# Patient Record
Sex: FEMALE | Race: WHITE | NOT HISPANIC OR LATINO | Employment: OTHER | URBAN - METROPOLITAN AREA
[De-identification: names, ages, dates, MRNs, and addresses within clinical notes are randomized per-mention and may not be internally consistent; named-entity substitution may affect disease eponyms.]

---

## 2017-03-27 DIAGNOSIS — Z12.31 ENCOUNTER FOR SCREENING MAMMOGRAM FOR MALIGNANT NEOPLASM OF BREAST: ICD-10-CM

## 2017-04-14 ENCOUNTER — HOSPITAL ENCOUNTER (OUTPATIENT)
Dept: MAMMOGRAPHY | Facility: MEDICAL CENTER | Age: 77
Discharge: HOME | End: 2017-04-14
Payer: MEDICARE

## 2017-04-14 DIAGNOSIS — Z12.31 ENCOUNTER FOR SCREENING MAMMOGRAM FOR MALIGNANT NEOPLASM OF BREAST: ICD-10-CM

## 2017-04-14 PROCEDURE — G0202 SCR MAMMO BI INCL CAD: HCPCS

## 2017-05-01 DIAGNOSIS — M85.80 OTHER SPECIFIED DISORDERS OF BONE DENSITY AND STRUCTURE, UNSPECIFIED SITE: ICD-10-CM

## 2017-05-01 DIAGNOSIS — Z12.31 ENCOUNTER FOR SCREENING MAMMOGRAM FOR MALIGNANT NEOPLASM OF BREAST: ICD-10-CM

## 2017-05-02 ENCOUNTER — ALLSCRIPTS OFFICE VISIT (OUTPATIENT)
Dept: OTHER | Facility: OTHER | Age: 77
End: 2017-05-02

## 2017-05-24 ENCOUNTER — GENERIC CONVERSION - ENCOUNTER (OUTPATIENT)
Dept: OTHER | Facility: OTHER | Age: 77
End: 2017-05-24

## 2017-06-06 ENCOUNTER — GENERIC CONVERSION - ENCOUNTER (OUTPATIENT)
Dept: OTHER | Facility: OTHER | Age: 77
End: 2017-06-06

## 2017-06-07 ENCOUNTER — ALLSCRIPTS OFFICE VISIT (OUTPATIENT)
Dept: OTHER | Facility: OTHER | Age: 77
End: 2017-06-07

## 2017-06-19 ENCOUNTER — GENERIC CONVERSION - ENCOUNTER (OUTPATIENT)
Dept: OTHER | Facility: OTHER | Age: 77
End: 2017-06-19

## 2017-07-26 ENCOUNTER — GENERIC CONVERSION - ENCOUNTER (OUTPATIENT)
Dept: OTHER | Facility: OTHER | Age: 77
End: 2017-07-26

## 2017-08-16 ENCOUNTER — GENERIC CONVERSION - ENCOUNTER (OUTPATIENT)
Dept: OTHER | Facility: OTHER | Age: 77
End: 2017-08-16

## 2017-08-23 ENCOUNTER — GENERIC CONVERSION - ENCOUNTER (OUTPATIENT)
Dept: OTHER | Facility: OTHER | Age: 77
End: 2017-08-23

## 2017-09-20 ENCOUNTER — GENERIC CONVERSION - ENCOUNTER (OUTPATIENT)
Dept: OTHER | Facility: OTHER | Age: 77
End: 2017-09-20

## 2017-09-28 ENCOUNTER — GENERIC CONVERSION - ENCOUNTER (OUTPATIENT)
Dept: OTHER | Facility: OTHER | Age: 77
End: 2017-09-28

## 2017-10-02 ENCOUNTER — GENERIC CONVERSION - ENCOUNTER (OUTPATIENT)
Dept: OTHER | Facility: OTHER | Age: 77
End: 2017-10-02

## 2017-10-11 ENCOUNTER — GENERIC CONVERSION - ENCOUNTER (OUTPATIENT)
Dept: OTHER | Facility: OTHER | Age: 77
End: 2017-10-11

## 2017-11-21 ENCOUNTER — GENERIC CONVERSION - ENCOUNTER (OUTPATIENT)
Dept: OTHER | Facility: OTHER | Age: 77
End: 2017-11-21

## 2017-11-24 ENCOUNTER — ALLSCRIPTS OFFICE VISIT (OUTPATIENT)
Dept: OTHER | Facility: OTHER | Age: 77
End: 2017-11-24

## 2017-11-25 NOTE — PROGRESS NOTES
Assessment    1  Preop examination (V72 84) (Z01 818)  2  Chronic sinusitis, unspecified location (473 9) (J32 9)  3  Benign essential hypertension (401 1) (I10)  4  Hyperlipidemia (272 4) (E78 5)  5  Hyperthyroidism (242 90) (E05 90)    Plan  Benign essential hypertension    · Renew: Lisinopril 20 MG Oral Tablet; Take 1 tablet daily    Discussion/Summary  Surgical Clearance: She is at a LOW risk from a cardiovascular standpoint at this time without any additional cardiac testing  Reevaluation needed, if she should present with symptoms prior to surgery/procedure  Patient presented for preop clearance for sinus surgery  Her chronic medical problems are stable with the exception of her blood pressure which is elevated from its usual level  Patient relates that her home readings have been generally good but higher in doctor's offices  Will increase her lisinopril from 10-20 mg and I believe she should be fine for surgery  I reviewed her preoperative testing including blood work and EKG and all are acceptable  She is medically cleared to proceed  Would recommend blood pressure check prior to surgery  Chief Complaint  Pt presents for pre op clearance, pt will be having her sinuses cleaned out 11/30/17  History of Present Illness  Pre-Op Visit (Brief): The patient is being seen for a preoperative visit-- and-- sinus surgery  Surgical Risk Assessment:  Prior Anesthesia: She had prior anesthesia,-- a prior adverse reaction to general anesthesia-- and-- nausea  Pertinent Past Medical History: copd, lipids, HTN    HPI: Patient presents for preop clearance for sinus surgery  She has a past medical history significant for hypertension, hyperlipidemia, hyperthyroidism and remote history of ovarian cancer  She has no complaints presently though has had some elevated blood pressure readings in doctor's offices   Her home readings however have been normal  She takes Zetia and simvastatin for her lipids, lisinopril 10 mg for her blood pressure  She is also taking Celebrex or aspirin and allergy medication  Review of Systems   Constitutional: No fever, no chills, feels well, no tiredness, no recent weight gain or weight loss  Eyes: No complaints of eye pain, no red eyes, no eyesight problems, no discharge, no dry eyes, no itching of eyes  ENT: no complaints of earache, no loss of hearing, no nose bleeds, no nasal discharge, no sore throat, no hoarseness  Cardiovascular: No complaints of slow heart rate, no fast heart rate, no chest pain, no palpitations, no leg claudication, no lower extremity edema  Respiratory: No complaints of shortness of breath, no wheezing, no cough, no SOB on exertion, no orthopnea, no PND  Gastrointestinal: No complaints of abdominal pain, no constipation, no nausea or vomiting, no diarrhea, no bloody stools  Genitourinary: No complaints of dysuria, no incontinence, no pelvic pain, no dysmenorrhea, no vaginal discharge or bleeding  Musculoskeletal: No complaints of arthralgias, no myalgias, no joint swelling or stiffness, no limb pain or swelling  Integumentary: No complaints of skin rash or lesions, no itching, no skin wounds, no breast pain or lump  Neurological: No complaints of headache, no confusion, no convulsions, no numbness, no dizziness or fainting, no tingling, no limb weakness, no difficulty walking  Psychiatric: Not suicidal, no sleep disturbance, no anxiety or depression, no change in personality, no emotional problems  Endocrine: No complaints of proptosis, no hot flashes, no muscle weakness, no deepening of the voice, no feelings of weakness  Hematologic/Lymphatic: No complaints of swollen glands, no swollen glands in the neck, does not bleed easily, does not bruise easily  Active Problems  1  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)  2  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)  3  Anxiety (300 00) (F41 9)  4  Asthma (493 90) (J45 909)  5   Benign essential hypertension (401  1) (I10)  6  Dysphagia, unspecified type (787 20) (R13 10)  7  Encounter for gynecological examination without abnormal finding (V72 31) (Z01 419)  8  Flu vaccine need (V04 81) (Z23)  9  Gastroesophageal reflux disease with esophagitis (530 11) (K21 0)  10  History of allergy (V15 09) (Z88 9)  11  History of malignant neoplasm of ovary (V10 43) (Z85 43)  12  Hyperlipidemia (272 4) (E78 5)  13  Hyperthyroidism (242 90) (E05 90)  14  Lung nodule (793 11) (R91 1)  15  Osteoarthritis (715 90) (M19 90)  16  Osteopenia (733 90) (M85 80)  17  Pancreatic mass (577 9) (K86 9)  18  Simple goiter (240 0) (E04 0)    Past Medical History   · Acute upper respiratory infection (465 9) (J06 9)   · History of Benign essential hypertension (401 1) (I10)   · History of Depression screen (V79 0) (Z13 89)   · History of Flu vaccine need (V04 81) (Z23)   · History of acute sinusitis (V12 69) (Z87 09)   · History of allergic rhinitis (V12 69) (Z87 09)   · History of allergy (V15 09) (Z88 9)   · History of influenza (V12 09) (Z87 09)   · History of influenza vaccination (V49 89) (Z92 29)   · History of pneumococcal vaccination (V49 89) (Z92 29)   · History of screening mammography (V15 89) (Z92 89)   · History of screening mammography (V15 89) (Z92 89)   · History of tachycardia (V13 89) (W91 940)   · History of Influenza vaccine needed (V04 81) (Z23)   · History of Screening for depression (V79 0) (Z13 89)   · History of Screening for genitourinary condition (V81 6) (Z13 89)   · History of Screening for neurological condition (V80 09) (Z13 89)   · History of Screening for other and unspecified genitourinary condition (V81 6) (Z13 89)   · History of Shoulder Pain Elicited By Motion   · History of Special screening for other neurological conditions (V80 09) (Z13 89)    The active problems and past medical history were reviewed and updated today        Surgical History   · History of Hysterectomy    Family History  Mother    · Family history of malignant neoplasm of breast (V16 3) (Z80 3)  Father    · Family history of myocardial infarction (V17 3) (Z82 49)    Social History     · Never A Smoker    Current Meds  1  Azelastine HCl - 0 15 % Nasal Solution; Spray once in the morning and once at night; Therapy: 69CXD3786 to Recorded  2  CeleBREX 200 MG Oral Capsule; TAKE 1 CAPSULE ONCE DAILY; Therapy: 25VZH6860 to (Evaluate:70Yvp9101); Last Rx:07Jun2017 Ordered  3  Celecoxib 200 MG Oral Capsule; TAKE ONE CAPSULE BY MOUTH ONCE DAILY; Therapy: 19LOQ2414 to (Evaluate:22Apr2018)  Requested for: 02YDY5636; Last FF:99DWS3685 Ordered  4  [employer] Fish Oil CAPS; TAKE 1 CAPSULE Daily Recorded  5  [facility] Vit D 5000 High-Potency 5000 UNIT CAPS; Therapy: (Recorded:44Dfb0101) to Recorded  6  Dulera 100-5 MCG/ACT Inhalation Aerosol; INHALE 2 PUFFS,  BY MOUTH, TWICE DAILY; Therapy: (Lux Hicks) to Recorded  7  Ezetimibe 10 MG Oral Tablet; take one tablet by mouth daily; Therapy: 56VNV0572 to (Evaluate:01Drr0975)  Requested for: 72FBV1033; Last Rx:01Upi8435 Ordered  8  Glucosamine Chondr Complex 500-400 MG Oral Capsule; Therapy: (Recorded:04Jun2014) to Recorded  9  Ipratropium Bromide 0 02 % Inhalation Solution; USE 1 UNIT DOSE VIA NEBULIZER  4 TIMES A DAY AS NEEDED; Therapy: 94DVT7273 to Recorded  10  Lisinopril 10 MG Oral Tablet; take 1 tablet by mouth daily; Therapy: 58PTW6072 to (Evaluate:63Sfa4946)  Requested for: 34CKX0260; Last  Rx:07Jun2017 Ordered  11  Nasacort AQ 55 MCG/ACT AERO; bid; Therapy: 59XHN7358 to Recorded  12  Pantoprazole Sodium 40 MG Oral Tablet Delayed Release; TAKE 1 TABLET DAILY   Requested for: 34BDO0884; Last Rx:28Nix8425 Ordered  13  Simvastatin 20 MG Oral Tablet; Take 1 tablet daily; Therapy: 14LVK3435 to (Reta Maldonado)  Requested for: 77Hhi7624; Last  Rx:17Rym6975 Ordered    The medication list was reviewed and updated today  Allergies  1   No Known Drug Allergies    Vitals   Recorded: 23JFL4837 11:19AM Recorded: 40ZKV1596 10: 42AM   Temperature  98 F, Tympanic   Heart Rate  92   Respiration Quality  Normal   Respiration  17   Systolic 055 682, LUE, Sitting   Diastolic 92 001, LUE, Sitting   Weight  163 lb 4 oz   BMI Calculated  26 35   BSA Calculated  1 83   O2 Saturation  97, RA   Pain Scale  0       Physical Exam   Constitutional  General appearance: No acute distress, well appearing and well nourished  Head and Face  Head and face: Normal    Eyes  Conjunctiva and lids: No swelling, erythema or discharge  Pupils and irises: Equal, round, reactive to light  Ears, Nose, Mouth, and Throat  Hearing: Normal    Oropharynx: Normal with no erythema, edema, exudate or lesions  Neck  Neck: Supple, symmetric, trachea midline, no masses  Thyroid: Normal, no thyromegaly  Pulmonary  Respiratory effort: No increased work of breathing or signs of respiratory distress  Auscultation of lungs: Clear to auscultation  Cardiovascular  Auscultation of heart: Normal rate and rhythm, normal S1 and S2, no murmurs  Examination of extremities for edema and/or varicosities: Normal    Lymphatic  Palpation of lymph nodes in neck: No lymphadenopathy  Musculoskeletal  Gait and station: Normal    Neurologic  Cranial nerves: Cranial nerves II-XII intact  Psychiatric  Judgment and insight: Normal    Orientation to person, place, and time: Normal    Recent and remote memory: Intact  Mood and affect: Normal        Results/Data  No acute ischemia1   Rhythm and rate:1  normal sinus rhythm1   P-waves:1  the P wave is normal1   QRS:1  the QRS is normal1   ST segment:1  the ST segments are normal1   Comparison to prior ECGs:1   No prior ECGs were available for comparison1         1 Amended By: Chava Baker; Nov 24 2017 12:32 PM EST    End of Encounter Meds    1  Lisinopril 20 MG Oral Tablet; Take 1 tablet daily; Therapy: 24Apr2013 to (Last Rx:24Nov2017)  Requested for: 79NRM2316 Ordered    2   Pantoprazole Sodium 40 MG Oral Tablet Delayed Release; TAKE 1 TABLET DAILY  Requested for: 25HEZ8728; Last Rx:11Oct2017 Ordered    3  Ezetimibe 10 MG Oral Tablet (Zetia); take one tablet by mouth daily; Therapy: 80WNA4052 to (Evaluate:52Ums0482)  Requested for: 48DTN6554; Last Rx:05Nov2017 Ordered  4  Simvastatin 20 MG Oral Tablet; Take 1 tablet daily; Therapy: 09HCI1444 to (Para Allis)  Requested for: 27Nam0043; Last Rx:28Hvl8807 Ordered    5  CeleBREX 200 MG Oral Capsule (Celecoxib); TAKE 1 CAPSULE ONCE DAILY; Therapy: 03ZWM4860 to (Evaluate:78Szn5896); Last Rx:07Jun2017 Ordered  6  Celecoxib 200 MG Oral Capsule (CeleBREX); TAKE ONE CAPSULE BY MOUTH ONCE DAILY; Therapy: 38TEK6118 to (Evaluate:22Apr2018)  Requested for: 27DHG5451; Last AL:30GRJ7287 Ordered    7  Nasacort AQ 55 MCG/ACT AERO; bid; Therapy: 11LIT1860 to Recorded    8  Dulera 100-5 MCG/ACT Inhalation Aerosol; INHALE 2 PUFFS,  BY MOUTH, TWICE DAILY; Therapy: (Loki William) to Recorded    9  Azelastine HCl - 0 15 % Nasal Solution; Spray once in the morning and once at night; Therapy: 54JKY0026 to Recorded  10  [employer] Fish Oil CAPS; TAKE 1 CAPSULE Daily Recorded  11  [employer]S Vit D 5000 High-Potency 5000 UNIT CAPS; Therapy: (Recorded:00Wms9812) to Recorded  12  Glucosamine Chondr Complex 500-400 MG Oral Capsule; Therapy: (Recorded:04Jun2014) to Recorded  13  Ipratropium Bromide 0 02 % Inhalation Solution; USE 1 UNIT DOSE VIA NEBULIZER  4  TIMES A DAY AS NEEDED; Therapy: 72VXF7802 to Recorded    Future Appointments    Date/Time Provider Specialty Site   05/03/2018 01:15 PM MARIA LUISA Rowe   Obstetrics/Gynecology OB GYN C[facility]   12/13/2017 01:00 PM Timothy Spears DO Family Medicine [address]   Electronically signed by : Bebe Teixeira DO; Nov 24 2017 12:34PM EST                       (Author)

## 2017-12-12 ENCOUNTER — GENERIC CONVERSION - ENCOUNTER (OUTPATIENT)
Dept: FAMILY MEDICINE CLINIC | Facility: CLINIC | Age: 77
End: 2017-12-12

## 2017-12-13 ENCOUNTER — ALLSCRIPTS OFFICE VISIT (OUTPATIENT)
Dept: OTHER | Facility: OTHER | Age: 77
End: 2017-12-13

## 2017-12-20 ENCOUNTER — GENERIC CONVERSION - ENCOUNTER (OUTPATIENT)
Dept: FAMILY MEDICINE CLINIC | Facility: CLINIC | Age: 77
End: 2017-12-20

## 2017-12-22 ENCOUNTER — GENERIC CONVERSION - ENCOUNTER (OUTPATIENT)
Dept: FAMILY MEDICINE CLINIC | Facility: CLINIC | Age: 77
End: 2017-12-22

## 2018-01-10 NOTE — MISCELLANEOUS
Message  Spoke to Dr Eliza Pérez, allergist  He called regarding patient's emergency room visit and CAT scan from last month  CAT scan revealed enlarged thyroid as well as thickening of distal esophagus and questionable pancreatic mass  Thyroid issue was addressed by surgical oncology  Dr Eliza Pérez is referring the patient to GI for EGD to evaluate the distal esophagus and I will order an MRI of the abdomen to evaluate the pancreatic mass  Plan  Pancreatic mass    · * MRI ABDOMEN W WO CONTRAST; Status:Need Information - Financial Authorization;   Requested for:94Pcb4480;     Signatures   Electronically signed by : Maninder Vences DO; Jul 1 2016 11:19AM EST                       (Author)

## 2018-01-10 NOTE — CONSULTS
Chief Complaint  Pt presents for pre op clearance, pt will be having her sinuses cleaned out 11/30/17  History of Present Illness  Pre-Op Visit (Brief): The patient is being seen for a preoperative visit and sinus surgery  Surgical Risk Assessment:   Prior Anesthesia: She had prior anesthesia, a prior adverse reaction to general anesthesia and nausea  Pertinent Past Medical History: copd, lipids, HTN    HPI: Patient presents for preop clearance for sinus surgery  She has a past medical history significant for hypertension, hyperlipidemia, hyperthyroidism and remote history of ovarian cancer  She has no complaints presently though has had some elevated blood pressure readings in doctor's offices  Her home readings however have been normal  She takes Zetia and simvastatin for her lipids, lisinopril 10 mg for her blood pressure  She is also taking Celebrex or aspirin and allergy medication  Review of Systems    Constitutional: No fever, no chills, feels well, no tiredness, no recent weight gain or weight loss  Eyes: No complaints of eye pain, no red eyes, no eyesight problems, no discharge, no dry eyes, no itching of eyes  ENT: no complaints of earache, no loss of hearing, no nose bleeds, no nasal discharge, no sore throat, no hoarseness  Cardiovascular: No complaints of slow heart rate, no fast heart rate, no chest pain, no palpitations, no leg claudication, no lower extremity edema  Respiratory: No complaints of shortness of breath, no wheezing, no cough, no SOB on exertion, no orthopnea, no PND  Gastrointestinal: No complaints of abdominal pain, no constipation, no nausea or vomiting, no diarrhea, no bloody stools  Genitourinary: No complaints of dysuria, no incontinence, no pelvic pain, no dysmenorrhea, no vaginal discharge or bleeding  Musculoskeletal: No complaints of arthralgias, no myalgias, no joint swelling or stiffness, no limb pain or swelling     Integumentary: No complaints of skin rash or lesions, no itching, no skin wounds, no breast pain or lump  Neurological: No complaints of headache, no confusion, no convulsions, no numbness, no dizziness or fainting, no tingling, no limb weakness, no difficulty walking  Psychiatric: Not suicidal, no sleep disturbance, no anxiety or depression, no change in personality, no emotional problems  Endocrine: No complaints of proptosis, no hot flashes, no muscle weakness, no deepening of the voice, no feelings of weakness  Hematologic/Lymphatic: No complaints of swollen glands, no swollen glands in the neck, does not bleed easily, does not bruise easily  Active Problems    1  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)   2  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)   3  Anxiety (300 00) (F41 9)   4  Asthma (493 90) (J45 909)   5  Benign essential hypertension (401 1) (I10)   6  Dysphagia, unspecified type (787 20) (R13 10)   7  Encounter for gynecological examination without abnormal finding (V72 31) (Z01 419)   8  Flu vaccine need (V04 81) (Z23)   9  Gastroesophageal reflux disease with esophagitis (530 11) (K21 0)   10  History of allergy (V15 09) (Z88 9)   11  History of malignant neoplasm of ovary (V10 43) (Z85 43)   12  Hyperlipidemia (272 4) (E78 5)   13  Hyperthyroidism (242 90) (E05 90)   14  Lung nodule (793 11) (R91 1)   15  Osteoarthritis (715 90) (M19 90)   16  Osteopenia (733 90) (M85 80)   17  Pancreatic mass (577 9) (K86 9)   18   Simple goiter (240 0) (E04 0)    Past Medical History    · Acute upper respiratory infection (465 9) (J06 9)   · History of Benign essential hypertension (401 1) (I10)   · History of Depression screen (V79 0) (Z13 89)   · History of Flu vaccine need (V04 81) (Z23)   · History of acute sinusitis (V12 69) (Z87 09)   · History of allergic rhinitis (V12 69) (Z87 09)   · History of allergy (V15 09) (Z88 9)   · History of influenza (V12 09) (Z87 09)   · History of influenza vaccination (V49 89) (Z92 29)   · History of pneumococcal vaccination (V49 89) (Z92 29)   · History of screening mammography (V15 89) (Z92 89)   · History of screening mammography (V15 89) (Z92 89)   · History of tachycardia (V13 89) (G44 942)   · History of Influenza vaccine needed (V04 81) (Z23)   · History of Screening for depression (V79 0) (Z13 89)   · History of Screening for genitourinary condition (V81 6) (Z13 89)   · History of Screening for neurological condition (V80 09) (Z13 89)   · History of Screening for other and unspecified genitourinary condition (V81 6) (Z13 89)   · History of Shoulder Pain Elicited By Motion   · History of Special screening for other neurological conditions (V80 09) (Z13 89)    The active problems and past medical history were reviewed and updated today  Surgical History    · History of Hysterectomy    Family History    · Family history of malignant neoplasm of breast (V16 3) (Z80 3)    · Family history of myocardial infarction (V17 3) (Z82 49)    Social History    · Never A Smoker    Current Meds   1  Azelastine HCl - 0 15 % Nasal Solution; Spray once in the morning and once at night; Therapy: 76CNI4034 to Recorded   2  CeleBREX 200 MG Oral Capsule; TAKE 1 CAPSULE ONCE DAILY; Therapy: 03DBI3492 to (Evaluate:25Ybd5019); Last Rx:07Jun2017 Ordered   3  Celecoxib 200 MG Oral Capsule; TAKE ONE CAPSULE BY MOUTH ONCE DAILY; Therapy: 15GCL2874 to (Evaluate:22Apr2018)  Requested for: 07HNT8788; Last   YX:58XSN0766 Ordered   4  [employer]S Fish Oil CAPS; TAKE 1 CAPSULE Daily Recorded   5  [employer]S Vit D 5000 High-Potency 5000 UNIT CAPS; Therapy: (Recorded:67Rwn9162) to Recorded   6  Dulera 100-5 MCG/ACT Inhalation Aerosol; INHALE 2 PUFFS,  BY MOUTH, TWICE DAILY; Therapy: (Beverlie Olszewski) to Recorded   7  Ezetimibe 10 MG Oral Tablet; take one tablet by mouth daily; Therapy: 02UJS8891 to (Evaluate:76Hby2324)  Requested for: 79NYP4958; Last   Rx:05Nov2017 Ordered   8   Glucosamine Chondr Complex 500-400 MG Oral Capsule; Therapy: (Recorded:04Jun2014) to Recorded   9  Ipratropium Bromide 0 02 % Inhalation Solution; USE 1 UNIT DOSE VIA NEBULIZER  4   TIMES A DAY AS NEEDED; Therapy: 09WFM1233 to Recorded   10  Lisinopril 10 MG Oral Tablet; take 1 tablet by mouth daily; Therapy: 73GGV6956 to (Evaluate:36Ngv6672)  Requested for: 63TWE1553; Last    Rx:53Nla3362 Ordered   11  Nasacort AQ 55 MCG/ACT AERO; bid; Therapy: 80LYP0028 to Recorded   12  Pantoprazole Sodium 40 MG Oral Tablet Delayed Release; TAKE 1 TABLET DAILY     Requested for: 60JFX7023; Last Rx:73Kax6670 Ordered   13  Simvastatin 20 MG Oral Tablet; Take 1 tablet daily; Therapy: 72CHP8635 to (Valerie Yeh)  Requested for: 50Mak4938; Last    Rx:69Znl1638 Ordered    The medication list was reviewed and updated today  Allergies    1  No Known Drug Allergies    Vitals  Signs    Systolic: 362  Diastolic: 92   Temperature: 98 F, Tympanic  Heart Rate: 92  Respiration Quality: Normal  Respiration: 17  Systolic: 309, LUE, Sitting  Diastolic: 672, LUE, Sitting  Weight: 163 lb 4 oz  BMI Calculated: 26 35  BSA Calculated: 1 83  O2 Saturation: 97, RA  Pain Scale: 0    Physical Exam    Constitutional   General appearance: No acute distress, well appearing and well nourished  Head and Face   Head and face: Normal     Eyes   Conjunctiva and lids: No swelling, erythema or discharge  Pupils and irises: Equal, round, reactive to light  Ears, Nose, Mouth, and Throat   Hearing: Normal     Oropharynx: Normal with no erythema, edema, exudate or lesions  Neck   Neck: Supple, symmetric, trachea midline, no masses  Thyroid: Normal, no thyromegaly  Pulmonary   Respiratory effort: No increased work of breathing or signs of respiratory distress  Auscultation of lungs: Clear to auscultation  Cardiovascular   Auscultation of heart: Normal rate and rhythm, normal S1 and S2, no murmurs      Examination of extremities for edema and/or varicosities: Normal  Lymphatic   Palpation of lymph nodes in neck: No lymphadenopathy  Musculoskeletal   Gait and station: Normal     Neurologic   Cranial nerves: Cranial nerves II-XII intact  Psychiatric   Judgment and insight: Normal     Orientation to person, place, and time: Normal     Recent and remote memory: Intact  Mood and affect: Normal        Results/Data  No acute ischemia  Rhythm and rate: normal sinus rhythm  P-waves: the P wave is normal    QRS: the QRS is normal    ST segment: the ST segments are normal    Comparison to prior ECGs:  no prior ECGs were available for comparison  Assessment    1  Preop examination (V72 84) (Z01 818)   2  Chronic sinusitis, unspecified location (473 9) (J32 9)   3  Benign essential hypertension (401 1) (I10)   4  Hyperlipidemia (272 4) (E78 5)   5  Hyperthyroidism (242 90) (E05 90)    Plan  Benign essential hypertension    · Lisinopril 20 MG Oral Tablet; Take 1 tablet daily    Discussion/Summary  Surgical Clearance: She is at a LOW risk from a cardiovascular standpoint at this time without any additional cardiac testing  Reevaluation needed, if she should present with symptoms prior to surgery/procedure  Patient presented for preop clearance for sinus surgery  Her chronic medical problems are stable with the exception of her blood pressure which is elevated from its usual level  Patient relates that her home readings have been generally good but higher in doctor's offices  Will increase her lisinopril from 10-20 mg and I believe she should be fine for surgery  I reviewed her preoperative testing including blood work and EKG and all are acceptable  She is medically cleared to proceed  Would recommend blood pressure check prior to surgery  End of Encounter Meds    1  Lisinopril 20 MG Oral Tablet; Take 1 tablet daily; Therapy: 24Apr2013 to (Last Rx:24Nov2017)  Requested for: 35RXE5638 Ordered    2   Pantoprazole Sodium 40 MG Oral Tablet Delayed Release; TAKE 1 TABLET DAILY    Requested for: 36KOG1284; Last Rx:11Oct2017 Ordered    3  Ezetimibe 10 MG Oral Tablet (Zetia); take one tablet by mouth daily; Therapy: 98ERK6566 to (Evaluate:47Oqu4667)  Requested for: 18FQX7371; Last   Rx:05Nov2017 Ordered   4  Simvastatin 20 MG Oral Tablet; Take 1 tablet daily; Therapy: 03CWX5125 to ([facility])  Requested for: 59Xdr3125; Last   Rx:58Lpd1380 Ordered    5  CeleBREX 200 MG Oral Capsule (Celecoxib); TAKE 1 CAPSULE ONCE DAILY; Therapy: 39OOE9214 to (Evaluate:18Hqb4565); Last Rx:07Jun2017 Ordered   6  Celecoxib 200 MG Oral Capsule (CeleBREX); TAKE ONE CAPSULE BY MOUTH ONCE   DAILY; Therapy: 45LXN7275 to (Evaluate:22Apr2018)  Requested for: 77FNU6874; Last   NR:78GFK8364 Ordered    7  Nasacort AQ 55 MCG/ACT AERO; bid; Therapy: 63KXS4694 to Recorded    8  Dulera 100-5 MCG/ACT Inhalation Aerosol; INHALE 2 PUFFS,  BY MOUTH, TWICE DAILY; Therapy: (Juan J Salazar) to Recorded    9  Azelastine HCl - 0 15 % Nasal Solution; Spray once in the morning and once at night; Therapy: 10QWC8169 to Recorded   10  [employer] Fish Oil CAPS; TAKE 1 CAPSULE Daily Recorded   11  [employer]S Vit D 5000 High-Potency 5000 UNIT CAPS; Therapy: (Recorded:56Cxg2522) to Recorded   12  Glucosamine Chondr Complex 500-400 MG Oral Capsule; Therapy: (Recorded:04Jun2014) to Recorded   13  Ipratropium Bromide 0 02 % Inhalation Solution; USE 1 UNIT DOSE VIA NEBULIZER  4    TIMES A DAY AS NEEDED;     Therapy: 15FSA5925 to Recorded    Signatures   Electronically signed by : Leonardo Cook DO; Nov 24 2017 12:34PM EST                       (Author)

## 2018-01-13 VITALS
BODY MASS INDEX: 26.35 KG/M2 | HEART RATE: 92 BPM | WEIGHT: 163.25 LBS | SYSTOLIC BLOOD PRESSURE: 168 MMHG | OXYGEN SATURATION: 97 % | RESPIRATION RATE: 17 BRPM | DIASTOLIC BLOOD PRESSURE: 92 MMHG | TEMPERATURE: 98 F

## 2018-01-14 VITALS
BODY MASS INDEX: 26.52 KG/M2 | SYSTOLIC BLOOD PRESSURE: 122 MMHG | WEIGHT: 165 LBS | DIASTOLIC BLOOD PRESSURE: 62 MMHG | HEIGHT: 66 IN

## 2018-01-14 VITALS
DIASTOLIC BLOOD PRESSURE: 80 MMHG | WEIGHT: 166.31 LBS | TEMPERATURE: 97.6 F | SYSTOLIC BLOOD PRESSURE: 110 MMHG | HEART RATE: 84 BPM | BODY MASS INDEX: 26.73 KG/M2 | HEIGHT: 66 IN | OXYGEN SATURATION: 99 % | RESPIRATION RATE: 16 BRPM

## 2018-01-18 NOTE — MISCELLANEOUS
Message  Spoke to the patient on Sunday afternoon  Patient was seen in the emergency room over the weekend  Was seen primarily for an increase in shortness of breath and dysphagia  Records are not available to me at this time but as per the patient workup in the emergency room included CAT scan of the chest neck and head  Study revealed small pulmonary nodule and a large thyroid nodule  The thyroid nodule is a known entity and in fact had been biopsied 6 months ago and found to be negative  It is unclear whether this nodule is now larger than under previous studies  Patient was discharged with instructions to follow-up with endocrinology to arrange for follow-up on the nodule these of the biopsy  It is believed that this may be possible source of her symptoms  Additionally the patient had already seen gastroenterology last week for the complaint of dysphagia and is scheduled tentatively for the end of this week in 4 days for an EGD  I explained to the patient that based on her account of her ER visit, it is likely that the reason for her dysphasia and neck fullness is from an enlarging thyroid nodule and a primary concern at this time is for her to follow-up with endocrinology to arrange more definitive treatment which may in fact include surgery  The EGD may need to be postponed and if it turns out as I believe that the thyroid nodule is responsible for her symptoms it is more than likely that addressing that concern we will likely eliminate the need for the GI workup  Also the patient was given pantoprazole in the emergency room and has found in the last 24 hours that is significantly reduced the discomfort she had in her throat and chest  Will remain in close contact with the patient regarding the endocrinology evaluation  She has an established endocrinologist and will be calling their office first thing tomorrow morning to arrange follow-up        Signatures   Electronically signed by : Pooja Blake DO; May  8 2016 12:35PM EST                       (Author)

## 2018-01-22 VITALS — TEMPERATURE: 95.7 F

## 2018-01-23 VITALS
TEMPERATURE: 97.8 F | HEIGHT: 65 IN | BODY MASS INDEX: 26.35 KG/M2 | DIASTOLIC BLOOD PRESSURE: 74 MMHG | RESPIRATION RATE: 16 BRPM | SYSTOLIC BLOOD PRESSURE: 110 MMHG | HEART RATE: 67 BPM | WEIGHT: 158.19 LBS | OXYGEN SATURATION: 99 %

## 2018-01-23 NOTE — PROGRESS NOTES
Assessment    1  Encounter for preventive health examination (V70 0) (Z00 00)    Plan  Benign essential hypertension    · Lisinopril 10 MG Oral Tablet; Take 1 tablet daily  Benign essential hypertension, History of malignant neoplasm of ovary, Hyperlipidemia,  Hyperthyroidism    · (Q) CBC (INCLUDES DIFF/PLT) (REFL); Status:Active; Requested VHB:79PCC7140;    · (Q) COMPREHENSIVE METABOLIC PANEL W/O ALT; Status:Active; Requested  YBU:75AHK0475;    · (Q) LIPID PANEL WITH DIRECT LDL; Status:Active; Requested VND:84VZP0147;    · (Q) TSH, 3RD GENERATION; Status:Active; Requested PTI:72GXZ9552;   Hyperlipidemia    · Ezetimibe 10 MG Oral Tablet (Zetia); take one tablet by mouth daily  Screening for depression    · *VB-Depression Screening; Status:Complete - Retrospective By Protocol Authorization;    Done: 02ANQ9944 01:16PM  Screening for other and unspecified genitourinary condition    · *VB - Urinary Incontinence Screen (Dx Z13 89 Screen for UI); Status:Complete -  Retrospective By Protocol Authorization;   Done: 48USJ2161 01:16PM  Special screening for other neurological conditions    · *VB - Fall Risk Assessment  (Dx Z13 89 Screen for Neurologic Disorder);  Status:Complete - Retrospective By Protocol Authorization;   Done: 03YYY7685 01:15PM    Discussion/Summary    Patient was seen for annual wellness visit for Medicare  I reviewed her questionnaire with her and her no outstanding issues or new concerns from last years form  She is up-to-date with mammogram    She's up-to-date with immunizations but due for DEXA scan  Impression: Subsequent Annual Wellness Visit  Cardiovascular screening and counseling: screening is current  Diabetes screening and counseling: screening is current  Colorectal cancer screening and counseling: screening is current  Breast cancer screening and counseling: screening is current  Cervical cancer screening and counseling: screening not indicated     Osteoporosis screening and counseling: due for DXA axial skeleton  Abdominal aortic aneurysm screening and counseling: screening not indicated  Glaucoma screening and counseling: screening is current  HIV screening and counseling: screening not indicated  Immunizations: influenza vaccine is up to date this year, the lifetime pneumococcal vaccine has been completed, hepatitis B vaccination series is not indicated at this time due to the patient's low risk of tita the disease, Zostavax vaccination up to date, Td vaccination status is unknown and Tdap vaccination status is unknown  Advance Directive Planning: complete and up to date, she was encouraged to follow-up with me to discuss her questions and/or decisions  Patient Discussion: plan discussed with the patient, follow-up visit needed in 6 months  Chief Complaint  Pt presents for AWV  History of Present Illness  HPI: Patient presents for annual wellness visit for Medicare  She completed her questionnaire  Overall she is doing well though she has had an eventful several weeks recently  She had sinus surgery which was fairly uneventful but shortly after that had an acute episode of shortness of breath which turned out to be a non ST elevation MI  She underwent cardiac stenting as result of that  At this point she is doing well and has no new complaints  Medication was adjusted  She answered the right rest of her questions on the annual wellness visit and there are no significant changes as from last year  Welcome to [employer] and Wellness Visits: The patient is being seen for the subsequent annual wellness visit  Medicare Screening and Risk Factors   Hospitalizations: no previous hospitalizations, she has been hospitalized 1 times and SINUS AND STENT  Once per lifetime medicare screening tests: ECG has not been done and AAA screening US has not yet been done  Medicare Screening Tests Risk Questions   Abdominal aortic aneurysm risk assessment: none indicated  Osteoporosis risk assessment: , female gender and over 48years of age, but none indicated  HIV risk assessment: none indicated  Drug and Alcohol Use: The patient has never smoked cigarettes  She has declined tobacco cessation intervention  The patient reports occasional alcohol use  Alcohol concern:   The patient has no concerns about alcohol abuse  She has declined alcohol treatment  She has never used illicit drugs  She has declined drug treatment  Diet and Physical Activity: Current diet includes well balanced meals, low fat food choices, 1 servings of fruit per day, 1 servings of vegetables per day, 1 servings of meat per day, 1 servings of whole grains per day, 2 servings of dairy products per day, 2 cups of tea per day, 0 cans of regular soda per day, 0 cans of diet soda per day and 3-WATER  She exercises infrequently and SUMMER TIME  The patient does not exercise  Exercise: walking, golf  Mood Disorder and Cognitive Impairment Screening: PHQ-9 Depression Scale   Over the past 2 weeks, how often have you been bothered by the following problems? 1 ) Little interest or pleasure in doing things? Not at all    2 ) Feeling down, depressed or hopeless? Not at all    3 ) Trouble falling asleep or sleeping too much? Not at all    4 ) Feeling tired or having little energy? Not at all    5 ) Poor appetite or overeating? Not at all    6 ) Feeling bad about yourself, or that you are a failure, or have let yourself or your family down? Not at all    7 ) Trouble concentrating on things, such as reading a newspaper or watching television? Not at all    8 ) Moving or speaking so slowly that other people could have noticed, or the opposite, moving or speaking faster than usual? Not at all    9 ) Thoughts that you would be off dead or of hurting yourself in some way? Not at all  TOTAL SCORE: 0    Depression screening  negative for symptoms   She denies feeling down, depressed, or hopeless over the past two weeks  She denies feeling little interest or pleasure in doing things over the past two weeks  Cognitive impairment screening: denies difficulty learning/retaining new information, denies difficulty handling complex tasks, denies difficulty with reasoning, denies difficulty with spatial ability and orientation, denies difficulty with language and denies difficulty with behavior  Functional Ability/Level of Safety: Hearing is normal bilaterally, normal in the right ear and slightly decreased in the left ear  She denies hearing difficulties  She does not use a hearing aid  The patient is currently able to do activities of daily living without limitations, able to do instrumental activities of daily living without limitations, able to participate in social activities without limitations and able to drive without limitations  Activities of daily living details: does not need help using the phone, no transportation help needed, does not need help shopping, no meal preparation help needed, does not need help doing housework, does not need help doing laundry, does not need help managing medications and does not need help managing money  Fall risk factors: The patient fell 0 times in the past 12 months  Injury History: no polypharmacy, no alcohol use, no mobility impairment, no antidepressant use, no deconditioning, no postural hypotension, no sedative use, no visual impairment, no antihypertensive use, no cognitive impairment, up and go test was normal and no previous fall  Home safety risk factors:  no grab bars in the bathroom and no handrails on the stairs, but no loose rugs, no poor household lighting and no uneven floors  Advance Directives: Advance directives: living will and durable power of  for health care directives     Co-Managers and Medical Equipment/Suppliers: See Patient Care Team   Reviewed Updated Jay Denise:   Last Medicare Wellness Visit Information was reviewed, patient interviewed, no change since last AWV  Preventive Quality Program 65 and Older: Falls Risk: The patient fell 0 times in the past 12 months  The patient is currently asymptomatic Symptoms Include: The patient currently has no urinary incontinence symptoms  Patient Care Team    Care Team Member Role Specialty Office Number   Cliff Samano DO  Allergy/Immunology [phone]   Analy Hernandez MD  Obstetrics/Gynecology [phone]     Review of Systems    Constitutional: negative  Eyes: negative  ENT: negative  Cardiovascular: negative  Respiratory: shortness of breath, but negative  Gastrointestinal: Dysphasia, but negative  Genitourinary: negative  Musculoskeletal: negative  Integumentary and Breasts: negative  Neurological: negative  Psychiatric: negative  Endocrine: negative  Hematologic and Lymphatic: negative  Over the past 2 weeks, how often have you been bothered by the following problems? 1 ) Little interest or pleasure in doing things? Not at all    2 ) Feeling down, depressed or hopeless? Not at all    3 ) Trouble falling asleep or sleeping too much? Not at all    4 ) Feeling tired or having little energy? Not at all    5 ) Poor appetite or overeating? Not at all    6 ) Feeling bad about yourself, or that you are a failure, or have let yourself or your family down? Not at all    7 ) Trouble concentrating on things, such as reading a newspaper or watching television? Not at all    8 ) Moving or speaking so slowly that other people could have noticed, or the opposite, moving or speaking faster than usual? Not at all  How difficult have these problems made it for you to do your work, take care of things at home, or get along with people? 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)   3  Anxiety (300 00) (F41 9)   4  Asthma (493 90) (J45 909)   5  Benign essential hypertension (401 1) (I10)   6  Chronic sinusitis, unspecified location (473 9) (J32 9)   7  Dysphagia, unspecified type (787 20) (R13 10)   8  Encounter for gynecological examination without abnormal finding (V72 31) (Z01 419)   9  Gastroesophageal reflux disease with esophagitis (530 11) (K21 0)   10  History of allergy (V15 09) (Z88 9)   11  History of malignant neoplasm of ovary (V10 43) (Z85 43)   12  Hyperlipidemia (272 4) (E78 5)   13  Hyperthyroidism (242 90) (E05 90)   14  Lung nodule (793 11) (R91 1)   15  Osteoarthritis (715 90) (M19 90)   16  Osteopenia (733 90) (M85 80)   17  Pancreatic mass (577 9) (K86 9)   18  Preop examination (V72 84) (Z01 818)   19  Screening for depression (V79 0) (Z13 89)   20  Screening for other and unspecified genitourinary condition (V81 6) (Z13 89)   21  Simple goiter (240 0) (E04 0)   22   Special screening for other neurological conditions (V80 09) (Z13 89)    Past Medical History    · Acute upper respiratory infection (465 9) (J06 9)   · History of Benign essential hypertension (401 1) (I10)   · History of Depression screen (V79 0) (Z13 89)   · History of Flu vaccine need (V04 81) (Z23)   · History of acute sinusitis (V12 69) (Z87 09)   · History of allergic rhinitis (V12 69) (Z87 09)   · History of allergy (V15 09) (Z88 9)   · History of influenza (V12 09) (Z87 09)   · History of influenza vaccination (V49 89) (Z92 29)   · History of pneumococcal vaccination (V49 89) (Z92 29)   · History of screening mammography (V15 89) (Z92 89)   · History of screening mammography (V15 89) (Z92 89)   · History of tachycardia (V13 89) (S63 129)   · History of Influenza vaccine needed (V04 81) (Z23)   · History of Screening for depression (V79 0) (Z13 89)   · History of Screening for genitourinary condition (V81 6) (Z13 89)   · History of Screening for neurological condition (V80 09) (Z13 89)   · History of Screening for other and unspecified genitourinary condition (V81 6) (Z13 89)   · History of Shoulder Pain Elicited By Motion   · History of Special screening for other neurological conditions (V80 09) (Z13 89)    The active problems and past medical history were reviewed and updated today  Surgical History    · History of Hysterectomy    Family History  Mother    · Family history of malignant neoplasm of breast (V16 3) (Z80 3)  Father    · Family history of myocardial infarction (V17 3) (Z82 49)    Social History    · Never A Smoker  The social history was reviewed and updated today  Current Meds   1  Adult Aspirin EC Low Strength 81 MG Oral Tablet Delayed Release; TAKE 1 TABLET   DAILY AS DIRECTED; Therapy: 07LEQ9147 to Recorded   2  ALPRAZolam 0 25 MG Oral Tablet; TAKE 1 TABLET Twice daily PRN; Therapy: 54MQW9416 to (Evaluate:23Yzh2745) Recorded   3  Atorvastatin Calcium 40 MG Oral Tablet; TAKE 1 TABLET BY MOUTH EVERY DAY; Therapy: 42KLW9575 to Recorded   4  Clopidogrel Bisulfate 75 MG Oral Tablet; TAKE ONE TABLET BY MOUTH ONCE DAILY; Therapy: 80MEF3427 to (Evaluate:13Mar2018) Recorded   5  [facility] Vit D 5000 High-Potency 5000 UNIT CAPS; Therapy: (Recorded:84Kav0137) to Recorded   6  Dulera 100-5 MCG/ACT Inhalation Aerosol; INHALE 2 PUFFS,  BY MOUTH, TWICE   DAILY; Therapy: (Bhavin Gomez) to Recorded   7  Ezetimibe 10 MG Oral Tablet; take one tablet by mouth daily; Therapy: 06VDA8709 to (Evaluate:16Shf5659)  Requested for: 53HXJ6707; Last   Rx:05Nov2017 Ordered   8  Glucosamine Chondr Complex 500-400 MG Oral Capsule; Therapy: (Recorded:04Jun2014) to Recorded   9  Ipratropium Bromide 0 02 % Inhalation Solution; USE 1 UNIT DOSE VIA NEBULIZER  4   TIMES A DAY AS NEEDED; Therapy: 65THS6759 to Recorded   10  Lisinopril 10 MG Oral Tablet; Take 1 tablet daily; Therapy: 17EGS1102 to  Requested for: 70REM9525 Recorded   11  Metoprolol Tartrate 25 MG Oral Tablet; TAKE 1 TABLET DAILY; Therapy: 01DEX7653 to Recorded   12  Nasacort AQ 55 MCG/ACT AERO; bid; Therapy: 79SNY0064 to Recorded   13  Pantoprazole Sodium 40 MG Oral Tablet Delayed Release; TAKE 1 TABLET DAILY     Requested for: 69JGI8974; Last Rx:11Oct2017 Ordered   14  ProAir  (90 Base) MCG/ACT Inhalation Aerosol Solution; Therapy: 42FAW6092 to Recorded    The medication list was reviewed and updated today  Allergies    1  No Known Drug Allergies    Immunizations   ** Printed in Appendix #1 below  Vitals  Signs    Temperature: 97 8 F, Tympanic  Heart Rate: 67  Pulse Quality: Normal  Respiration Quality: Normal  Respiration: 16  Systolic: 416, LUE, Sitting  Diastolic: 74, LUE, Sitting  BP Cuff Size: Large  Height: 5 ft 4 57 in  Weight: 158 lb 3 oz  BMI Calculated: 26 68  BSA Calculated: 1 78  O2 Saturation: 99, RA  LMP: premenopause  Pain Scale: 0    Physical Exam    Constitutional   General appearance: No acute distress, well appearing and well nourished  Eyes   Conjunctiva and lids: No swelling, erythema or discharge  Pupils and irises: Equal, round, reactive to light  Pulmonary   Respiratory effort: No increased work of breathing or signs of respiratory distress  Auscultation of lungs: Clear to auscultation  Cardiovascular   Auscultation of heart: Normal rate and rhythm, normal S1 and S2, no murmurs  Examination of extremities for edema and/or varicosities: Normal     Abdomen   Abdomen: Non-tender, no masses  Lymphatic   Palpation of lymph nodes in neck: No lymphadenopathy  Musculoskeletal   Gait and station: Normal     Range of motion: Normal     Muscle strength/tone: Normal     Neurologic   Cranial nerves: Cranial nerves II-XII intact  Psychiatric   Judgment and insight: Normal     Orientation to person, place, and time: Normal     Recent and remote memory: Intact      Mood and affect: Normal        Results/Data  *VB - Urinary Incontinence Screen (Dx Z13 89 Screen for UI) 21ZDK6003 01:16PM Johnathan Warner     Test Name Result Flag Reference   Urinary Incontinence Assessment 55VWN5382 *VB-Depression Screening 32GCN2167 01:16PM Blandford Felix     Test Name Result Flag Reference   Depression Scale Result      Depression Screen - Negative For Symptoms     *VB - Fall Risk Assessment  (Dx Z13 89 Screen for Neurologic Disorder) 31SWO0540 01:15PM Blandford Felix     Test Name Result Flag Reference   Falls Risk      No falls in the past year       Future Appointments    Date/Time Provider Specialty Site   2018 01:15 PM MARIA LUISA James   Obstetrics/Gynecology [facility]     Signatures   Electronically signed by : Leslye Davalos DO; Dec 13 2017  1:40PM EST                       (Author)    Appendix #1     Patient: Rylan Lynne ; : 1940; MRN: [id number]      1 2 3 4 5 6    Influenza  26-Oct-2012  (72y) 16-Oct-2013  (73y) 19-Oct-2014  (74y) 28-Oct-2015  (75y) 31-Oct-2016  (76y) 11-Oct-2017  (77y)    PCV  21-May-2015  (75y)         [facility]  [phone]  (65y)         [place]

## 2018-01-31 ENCOUNTER — TELEPHONE (OUTPATIENT)
Dept: FAMILY MEDICINE CLINIC | Facility: CLINIC | Age: 78
End: 2018-01-31

## 2018-01-31 NOTE — TELEPHONE ENCOUNTER
Pt called into the office and stated that she needs a refill on her Pantoprazole 40 mg  [facility] she [facility]       [phone]

## 2018-02-02 DIAGNOSIS — K21.9 GASTROESOPHAGEAL REFLUX DISEASE WITHOUT ESOPHAGITIS: Primary | ICD-10-CM

## 2018-02-02 RX ORDER — PANTOPRAZOLE SODIUM 40 MG/1
40 TABLET, DELAYED RELEASE ORAL DAILY
Qty: 30 TABLET | Refills: 0 | OUTPATIENT
Start: 2018-02-02 | End: 2018-02-03 | Stop reason: SDUPTHER

## 2018-02-02 RX ORDER — PANTOPRAZOLE SODIUM 40 MG/1
1 TABLET, DELAYED RELEASE ORAL DAILY
COMMUNITY
End: 2018-02-02 | Stop reason: SDUPTHER

## 2018-02-03 DIAGNOSIS — K21.9 GASTROESOPHAGEAL REFLUX DISEASE WITHOUT ESOPHAGITIS: ICD-10-CM

## 2018-02-03 RX ORDER — PANTOPRAZOLE SODIUM 40 MG/1
TABLET, DELAYED RELEASE ORAL
Qty: 90 TABLET | Refills: 0 | Status: SHIPPED | OUTPATIENT
Start: 2018-02-03 | End: 2018-02-06 | Stop reason: SDUPTHER

## 2018-02-04 DIAGNOSIS — K21.9 GASTROESOPHAGEAL REFLUX DISEASE WITHOUT ESOPHAGITIS: ICD-10-CM

## 2018-02-04 RX ORDER — PANTOPRAZOLE SODIUM 40 MG/1
40 TABLET, DELAYED RELEASE ORAL DAILY
Qty: 90 TABLET | Refills: 0 | Status: CANCELLED | OUTPATIENT
Start: 2018-02-04

## 2018-02-06 DIAGNOSIS — K21.9 GASTROESOPHAGEAL REFLUX DISEASE WITHOUT ESOPHAGITIS: ICD-10-CM

## 2018-02-06 RX ORDER — PANTOPRAZOLE SODIUM 40 MG/1
40 TABLET, DELAYED RELEASE ORAL DAILY
Qty: 90 TABLET | Refills: 0 | Status: SHIPPED | OUTPATIENT
Start: 2018-02-06 | End: 2018-02-06 | Stop reason: SDUPTHER

## 2018-02-19 DIAGNOSIS — E78.49 OTHER HYPERLIPIDEMIA: Primary | ICD-10-CM

## 2018-02-19 RX ORDER — CETIRIZINE HYDROCHLORIDE 10 MG/1
TABLET ORAL
Refills: 3 | COMMUNITY
Start: 2017-12-31

## 2018-02-19 RX ORDER — EZETIMIBE 10 MG/1
10 TABLET ORAL DAILY
Qty: 90 TABLET | Refills: 1 | Status: SHIPPED | OUTPATIENT
Start: 2018-02-19 | End: 2018-06-13 | Stop reason: ALTCHOICE

## 2018-02-19 RX ORDER — EZETIMIBE 10 MG/1
1 TABLET ORAL DAILY
COMMUNITY
Start: 2017-02-04 | End: 2018-02-19 | Stop reason: SDUPTHER

## 2018-02-21 RX ORDER — PANTOPRAZOLE SODIUM 40 MG/1
40 TABLET, DELAYED RELEASE ORAL DAILY
Qty: 90 TABLET | Refills: 0 | Status: SHIPPED | OUTPATIENT
Start: 2018-02-21 | End: 2018-12-12 | Stop reason: SDUPTHER

## 2018-04-19 ENCOUNTER — TRANSCRIBE ORDERS (OUTPATIENT)
Dept: ADMINISTRATIVE | Facility: HOSPITAL | Age: 78
End: 2018-04-19

## 2018-04-19 DIAGNOSIS — Z12.39 SCREENING BREAST EXAMINATION: Primary | ICD-10-CM

## 2018-05-03 ENCOUNTER — ANNUAL EXAM (OUTPATIENT)
Dept: OBGYN CLINIC | Facility: MEDICAL CENTER | Age: 78
End: 2018-05-03
Payer: MEDICARE

## 2018-05-03 VITALS — SYSTOLIC BLOOD PRESSURE: 124 MMHG | WEIGHT: 160 LBS | DIASTOLIC BLOOD PRESSURE: 72 MMHG | BODY MASS INDEX: 26.98 KG/M2

## 2018-05-03 DIAGNOSIS — C56.9 CARCINOMA OF OVARY, UNSPECIFIED LATERALITY (HCC): Primary | ICD-10-CM

## 2018-05-03 DIAGNOSIS — Z78.0 POSTMENOPAUSAL: ICD-10-CM

## 2018-05-03 DIAGNOSIS — Z12.39 SCREENING FOR MALIGNANT NEOPLASM OF BREAST: ICD-10-CM

## 2018-05-03 DIAGNOSIS — Z12.72 SCREENING FOR VAGINAL CANCER: ICD-10-CM

## 2018-05-03 PROCEDURE — G0101 CA SCREEN;PELVIC/BREAST EXAM: HCPCS | Performed by: OBSTETRICS & GYNECOLOGY

## 2018-05-03 PROCEDURE — G0145 SCR C/V CYTO,THINLAYER,RESCR: HCPCS | Performed by: OBSTETRICS & GYNECOLOGY

## 2018-05-03 NOTE — PROGRESS NOTES
ASSESSMENT & PLAN: Cali Negron is a 77 y o  No obstetric history on file  with normal gynecologic exam     1   Routine well woman exam done today  2  Pap and HPV:  The patient's last pap was 2014 - requested today although had prior RUBI/BSO  Pap and cotesting was done today  Current ASCCP Guidelines reviewed  3   Mammogram ordered  4  Colonoscopy - up to date  11  DEXA   6  The following were reviewed in today's visit: breast self exam, mammography screening ordered and DEXA ordered  CC:  Annual Gynecologic Examination    HPI: Cali Negron is a 77 y o  No obstetric history on file  who presents for annual gynecologic examination  She has the following concerns:  No GYN complaints    Health Maintenance:    She wears her seatbelt routinely  She does perform regular monthly self breast exams  She feels safe at home  Last PAP & HPV: 2014  Last mammogram: 2017   Last colonoscopy: up to date    Past Medical History:   Diagnosis Date    Allergic rhinitis     Last Assessed:5/21/2015    Benign essential hypertension     Last Assessed:4/24/2014    Tachycardia     Last Assessed:12/22/2012       Past Surgical History:   Procedure Laterality Date    HYSTERECTOMY         Past OB/Gyn History:  OB History     No data available        History of abnormal pap smears: No        Family History   Problem Relation Age of Onset    Breast cancer Mother     Heart attack Father        Social History:  Social History     Social History    Marital status: /Civil Union     Spouse name: N/A    Number of children: N/A    Years of education: N/A     Occupational History    Not on file       Social History Main Topics    Smoking status: Never Smoker    Smokeless tobacco: Never Used    Alcohol use No    Drug use: No    Sexual activity: No     Other Topics Concern    Not on file     Social History Narrative    No narrative on file       No Known Allergies      Current Outpatient Prescriptions:    cetirizine (ZyrTEC) 10 mg tablet, TK 1 T PO NIGHTLY, Disp: , Rfl: 3    ezetimibe (ZETIA) 10 mg tablet, Take 1 tablet (10 mg total) by mouth daily, Disp: 90 tablet, Rfl: 1    pantoprazole (PROTONIX) 40 mg tablet, Take 1 tablet (40 mg total) by mouth daily, Disp: 90 tablet, Rfl: 0    Review of Systems  Constitutional :no fever, feels well, no tiredness, no recent weight gain or loss  ENT: no ear ache, no loss of hearing, no nosebleeds or nasal discharge, no sore throat or hoarseness  Cardiovascular: no complaints of slow or fast heart beat, no chest pain, no palpitations, no leg claudication or lower extremity edema  Respiratory: no complaints of shortness of shortness of breath, no WARNER  Breasts:no complaints of breast pain, breast lump, or nipple discharge  Gastrointestinal: no complaints of abdominal pain, constipation, nausea, vomiting, or diarrhea or bloody stools  Genitourinary : no complaints of dysuria, incontinence, pelvic pain, no dysmenorrhea, vaginal discharge or abnormal vaginal bleeding and as noted in HPI  Musculoskeletal: no complaints of arthralgia, no myalgia, no joint swelling or stiffness, no limb pain or swelling    Integumentary: no complaints of skin rash or lesion, itching or dry skin  Neurological: no complaints of headache, no confusion, no numbness or tingling, no dizziness or fainting    Objective      /72   Wt 72 6 kg (160 lb)   BMI 26 98 kg/m²   General:   appears stated age, cooperative, alert normal mood and affect   Neck: normal, supple,trachea midline, no masses   Heart: regular rate and rhythm, S1, S2 normal, no murmur, click, rub or gallop   Lungs: clear to auscultation bilaterally   Breasts: normal appearance, no masses or tenderness, Inspection negative, No nipple retraction or dimpling, No nipple discharge or bleeding, No axillary or supraclavicular adenopathy, Normal to palpation without dominant masses   Abdomen: soft, non-tender, without masses or organomegaly Vulva: normal, normal female genitalia, Bartholin's, Urethra, [place] normal, no lesions   Vagina: normal vagina, no discharge, exudate, lesion, or erythema   Urethra: normal   Cervix: Absent  Uterus: uterus absent   Adnexa: surgically absent   Lymphatic palpation of lymph nodes in neck, axilla, groin and/or other locations: no lymphadenopathy or masses noted   Skin normal skin turgor and no rashes     Psychiatric orientation to person, place, and time: normal  mood and affect: normal

## 2018-05-08 LAB
LAB AP GYN PRIMARY INTERPRETATION: NORMAL
Lab: NORMAL

## 2018-05-16 ENCOUNTER — HOSPITAL ENCOUNTER (OUTPATIENT)
Dept: BONE DENSITY | Facility: MEDICAL CENTER | Age: 78
Discharge: HOME | End: 2018-05-16
Payer: MEDICARE

## 2018-05-16 ENCOUNTER — HOSPITAL ENCOUNTER (OUTPATIENT)
Dept: MAMMOGRAPHY | Facility: MEDICAL CENTER | Age: 78
Discharge: HOME | End: 2018-05-16
Payer: MEDICARE

## 2018-05-16 DIAGNOSIS — Z12.39 SCREENING FOR MALIGNANT NEOPLASM OF BREAST: ICD-10-CM

## 2018-05-16 DIAGNOSIS — Z78.0 POSTMENOPAUSAL: ICD-10-CM

## 2018-05-16 PROCEDURE — 77067 SCR MAMMO BI INCL CAD: CPT

## 2018-05-16 PROCEDURE — 77063 BREAST TOMOSYNTHESIS BI: CPT

## 2018-05-16 PROCEDURE — 77080 DXA BONE DENSITY AXIAL: CPT

## 2018-06-05 LAB
ALBUMIN SERPL-MCNC: 4.2 G/DL (ref 3.6–5.1)
ALBUMIN/GLOB SERPL: 1.7 (CALC) (ref 1–2.5)
ALP SERPL-CCNC: 69 U/L (ref 33–130)
AST SERPL-CCNC: 20 U/L (ref 10–35)
BASOPHILS # BLD AUTO: 62 CELLS/UL (ref 0–200)
BASOPHILS NFR BLD AUTO: 1.2 %
BILIRUB SERPL-MCNC: 0.5 MG/DL (ref 0.2–1.2)
BUN SERPL-MCNC: 17 MG/DL (ref 7–25)
BUN/CREAT SERPL: NORMAL (CALC) (ref 6–22)
CALCIUM SERPL-MCNC: 9.6 MG/DL (ref 8.6–10.4)
CHLORIDE SERPL-SCNC: 106 MMOL/L (ref 98–110)
CHOLEST SERPL-MCNC: 181 MG/DL
CHOLEST/HDLC SERPL: 2.2 (CALC)
CO2 SERPL-SCNC: 29 MMOL/L (ref 20–31)
CREAT SERPL-MCNC: 0.8 MG/DL (ref 0.6–0.93)
EOSINOPHIL # BLD AUTO: 1024 CELLS/UL (ref 15–500)
EOSINOPHIL NFR BLD AUTO: 19.7 %
ERYTHROCYTE [DISTWIDTH] IN BLOOD BY AUTOMATED COUNT: 13.1 % (ref 11–15)
GLOBULIN SER CALC-MCNC: 2.5 G/DL (CALC) (ref 1.9–3.7)
GLUCOSE SERPL-MCNC: 99 MG/DL (ref 65–99)
HCT VFR BLD AUTO: 35.6 % (ref 35–45)
HDLC SERPL-MCNC: 83 MG/DL
HGB BLD-MCNC: 11.5 G/DL (ref 11.7–15.5)
LDLC SERPL CALC-MCNC: 82 MG/DL (CALC)
LYMPHOCYTES # BLD AUTO: 1300 CELLS/UL (ref 850–3900)
LYMPHOCYTES NFR BLD AUTO: 25 %
MCH RBC QN AUTO: 28 PG (ref 27–33)
MCHC RBC AUTO-ENTMCNC: 32.3 G/DL (ref 32–36)
MCV RBC AUTO: 86.6 FL (ref 80–100)
MONOCYTES # BLD AUTO: 421 CELLS/UL (ref 200–950)
MONOCYTES NFR BLD AUTO: 8.1 %
NEUTROPHILS # BLD AUTO: 2392 CELLS/UL (ref 1500–7800)
NEUTROPHILS NFR BLD AUTO: 46 %
NONHDLC SERPL-MCNC: 98 MG/DL (CALC)
PLATELET # BLD AUTO: 187 THOUSAND/UL (ref 140–400)
PMV BLD REES-ECKER: 10.5 FL (ref 7.5–12.5)
POTASSIUM SERPL-SCNC: 4.7 MMOL/L (ref 3.5–5.3)
PROT SERPL-MCNC: 6.7 G/DL (ref 6.1–8.1)
RBC # BLD AUTO: 4.11 MILLION/UL (ref 3.8–5.1)
SL AMB EGFR AFRICAN AMERICAN: 81 ML/MIN/1.73M2
SL AMB EGFR NON AFRICAN AMERICAN: 70 ML/MIN/1.73M2
SODIUM SERPL-SCNC: 141 MMOL/L (ref 135–146)
TRIGL SERPL-MCNC: 79 MG/DL
TSH SERPL-ACNC: 1.51 MIU/L (ref 0.4–4.5)
WBC # BLD AUTO: 5.2 THOUSAND/UL (ref 3.8–10.8)

## 2018-06-11 RX ORDER — ALPRAZOLAM 0.25 MG/1
1 TABLET ORAL 2 TIMES DAILY PRN
COMMUNITY
Start: 2017-12-13

## 2018-06-11 RX ORDER — CLOPIDOGREL BISULFATE 75 MG/1
1 TABLET ORAL DAILY
COMMUNITY
Start: 2017-12-13 | End: 2019-06-17 | Stop reason: SDUPTHER

## 2018-06-11 RX ORDER — ASPIRIN 81 MG/1
1 TABLET ORAL DAILY
COMMUNITY
Start: 2017-12-13

## 2018-06-11 RX ORDER — AZELASTINE HCL 205.5 UG/1
SPRAY NASAL 2 TIMES DAILY
COMMUNITY
Start: 2017-11-24 | End: 2018-06-13

## 2018-06-11 RX ORDER — SODIUM PHOSPHATE,MONO-DIBASIC 19G-7G/118
1 ENEMA (ML) RECTAL DAILY
COMMUNITY

## 2018-06-11 RX ORDER — ATORVASTATIN CALCIUM 40 MG/1
1 TABLET, FILM COATED ORAL DAILY
COMMUNITY
Start: 2017-12-13 | End: 2019-12-17 | Stop reason: SDUPTHER

## 2018-06-11 RX ORDER — ALBUTEROL SULFATE 90 UG/1
AEROSOL, METERED RESPIRATORY (INHALATION) AS NEEDED
COMMUNITY
Start: 2017-12-13 | End: 2021-12-27 | Stop reason: SDUPTHER

## 2018-06-11 RX ORDER — LISINOPRIL 10 MG/1
1 TABLET ORAL DAILY
COMMUNITY
Start: 2013-04-24 | End: 2018-06-13 | Stop reason: SDUPTHER

## 2018-06-11 RX ORDER — CHLORHEXIDINE/GLYCERIN/HE-CELL
1 JELLY (GRAM) TOPICAL DAILY
COMMUNITY
End: 2018-06-13

## 2018-06-11 RX ORDER — CELECOXIB 200 MG/1
1 CAPSULE ORAL DAILY
COMMUNITY
Start: 2015-12-02 | End: 2018-12-21 | Stop reason: SDUPTHER

## 2018-06-11 RX ORDER — SIMVASTATIN 20 MG
1 TABLET ORAL DAILY
COMMUNITY
Start: 2017-02-04 | End: 2018-06-13 | Stop reason: CLARIF

## 2018-06-11 RX ORDER — TRIAMCINOLONE ACETONIDE 55 UG/1
SPRAY, METERED NASAL 2 TIMES DAILY
COMMUNITY
Start: 2015-05-21 | End: 2018-06-13

## 2018-06-12 PROBLEM — M85.80 OSTEOPENIA: Status: ACTIVE | Noted: 2017-05-02

## 2018-06-12 PROBLEM — J32.9 CHRONIC SINUSITIS: Status: ACTIVE | Noted: 2017-11-24

## 2018-06-12 PROBLEM — I25.10 CORONARY ARTERY DISEASE INVOLVING NATIVE CORONARY ARTERY OF NATIVE HEART WITHOUT ANGINA PECTORIS: Status: ACTIVE | Noted: 2017-12-20

## 2018-06-12 PROBLEM — I21.4 NSTEMI (NON-ST ELEVATED MYOCARDIAL INFARCTION) (HCC): Status: ACTIVE | Noted: 2017-12-05

## 2018-06-12 PROBLEM — J45.30 MILD PERSISTENT ASTHMA WITHOUT COMPLICATION: Status: ACTIVE | Noted: 2017-12-06

## 2018-06-12 PROBLEM — I50.21 ACUTE SYSTOLIC CHF (CONGESTIVE HEART FAILURE) (HCC): Status: ACTIVE | Noted: 2017-12-20

## 2018-06-12 PROBLEM — R91.8 GROUND GLASS OPACITY PRESENT ON IMAGING OF LUNG: Status: ACTIVE | Noted: 2017-12-22

## 2018-06-13 ENCOUNTER — OFFICE VISIT (OUTPATIENT)
Dept: FAMILY MEDICINE CLINIC | Facility: CLINIC | Age: 78
End: 2018-06-13
Payer: MEDICARE

## 2018-06-13 VITALS
SYSTOLIC BLOOD PRESSURE: 140 MMHG | TEMPERATURE: 97.7 F | BODY MASS INDEX: 26.59 KG/M2 | HEIGHT: 65 IN | HEART RATE: 84 BPM | WEIGHT: 159.6 LBS | RESPIRATION RATE: 16 BRPM | DIASTOLIC BLOOD PRESSURE: 90 MMHG | OXYGEN SATURATION: 95 %

## 2018-06-13 DIAGNOSIS — K21.00 GASTROESOPHAGEAL REFLUX DISEASE WITH ESOPHAGITIS: ICD-10-CM

## 2018-06-13 DIAGNOSIS — J32.9 CHRONIC SINUSITIS, UNSPECIFIED LOCATION: ICD-10-CM

## 2018-06-13 DIAGNOSIS — I10 BENIGN ESSENTIAL HYPERTENSION: Primary | ICD-10-CM

## 2018-06-13 DIAGNOSIS — E78.2 MIXED HYPERLIPIDEMIA: ICD-10-CM

## 2018-06-13 DIAGNOSIS — I25.10 CORONARY ARTERY DISEASE INVOLVING NATIVE CORONARY ARTERY OF NATIVE HEART WITHOUT ANGINA PECTORIS: ICD-10-CM

## 2018-06-13 DIAGNOSIS — E05.90 HYPERTHYROIDISM: ICD-10-CM

## 2018-06-13 DIAGNOSIS — K86.2 PANCREATIC CYST: ICD-10-CM

## 2018-06-13 PROCEDURE — 99214 OFFICE O/P EST MOD 30 MIN: CPT | Performed by: FAMILY MEDICINE

## 2018-06-13 RX ORDER — FLUTICASONE PROPIONATE 50 MCG
2 SPRAY, SUSPENSION (ML) NASAL DAILY
Qty: 3 BOTTLE | Refills: 3 | Status: SHIPPED | OUTPATIENT
Start: 2018-06-13 | End: 2019-06-17 | Stop reason: SDUPTHER

## 2018-06-13 RX ORDER — LISINOPRIL 10 MG/1
10 TABLET ORAL DAILY
Qty: 90 TABLET | Refills: 1 | Status: SHIPPED | OUTPATIENT
Start: 2018-06-13 | End: 2018-12-12 | Stop reason: SDUPTHER

## 2018-06-13 NOTE — ASSESSMENT & PLAN NOTE
Stable on current Dulera inhaler which she takes at a low dose of 1 puff twice daily    Continue follow-up with Pulmonary as needed

## 2018-07-02 RX ORDER — METOPROLOL SUCCINATE 50 MG/1
TABLET, EXTENDED RELEASE ORAL
Refills: 3 | COMMUNITY
Start: 2018-06-19 | End: 2019-06-17 | Stop reason: SDUPTHER

## 2018-07-03 ENCOUNTER — OFFICE VISIT (OUTPATIENT)
Dept: FAMILY MEDICINE CLINIC | Facility: CLINIC | Age: 78
End: 2018-07-03
Payer: MEDICARE

## 2018-07-03 VITALS
HEART RATE: 93 BPM | OXYGEN SATURATION: 95 % | DIASTOLIC BLOOD PRESSURE: 86 MMHG | WEIGHT: 159.1 LBS | SYSTOLIC BLOOD PRESSURE: 144 MMHG | BODY MASS INDEX: 26.19 KG/M2 | TEMPERATURE: 98.5 F

## 2018-07-03 DIAGNOSIS — K59.00 CONSTIPATION, UNSPECIFIED CONSTIPATION TYPE: Primary | ICD-10-CM

## 2018-07-03 PROCEDURE — 99213 OFFICE O/P EST LOW 20 MIN: CPT | Performed by: FAMILY MEDICINE

## 2018-07-03 RX ORDER — BUDESONIDE 0.5 MG/2ML
INHALANT ORAL
Refills: 3 | COMMUNITY
Start: 2018-06-27

## 2018-07-03 NOTE — PROGRESS NOTES
Assessment/Plan:     Diagnoses and all orders for this visit:    Constipation, unspecified constipation type        Patient can continue to take fiber supplements and stool softeners as needed  Also recommended MiraLax powder if other measures are unsuccessful  Encouraged increased water intake  Call if symptoms persist       Subjective:      Patient ID: Kulwant Verduzco is a 77 y o  female  Patient presents to discuss problems with constipation  She has had issues on and off with this but more recently it was severe within the last week  She began taking fiber supplements as well as stool softeners and was able to successfully move her bowels 2 days ago and she feels much better today  She is concerned because she is going away on vacation to [place] soon and would like to discuss a regimen that she can follow  The following portions of the patient's history were reviewed and updated as appropriate: allergies, current medications, past family history, past medical history, past social history, past surgical history and problem list     Review of Systems   Constitutional: Negative  Respiratory: Negative  Cardiovascular: Negative  Gastrointestinal: Positive for constipation  Genitourinary: Negative  Musculoskeletal: Negative  Psychiatric/Behavioral: Negative  Objective:      /86 (BP Location: Left arm, Patient Position: Sitting)   Pulse 93   Temp 98 5 °F (36 9 °C) (Tympanic)   Wt 72 2 kg (159 lb 1 6 oz)   SpO2 95%   BMI 26 19 kg/m²          Physical Exam   Constitutional: She is oriented to person, place, and time  She appears well-developed and well-nourished  No distress  Neurological: She is alert and oriented to person, place, and time  Psychiatric: She has a normal mood and affect   Her behavior is normal  Judgment and thought content normal

## 2018-07-19 ENCOUNTER — OFFICE VISIT (OUTPATIENT)
Dept: FAMILY MEDICINE CLINIC | Facility: CLINIC | Age: 78
End: 2018-07-19
Payer: MEDICARE

## 2018-07-19 VITALS
WEIGHT: 161.2 LBS | TEMPERATURE: 99.2 F | SYSTOLIC BLOOD PRESSURE: 130 MMHG | BODY MASS INDEX: 26.86 KG/M2 | OXYGEN SATURATION: 99 % | DIASTOLIC BLOOD PRESSURE: 72 MMHG | HEIGHT: 65 IN | RESPIRATION RATE: 18 BRPM | HEART RATE: 80 BPM

## 2018-07-19 DIAGNOSIS — R50.9 FEVER, UNSPECIFIED FEVER CAUSE: ICD-10-CM

## 2018-07-19 DIAGNOSIS — J01.90 ACUTE SINUSITIS, RECURRENCE NOT SPECIFIED, UNSPECIFIED LOCATION: Primary | ICD-10-CM

## 2018-07-19 LAB
BASOPHILS # BLD AUTO: 0.05 THOUSANDS/ΜL (ref 0–0.1)
BASOPHILS NFR BLD AUTO: 1 % (ref 0–1)
EOSINOPHIL # BLD AUTO: 0.32 THOUSAND/ΜL (ref 0–0.61)
EOSINOPHIL NFR BLD AUTO: 4 % (ref 0–6)
ERYTHROCYTE [DISTWIDTH] IN BLOOD BY AUTOMATED COUNT: 14.6 % (ref 11.6–15.1)
HCT VFR BLD AUTO: 34.8 % (ref 34.8–46.1)
HGB BLD-MCNC: 10.9 G/DL (ref 11.5–15.4)
IMM GRANULOCYTES # BLD AUTO: 0.03 THOUSAND/UL (ref 0–0.2)
IMM GRANULOCYTES NFR BLD AUTO: 0 % (ref 0–2)
LYMPHOCYTES # BLD AUTO: 1.64 THOUSANDS/ΜL (ref 0.6–4.47)
LYMPHOCYTES NFR BLD AUTO: 20 % (ref 14–44)
MCH RBC QN AUTO: 27.5 PG (ref 26.8–34.3)
MCHC RBC AUTO-ENTMCNC: 31.3 G/DL (ref 31.4–37.4)
MCV RBC AUTO: 88 FL (ref 82–98)
MONOCYTES # BLD AUTO: 1.1 THOUSAND/ΜL (ref 0.17–1.22)
MONOCYTES NFR BLD AUTO: 14 % (ref 4–12)
NEUTROPHILS # BLD AUTO: 5 THOUSANDS/ΜL (ref 1.85–7.62)
NEUTS SEG NFR BLD AUTO: 61 % (ref 43–75)
NRBC BLD AUTO-RTO: 0 /100 WBCS
PLATELET # BLD AUTO: 190 THOUSANDS/UL (ref 149–390)
PMV BLD AUTO: 10.7 FL (ref 8.9–12.7)
RBC # BLD AUTO: 3.96 MILLION/UL (ref 3.81–5.12)
SL AMB  POCT GLUCOSE, UA: NEGATIVE
SL AMB LEUKOCYTE ESTERASE,UA: NEGATIVE
SL AMB POCT BILIRUBIN,UA: NEGATIVE
SL AMB POCT BLOOD,UA: ABNORMAL
SL AMB POCT CLARITY,UA: CLEAR
SL AMB POCT COLOR,UA: YELLOW
SL AMB POCT KETONES,UA: NEGATIVE
SL AMB POCT NITRITE,UA: NEGATIVE
SL AMB POCT PH,UA: 5.5
SL AMB POCT SPECIFIC GRAVITY,UA: 1.01
SL AMB POCT URINE PROTEIN: NEGATIVE
SL AMB POCT UROBILINOGEN: 0.2
WBC # BLD AUTO: 8.14 THOUSAND/UL (ref 4.31–10.16)

## 2018-07-19 PROCEDURE — 81003 URINALYSIS AUTO W/O SCOPE: CPT | Performed by: FAMILY MEDICINE

## 2018-07-19 PROCEDURE — 87086 URINE CULTURE/COLONY COUNT: CPT | Performed by: FAMILY MEDICINE

## 2018-07-19 PROCEDURE — 99213 OFFICE O/P EST LOW 20 MIN: CPT | Performed by: FAMILY MEDICINE

## 2018-07-19 PROCEDURE — 85025 COMPLETE CBC W/AUTO DIFF WBC: CPT | Performed by: FAMILY MEDICINE

## 2018-07-19 PROCEDURE — 86618 LYME DISEASE ANTIBODY: CPT | Performed by: FAMILY MEDICINE

## 2018-07-19 PROCEDURE — 36415 COLL VENOUS BLD VENIPUNCTURE: CPT | Performed by: FAMILY MEDICINE

## 2018-07-19 RX ORDER — CEFDINIR 250 MG/5ML
POWDER, FOR SUSPENSION ORAL
Qty: 120 ML | Refills: 0 | Status: SHIPPED | OUTPATIENT
Start: 2018-07-19 | End: 2018-07-28

## 2018-07-19 NOTE — PROGRESS NOTES
Assessment/Plan:  Patient is a 77 female fever body aches  She does have some signs and symptoms a sinusitis and so I am prescribing an antibiotic  But I did do a blood count and Lyme titer has her symptoms seem to come quickly  I also checked a urine sample as sometimes people may not have symptoms of a UTI and could become septic easily  No problem-specific Assessment & Plan notes found for this encounter  Diagnoses and all orders for this visit:    Acute sinusitis, recurrence not specified, unspecified location  -     cefdinir (OMNICEF) 250 mg/5 mL suspension; 6 ml twice a day    Fever, unspecified fever cause  -     CBC and differential  -     Lyme Antibody Profile with reflex to WB  -     POCT urine dip auto non-scope  -     Urine culture          Subjective:   Chief Complaint   Patient presents with    Fever     body aches        Patient ID: Pamela Major is a 77 y o  female  Went golfing on Tuesday, got caught in rain - felt achey, head hurt  Fever is not going down  Denies rash  The following portions of the patient's history were reviewed and updated as appropriate: allergies, current medications, past family history, past medical history, past social history, past surgical history and problem list     Review of Systems   Constitutional: Positive for chills and fever  HENT: Positive for congestion and postnasal drip  Respiratory: Negative  Cardiovascular: Negative  Musculoskeletal: Positive for arthralgias  Objective:      /72 (BP Location: Left arm, Patient Position: Sitting, Cuff Size: Large)   Pulse 80   Temp 99 2 °F (37 3 °C) (Tympanic)   Resp 18   Ht 5' 5 32" (1 659 m)   Wt 73 1 kg (161 lb 3 2 oz)   SpO2 99%   Breastfeeding? No   BMI 26 56 kg/m²          Physical Exam   Constitutional: She is oriented to person, place, and time  She appears well-developed  No distress  HENT:   TM's dull  Thick post nasal drainage  Tenderness over sinuses     Neck: Carotid bruit is not present  No thyromegaly present  Cardiovascular: Normal rate, regular rhythm and normal heart sounds  Pulmonary/Chest: Effort normal and breath sounds normal    Musculoskeletal: She exhibits no edema  Lymphadenopathy:     She has no cervical adenopathy  Neurological: She is alert and oriented to person, place, and time  Skin: Skin is warm and dry  Psychiatric: She has a normal mood and affect  Nursing note and vitals reviewed

## 2018-07-20 LAB
B BURGDOR IGG SER IA-ACNC: 0.09
B BURGDOR IGM SER IA-ACNC: 0.11
BACTERIA UR CULT: NORMAL

## 2018-08-16 NOTE — PROGRESS NOTES
Assessment/Plan:    Gastroesophageal reflux disease with esophagitis  Stable well controlled with minimal symptoms on pantoprazole    Pancreatic cyst  Stable  Last MRI showed only cyst which has not changed from previous studies  Hyperthyroidism  Stable with normal TSH    Mild persistent asthma without complication  Stable on current Dulera inhaler which she takes at a low dose of 1 puff twice daily  Continue follow-up with Pulmonary as needed    Benign essential hypertension  Well controlled on lisinopril and metoprolol  Coronary artery disease involving native coronary artery of native heart without angina pectoris  Stable  Regular follow-up with Cardiology  Continue Plavix atorvastatin and risk modification       Diagnoses and all orders for this visit:    Benign essential hypertension  -     lisinopril (ZESTRIL) 10 mg tablet; Take 1 tablet (10 mg total) by mouth daily  -     Comprehensive metabolic panel; Future  -     Comprehensive metabolic panel    Chronic sinusitis, unspecified location  -     fluticasone (FLONASE) 50 mcg/act nasal spray; 2 sprays into each nostril daily    Hyperthyroidism    Pancreatic cyst    Gastroesophageal reflux disease with esophagitis    Mixed hyperlipidemia  -     Comprehensive metabolic panel; Future  -     Lipid Panel with Direct LDL reflex; Future  -     TSH, 3rd generation; Future  -     Comprehensive metabolic panel  -     Lipid Panel with Direct LDL reflex  -     TSH, 3rd generation    Coronary artery disease involving native coronary artery of native heart without angina pectoris          Subjective:      Patient ID: Rachel Mueller is a 77 y o  female  Routine follow-up of chronic medical problems including hypertension, hyperlipidemia, asthma, coronary artery disease and hyperthyroidism  She takes atorvastatin for her lipids    She was previously also taking Zetia but while in [place] this past winter her cholesterol levels were so low that her Zetia was discontinued  She takes lisinopril and metoprolol for her blood pressure  She takes pantoprazole for her reflux  She has a Dulera inhaler for her asthma though it has been better over the last 6-12 months  She sees her pulmonary doctor on a regular basis  She also sees Cardiology  She is taking Plavix for her coronary artery disease  She has no new concerns or complaints at this time  The following portions of the patient's history were reviewed and updated as appropriate: allergies, current medications, past family history, past medical history, past social history, past surgical history and problem list     Review of Systems   Constitutional: Negative  Respiratory: Negative  Cardiovascular: Negative  Gastrointestinal: Negative  Genitourinary: Negative  Musculoskeletal: Negative  Psychiatric/Behavioral: Negative  Objective:      /90 (BP Location: Left arm, Patient Position: Sitting, Cuff Size: Standard)   Pulse 84   Temp 97 7 °F (36 5 °C) (Tympanic)   Resp 16   Ht 5' 5 35" (1 66 m)   Wt 72 4 kg (159 lb 9 6 oz)   SpO2 95%   BMI 26 27 kg/m²          Physical Exam   Constitutional: She is oriented to person, place, and time  She appears well-developed and well-nourished  No distress  HENT:   Head: Normocephalic and atraumatic  Eyes: Conjunctivae are normal  Pupils are equal, round, and reactive to light  Right eye exhibits no discharge  Neck: Normal range of motion  No thyromegaly present  Cardiovascular: Normal rate and regular rhythm  Pulmonary/Chest: Effort normal and breath sounds normal  No respiratory distress  Lymphadenopathy:     She has no cervical adenopathy  Neurological: She is alert and oriented to person, place, and time  Skin: Skin is warm and dry  She is not diaphoretic  Psychiatric: She has a normal mood and affect  Her behavior is normal  Judgment and thought content normal    Nursing note and vitals reviewed  Never smoker

## 2018-10-17 ENCOUNTER — IMMUNIZATION (OUTPATIENT)
Dept: FAMILY MEDICINE CLINIC | Facility: CLINIC | Age: 78
End: 2018-10-17
Payer: MEDICARE

## 2018-10-17 DIAGNOSIS — Z23 NEED FOR INFLUENZA VACCINATION: Primary | ICD-10-CM

## 2018-10-17 PROCEDURE — 90662 IIV NO PRSV INCREASED AG IM: CPT | Performed by: FAMILY MEDICINE

## 2018-10-17 PROCEDURE — G0008 ADMIN INFLUENZA VIRUS VAC: HCPCS | Performed by: FAMILY MEDICINE

## 2018-12-07 LAB
ALBUMIN SERPL-MCNC: 3.8 G/DL (ref 3.6–5.1)
ALBUMIN/GLOB SERPL: 1.5 (CALC) (ref 1–2.5)
ALP SERPL-CCNC: 93 U/L (ref 33–130)
ALT SERPL-CCNC: 21 U/L (ref 6–29)
AST SERPL-CCNC: 25 U/L (ref 10–35)
BILIRUB SERPL-MCNC: 0.5 MG/DL (ref 0.2–1.2)
BUN SERPL-MCNC: 19 MG/DL (ref 7–25)
BUN/CREAT SERPL: NORMAL (CALC) (ref 6–22)
CALCIUM SERPL-MCNC: 9 MG/DL (ref 8.6–10.4)
CHLORIDE SERPL-SCNC: 105 MMOL/L (ref 98–110)
CHOLEST SERPL-MCNC: 153 MG/DL
CHOLEST/HDLC SERPL: 2.1 (CALC)
CO2 SERPL-SCNC: 29 MMOL/L (ref 20–32)
CREAT SERPL-MCNC: 0.68 MG/DL (ref 0.6–0.93)
GLOBULIN SER CALC-MCNC: 2.5 G/DL (CALC) (ref 1.9–3.7)
GLUCOSE SERPL-MCNC: 92 MG/DL (ref 65–99)
HDLC SERPL-MCNC: 74 MG/DL
LDLC SERPL CALC-MCNC: 64 MG/DL (CALC)
NONHDLC SERPL-MCNC: 79 MG/DL (CALC)
POTASSIUM SERPL-SCNC: 4.3 MMOL/L (ref 3.5–5.3)
PROT SERPL-MCNC: 6.3 G/DL (ref 6.1–8.1)
SL AMB EGFR AFRICAN AMERICAN: 96 ML/MIN/1.73M2
SL AMB EGFR NON AFRICAN AMERICAN: 83 ML/MIN/1.73M2
SODIUM SERPL-SCNC: 140 MMOL/L (ref 135–146)
TRIGL SERPL-MCNC: 70 MG/DL
TSH SERPL-ACNC: 1.24 MIU/L (ref 0.4–4.5)

## 2018-12-12 ENCOUNTER — OFFICE VISIT (OUTPATIENT)
Dept: FAMILY MEDICINE CLINIC | Facility: CLINIC | Age: 78
End: 2018-12-12
Payer: MEDICARE

## 2018-12-12 VITALS
DIASTOLIC BLOOD PRESSURE: 88 MMHG | HEART RATE: 86 BPM | BODY MASS INDEX: 25.94 KG/M2 | SYSTOLIC BLOOD PRESSURE: 120 MMHG | RESPIRATION RATE: 16 BRPM | TEMPERATURE: 98.7 F | HEIGHT: 66 IN | OXYGEN SATURATION: 96 % | WEIGHT: 161.4 LBS

## 2018-12-12 DIAGNOSIS — K21.9 GASTROESOPHAGEAL REFLUX DISEASE WITHOUT ESOPHAGITIS: ICD-10-CM

## 2018-12-12 DIAGNOSIS — Z23 NEED FOR VACCINATION: ICD-10-CM

## 2018-12-12 DIAGNOSIS — Z00.00 MEDICARE ANNUAL WELLNESS VISIT, SUBSEQUENT: Primary | ICD-10-CM

## 2018-12-12 DIAGNOSIS — E78.2 MIXED HYPERLIPIDEMIA: ICD-10-CM

## 2018-12-12 DIAGNOSIS — I10 BENIGN ESSENTIAL HYPERTENSION: ICD-10-CM

## 2018-12-12 PROCEDURE — 99214 OFFICE O/P EST MOD 30 MIN: CPT | Performed by: FAMILY MEDICINE

## 2018-12-12 PROCEDURE — G0439 PPPS, SUBSEQ VISIT: HCPCS | Performed by: FAMILY MEDICINE

## 2018-12-12 RX ORDER — LISINOPRIL 10 MG/1
10 TABLET ORAL DAILY
Qty: 90 TABLET | Refills: 1 | Status: SHIPPED | OUTPATIENT
Start: 2018-12-12 | End: 2019-03-09 | Stop reason: SDUPTHER

## 2018-12-12 RX ORDER — PANTOPRAZOLE SODIUM 40 MG/1
40 TABLET, DELAYED RELEASE ORAL DAILY
Qty: 90 TABLET | Refills: 0 | Status: SHIPPED | OUTPATIENT
Start: 2018-12-12 | End: 2019-03-27 | Stop reason: SDUPTHER

## 2018-12-12 NOTE — PROGRESS NOTES
Assessment and Plan:  Problem List Items Addressed This Visit        Cardiovascular and Mediastinum    Benign essential hypertension    Relevant Medications    lisinopril (ZESTRIL) 10 mg tablet       Other    Hyperlipidemia    Relevant Orders    Comprehensive metabolic panel    Lipid Panel with Direct LDL reflex    TSH, 3rd generation      Other Visit Diagnoses     Medicare annual wellness visit, subsequent    -  Primary    Questionnaire reviewed  No new concerns  Immunizations and screenings up-to-date  Advance directive in place      Gastroesophageal reflux disease without esophagitis        Relevant Medications    pantoprazole (PROTONIX) 40 mg tablet    Need for vaccination            Health Maintenance Due   Topic Date Due    Medicare Annual Wellness Visit (AWV)  1940    DTaP,Tdap,and Td Vaccines (1 - Tdap) 01/15/1960         HPI:  Patient Active Problem List   Diagnosis    Abnormality of esophagus    Acute systolic CHF (congestive heart failure) (HCC)    Malignant neoplasm of ovary (HCC)    Anxiety    Asthma    Benign essential hypertension    Chronic sinusitis    Coronary artery disease involving native coronary artery of native heart without angina pectoris    Environmental and seasonal allergies    Gastroesophageal reflux disease with esophagitis    Ground glass opacity present on imaging of lung    Hyperlipidemia    Hyperthyroidism    Lung nodule    Mild persistent asthma without complication    Multinodular non-toxic goiter    NSTEMI (non-ST elevated myocardial infarction) ([facility] [address] [facility]    Osteoarthritis    Osteopenia    Pancreatic cyst    Pulmonary nodule, right    Vitamin D deficiency     Past Medical History:   Diagnosis Date    Allergic rhinitis     Last Assessed:5/21/2015    Benign essential hypertension     Last Assessed:4/24/2014    Tachycardia     Last Assessed:12/22/2012     Past Surgical History:   Procedure Laterality Date    HYSTERECTOMY       Family History Problem Relation Age of Onset    Breast cancer Mother     Heart attack Father      History   Smoking Status    Never Smoker   Smokeless Tobacco    Never Used     History   Alcohol Use No      History   Drug Use No         Current Outpatient Prescriptions   Medication Sig Dispense Refill    albuterol (PROAIR HFA) 90 mcg/act inhaler Inhale      aspirin (ADULT ASPIRIN EC LOW STRENGTH) 81 mg EC tablet Take 1 tablet by mouth daily      atorvastatin (LIPITOR) 40 mg tablet Take 1 tablet by mouth daily      cetirizine (ZyrTEC) 10 mg tablet TK 1 T PO NIGHTLY  3    Cholecalciferol ([employer] VIT D 5000 HIGH-POTENCY) 5000 units capsule Take 2,000 Units by mouth daily        clopidogrel (PLAVIX) 75 mg tablet Take 1 tablet by mouth daily      fluticasone (FLONASE) 50 mcg/act nasal spray 2 sprays into each nostril daily 3 Bottle 3    Glucosamine-Chondroitin 500-400 MG CAPS Take 1 tablet by mouth daily      lisinopril (ZESTRIL) 10 mg tablet Take 1 tablet (10 mg total) by mouth daily 90 tablet 1    metoprolol succinate (TOPROL-XL) 50 mg 24 hr tablet TK 1 T PO D  3    mometasone-formoterol (DULERA) 100-5 MCG/ACT inhaler Inhale 2 puffs 2 (two) times a day      pantoprazole (PROTONIX) 40 mg tablet Take 1 tablet (40 mg total) by mouth daily 90 tablet 0    ALPRAZolam (XANAX) 0 25 mg tablet Take 1 tablet by mouth 2 (two) times a day as needed      budesonide (PULMICORT) 0 5 mg/2 mL nebulizer solution EMPTY ONE RESPULE INTO YOUR SINUS RINSE BOTTLE AND LAVAGE NOSTRILS BID  3    celecoxib (CELEBREX) 200 mg capsule Take 1 capsule by mouth daily      ipratropium (ATROVENT) 0 02 % nebulizer solution Inhale 1 each 4 (four) times a day as needed       No current facility-administered medications for this visit        Allergies   Allergen Reactions    Cat Hair Extract     Molds & Smuts     Other     Pollen Extract      Immunization History   Administered Date(s) Administered    H1N1, All Formulations 01/11/2010    Influenza 10/02/2013, 10/24/2014, 03/23/2015, 10/19/2015, 10/31/2016, 11/14/2016, 10/11/2017    Influenza Split High Dose Preservative Free IM 10/19/2014, 10/28/2015, 10/31/2016, 10/11/2017    Influenza TIV (IM) 10/26/2012, 10/16/2013    Influenza, high dose seasonal 0 5 mL 10/17/2018    Pneumococcal Conjugate 13-Valent 05/21/2015    Pneumococcal Polysaccharide PPV23 11/09/2005, 03/16/2015    Zoster 01/01/2012       Patient Care Team:  Ashley Graham DO as PCP - General  Sophie Crespo MD as PCP - OBGYN (Obstetrics and Gynecology)  Sophie Crespo MD    Medicare Screening Tests and Risk Assessments:  Ernestina Dumont is here for her Subsequent Wellness visit  Last Medicare Wellness visit information reviewed, patient interviewed, no change since last AWV  Health Risk Assessment:  Patient rates overall health as excellent  Patient feels that their physical health rating is Much better  Eyesight was rated as Same  Hearing was rated as Same  Patient feels that their emotional and mental health rating is Much better  Pain experienced by patient in the last 7 days has been None  Emotional/Mental Health:  Patient has been feeling nervous/anxious  PHQ-9 Depression Screening:    Frequency of the following problems over the past two weeks:      1  Little interest or pleasure in doing things: 0 - not at all      2  Feeling down, depressed, or hopeless: 0 - not at all  PHQ-2 Score: 0          Broken Bones/Falls: Fall Risk Assessment:    In the past year, patient has experienced: No history of falling in past year          Bladder/Bowel:  Patient has not leaked urine accidently in the last six months  Patient reports no loss of bowel control  Immunizations:  Patient has had a flu vaccination within the last year  Patient has received a pneumonia shot  Patient has received a shingles shot  Home Safety:  Patient does not have trouble with stairs inside or outside of their home     Patient currently reports that there are no safety hazards present in home, working smoke alarms, working carbon monoxide detectors  Preventative Screenings:   Breast cancer screening performed, colon cancer screen completed, cholesterol screen completed, glaucoma eye exam completed,     Nutrition:  Current diet: Low Cholesterol, Low Saturated Fat and Limited junk food with servings of the following:    Medications:  Patient is currently taking over-the-counter supplements  Patient is able to manage medications  Lifestyle Choices:  Patient reports no tobacco use  Patient has not smoked or used tobacco in the past   Patient reports no alcohol use  Patient drives a vehicle  Patient wears seat belt  Activities of Daily Living:  Can get out of bed by his or her self, able to dress self, able to make own meals, able to do own shopping, able to bathe self, can do own laundry/housekeeping, can manage own money, pay bills and track expenses    Previous Hospitalizations:  Hospitalization or ED visit in past 12 months        Advanced Directives:  Patient has decided on a power of   Patient has spoken to designated power of   Patient has completed advanced directive  Preventative Screening/Counseling:      Cardiovascular:      General: Screening Current          Diabetes:      General: Screening Current          Colorectal Cancer:      General: Screening Current          Breast Cancer:      General: Screening Current          Cervical Cancer:      General: Screening Current          Osteoporosis:      General: Screening Current          AAA:      General: Screening Not Indicated          Glaucoma:      General: Risks and Benefits Discussed          HIV:      General: Screening Not Indicated          Hepatitis C:      General: Screening Not Indicated        Advanced Directives:   has durable POA for healthcare, patient has an advanced directive  Information on ACP and/or AD not provided       Immunizations:      Influenza: Influenza UTD This Year and Influenza Recommended Annually      Pneumococcal: Lifetime Vaccine Completed      Shingrix: Risks & Benefits Discussed      Hepatitis B (Low risk patients): Series Not Indicated      Zostavax: Zostavax Vaccine UTD      TD: Vaccine Status Unknown      TDAP: Vaccine Status Unknown            No exam data present    Physical Exam:  Review of Systems   Gastrointestinal: Negative for bowel incontinence  Psychiatric/Behavioral: The patient is not nervous/anxious  Vitals:    12/12/18 1307   BP: 120/88   BP Location: Right arm   Patient Position: Sitting   Cuff Size: Standard   Pulse: 86   Resp: 16   Temp: 98 7 °F (37 1 °C)   TempSrc: Tympanic   SpO2: 96%   Weight: 73 2 kg (161 lb 6 4 oz)   Height: 5' 5 95" (1 675 m)   Body mass index is 26 09 kg/m²      Physical Exam

## 2018-12-16 NOTE — PROGRESS NOTES
Assessment/Plan:    Hyperlipidemia  Total cholesterol 153 with an LDL 64  Continue with atorvastatin  Hypertension  Stable on lisinopril and metoprolol  Continue with current dosage  Reflux stable well controlled on Protonix  Thyroid disorder  Stable  On no medication  TSH normal       Remainder of blood work including CMP within normal limits  Recheck in 6 months with labs prior to next visit  Diagnoses and all orders for this visit:    Medicare annual wellness visit, subsequent  Comments:  Questionnaire reviewed  No new concerns  Immunizations and screenings up-to-date  Advance directive in place  Benign essential hypertension  -     lisinopril (ZESTRIL) 10 mg tablet; Take 1 tablet (10 mg total) by mouth daily    Gastroesophageal reflux disease without esophagitis  -     pantoprazole (PROTONIX) 40 mg tablet; Take 1 tablet (40 mg total) by mouth daily    Mixed hyperlipidemia  -     Comprehensive metabolic panel; Future  -     Lipid Panel with Direct LDL reflex; Future  -     TSH, 3rd generation; Future  -     Comprehensive metabolic panel  -     Lipid Panel with Direct LDL reflex  -     TSH, 3rd generation    Need for vaccination  -     Zoster Vac Recomb Adjuvanted (SHINGRIX) 50 MCG/0 5ML SUSR; Inject 50 mcg into a muscle once for 1 dose          Subjective:      Patient ID: Kelly Pinedo is a 77 y o  female  Patient presented for routine follow-up chronic medical problems and to review recent blood work  She has no new concerns at this time feels well  Chronic problems include reflux, hypothyroidism, mild asthma, hypertension, coronary artery disease  Medications include atorvastatin for lipids, Plavix for coronary artery disease, lisinopril and metoprolol for high blood pressure  She takes pantoprazole for her reflux in uses Dulera and Pulmicort for her asthma  She also does use Atrovent nebulizer solution as needed          The following portions of the patient's history were reviewed and updated as appropriate: allergies, current medications, past family history, past medical history, past social history, past surgical history and problem list     Review of Systems   Constitutional: Negative  Respiratory: Negative  Cardiovascular: Negative  Gastrointestinal: Negative  Genitourinary: Negative  Musculoskeletal: Negative  Psychiatric/Behavioral: Negative  Objective:      /88 (BP Location: Right arm, Patient Position: Sitting, Cuff Size: Standard)   Pulse 86   Temp 98 7 °F (37 1 °C) (Tympanic)   Resp 16   Ht 5' 5 95" (1 675 m)   Wt 73 2 kg (161 lb 6 4 oz)   SpO2 96%   BMI 26 09 kg/m²          Physical Exam   Constitutional: She is oriented to person, place, and time  She appears well-developed and well-nourished  No distress  HENT:   Head: Normocephalic and atraumatic  Eyes: Pupils are equal, round, and reactive to light  Conjunctivae are normal  Right eye exhibits no discharge  Neck: Normal range of motion  No thyromegaly present  Cardiovascular: Normal rate and regular rhythm  Pulmonary/Chest: Effort normal and breath sounds normal  No respiratory distress  Lymphadenopathy:     She has no cervical adenopathy  Neurological: She is alert and oriented to person, place, and time  Skin: Skin is warm and dry  She is not diaphoretic  Psychiatric: She has a normal mood and affect  Her behavior is normal  Judgment and thought content normal    Nursing note and vitals reviewed

## 2018-12-21 ENCOUNTER — OFFICE VISIT (OUTPATIENT)
Dept: FAMILY MEDICINE CLINIC | Facility: CLINIC | Age: 78
End: 2018-12-21
Payer: MEDICARE

## 2018-12-21 ENCOUNTER — TELEPHONE (OUTPATIENT)
Dept: FAMILY MEDICINE CLINIC | Facility: CLINIC | Age: 78
End: 2018-12-21

## 2018-12-21 VITALS
OXYGEN SATURATION: 98 % | HEART RATE: 97 BPM | HEIGHT: 65 IN | RESPIRATION RATE: 17 BRPM | BODY MASS INDEX: 26.51 KG/M2 | DIASTOLIC BLOOD PRESSURE: 80 MMHG | TEMPERATURE: 98.3 F | SYSTOLIC BLOOD PRESSURE: 122 MMHG | WEIGHT: 159.1 LBS

## 2018-12-21 DIAGNOSIS — K86.2 PANCREATIC CYST: ICD-10-CM

## 2018-12-21 DIAGNOSIS — K86.2 PANCREATIC CYST: Primary | ICD-10-CM

## 2018-12-21 DIAGNOSIS — M25.552 PAIN OF LEFT HIP JOINT: ICD-10-CM

## 2018-12-21 DIAGNOSIS — R11.0 NAUSEA: ICD-10-CM

## 2018-12-21 DIAGNOSIS — M19.90 ARTHRITIS: Primary | ICD-10-CM

## 2018-12-21 PROCEDURE — 99213 OFFICE O/P EST LOW 20 MIN: CPT | Performed by: FAMILY MEDICINE

## 2018-12-21 RX ORDER — ONDANSETRON 4 MG/1
4 TABLET, FILM COATED ORAL EVERY 8 HOURS PRN
Qty: 20 TABLET | Refills: 0 | Status: SHIPPED | OUTPATIENT
Start: 2018-12-21

## 2018-12-21 RX ORDER — CELECOXIB 200 MG/1
200 CAPSULE ORAL DAILY
Qty: 90 CAPSULE | Refills: 2 | Status: SHIPPED | OUTPATIENT
Start: 2018-12-21 | End: 2019-06-17 | Stop reason: SDUPTHER

## 2018-12-21 NOTE — PROGRESS NOTES
Assessment/Plan:      Recommended patient restart Celebrex at 200 mg daily  Check x-ray of left hip  Recheck MRI of abdomen to follow-up on pancreatic cyst      Diagnoses and all orders for this visit:    Arthritis  -     celecoxib (CELEBREX) 200 mg capsule; Take 1 capsule (200 mg total) by mouth daily    Pain of left hip joint  -     XR hip/pelv 2-3 vws left if performed; Future    Pancreatic cyst  -     MRI abdomen wo contrast; Future    Nausea  -     ondansetron (ZOFRAN) 4 mg tablet; Take 1 tablet (4 mg total) by mouth every 8 (eight) hours as needed for nausea or vomiting          Subjective:      Patient ID: Breanna Foster is a 77 y o  female  Patient presents with a chief complaint left hip pain  This is chronic but somewhat worse recently  It is on the outer aspect of her left hip  It is worse with weight-bearing  She does have mildly decreased range of motion to her hip  No history of trauma  She previously had taken Celebrex what but has been off of that for the last year  She felt that her musculoskeletal symptoms were well controlled on that medication  She also would like to have a recheck on her pancreatic cyst   Last MRI of the abdomen was approximately 15 months ago which had showed no progression the lesion  She has no symptoms related to that at this time  The following portions of the patient's history were reviewed and updated as appropriate: allergies, current medications, past family history, past medical history, past social history, past surgical history and problem list     Review of Systems   Constitutional: Negative  Respiratory: Negative  Cardiovascular: Negative  Gastrointestinal: Negative  Genitourinary: Negative  Musculoskeletal: Positive for arthralgias  Psychiatric/Behavioral: Negative            Objective:      /80 (BP Location: Left arm, Patient Position: Sitting, Cuff Size: Adult)   Pulse 97   Temp 98 3 °F (36 8 °C) (Tympanic) Resp 17   Ht 5' 5" (1 651 m)   Wt 72 2 kg (159 lb 1 6 oz)   SpO2 98%   BMI 26 48 kg/m²          Physical Exam   Musculoskeletal:     Decreased range of motion left hip with tenderness over greater trochanter

## 2018-12-21 NOTE — TELEPHONE ENCOUNTER
Malena Harper called form [facility] to schedule an MRI   Dr Damian you ordered pt an MRI of her abdomen wo contrast they are  asking why? They usually do their MRI"s with and with out can you please re write pt;s script?  Please fax to [phone]

## 2019-03-09 DIAGNOSIS — I10 BENIGN ESSENTIAL HYPERTENSION: ICD-10-CM

## 2019-03-11 RX ORDER — LISINOPRIL 10 MG/1
TABLET ORAL
Qty: 90 TABLET | Refills: 0 | Status: SHIPPED | OUTPATIENT
Start: 2019-03-11 | End: 2019-06-17 | Stop reason: SDUPTHER

## 2019-03-27 DIAGNOSIS — K21.9 GASTROESOPHAGEAL REFLUX DISEASE WITHOUT ESOPHAGITIS: ICD-10-CM

## 2019-03-27 RX ORDER — PANTOPRAZOLE SODIUM 40 MG/1
40 TABLET, DELAYED RELEASE ORAL DAILY
Qty: 90 TABLET | Refills: 1 | Status: SHIPPED | OUTPATIENT
Start: 2019-03-27 | End: 2019-06-17 | Stop reason: SDUPTHER

## 2019-05-15 ENCOUNTER — ANNUAL EXAM (OUTPATIENT)
Dept: OBGYN CLINIC | Facility: MEDICAL CENTER | Age: 79
End: 2019-05-15
Payer: MEDICARE

## 2019-05-15 VITALS — SYSTOLIC BLOOD PRESSURE: 130 MMHG | WEIGHT: 164 LBS | DIASTOLIC BLOOD PRESSURE: 70 MMHG | BODY MASS INDEX: 27.29 KG/M2

## 2019-05-15 DIAGNOSIS — Z01.419 ENCOUNTER FOR WELL WOMAN EXAM WITH ROUTINE GYNECOLOGICAL EXAM: ICD-10-CM

## 2019-05-15 DIAGNOSIS — Z01.419 ENCOUNTER FOR GYNECOLOGICAL EXAMINATION WITH PAPANICOLAOU SMEAR OF CERVIX: Primary | ICD-10-CM

## 2019-05-15 DIAGNOSIS — Z12.31 ENCOUNTER FOR SCREENING MAMMOGRAM FOR MALIGNANT NEOPLASM OF BREAST: ICD-10-CM

## 2019-05-15 PROCEDURE — G0101 CA SCREEN;PELVIC/BREAST EXAM: HCPCS | Performed by: OBSTETRICS & GYNECOLOGY

## 2019-05-15 PROCEDURE — 87624 HPV HI-RISK TYP POOLED RSLT: CPT | Performed by: OBSTETRICS & GYNECOLOGY

## 2019-05-15 PROCEDURE — 1160F RVW MEDS BY RX/DR IN RCRD: CPT | Performed by: OBSTETRICS & GYNECOLOGY

## 2019-05-15 PROCEDURE — G0145 SCR C/V CYTO,THINLAYER,RESCR: HCPCS | Performed by: OBSTETRICS & GYNECOLOGY

## 2019-05-15 RX ORDER — DIPHENOXYLATE HYDROCHLORIDE AND ATROPINE SULFATE 2.5; .025 MG/1; MG/1
1 TABLET ORAL DAILY
COMMUNITY

## 2019-05-17 LAB
HPV HR 12 DNA CVX QL NAA+PROBE: NEGATIVE
HPV16 DNA CVX QL NAA+PROBE: NEGATIVE
HPV18 DNA CVX QL NAA+PROBE: NEGATIVE

## 2019-05-20 LAB
LAB AP GYN PRIMARY INTERPRETATION: NORMAL
Lab: NORMAL

## 2019-06-07 LAB
ALBUMIN SERPL-MCNC: 3.9 G/DL (ref 3.6–5.1)
ALBUMIN/GLOB SERPL: 1.8 (CALC) (ref 1–2.5)
ALP SERPL-CCNC: 74 U/L (ref 33–130)
ALT SERPL-CCNC: 17 U/L (ref 6–29)
AST SERPL-CCNC: 21 U/L (ref 10–35)
BILIRUB SERPL-MCNC: 0.5 MG/DL (ref 0.2–1.2)
BUN SERPL-MCNC: 17 MG/DL (ref 7–25)
BUN/CREAT SERPL: NORMAL (CALC) (ref 6–22)
CALCIUM SERPL-MCNC: 9.2 MG/DL (ref 8.6–10.4)
CHLORIDE SERPL-SCNC: 105 MMOL/L (ref 98–110)
CHOLEST SERPL-MCNC: 158 MG/DL
CHOLEST/HDLC SERPL: 2.2 (CALC)
CO2 SERPL-SCNC: 29 MMOL/L (ref 20–32)
CREAT SERPL-MCNC: 0.79 MG/DL (ref 0.6–0.88)
GLOBULIN SER CALC-MCNC: 2.2 G/DL (CALC) (ref 1.9–3.7)
GLUCOSE SERPL-MCNC: 88 MG/DL (ref 65–99)
HDLC SERPL-MCNC: 72 MG/DL
LDLC SERPL CALC-MCNC: 72 MG/DL (CALC)
NONHDLC SERPL-MCNC: 86 MG/DL (CALC)
POTASSIUM SERPL-SCNC: 4.4 MMOL/L (ref 3.5–5.3)
PROT SERPL-MCNC: 6.1 G/DL (ref 6.1–8.1)
SL AMB EGFR AFRICAN AMERICAN: 82 ML/MIN/1.73M2
SL AMB EGFR NON AFRICAN AMERICAN: 71 ML/MIN/1.73M2
SODIUM SERPL-SCNC: 140 MMOL/L (ref 135–146)
TRIGL SERPL-MCNC: 59 MG/DL
TSH SERPL-ACNC: 1.69 MIU/L (ref 0.4–4.5)

## 2019-06-12 ENCOUNTER — HOSPITAL ENCOUNTER (OUTPATIENT)
Dept: MAMMOGRAPHY | Facility: MEDICAL CENTER | Age: 79
Discharge: HOME | End: 2019-06-12
Payer: MEDICARE

## 2019-06-12 VITALS — BODY MASS INDEX: 27.32 KG/M2 | WEIGHT: 164 LBS | HEIGHT: 65 IN

## 2019-06-12 DIAGNOSIS — Z12.31 ENCOUNTER FOR SCREENING MAMMOGRAM FOR MALIGNANT NEOPLASM OF BREAST: ICD-10-CM

## 2019-06-12 PROCEDURE — 77067 SCR MAMMO BI INCL CAD: CPT

## 2019-06-12 PROCEDURE — 77063 BREAST TOMOSYNTHESIS BI: CPT

## 2019-06-17 ENCOUNTER — OFFICE VISIT (OUTPATIENT)
Dept: FAMILY MEDICINE CLINIC | Facility: CLINIC | Age: 79
End: 2019-06-17
Payer: MEDICARE

## 2019-06-17 VITALS
HEART RATE: 79 BPM | HEIGHT: 65 IN | WEIGHT: 166 LBS | SYSTOLIC BLOOD PRESSURE: 120 MMHG | DIASTOLIC BLOOD PRESSURE: 82 MMHG | OXYGEN SATURATION: 99 % | BODY MASS INDEX: 27.66 KG/M2 | RESPIRATION RATE: 16 BRPM | TEMPERATURE: 98 F

## 2019-06-17 DIAGNOSIS — J96.02 ACUTE RESPIRATORY FAILURE WITH HYPOXIA AND HYPERCARBIA (HCC): ICD-10-CM

## 2019-06-17 DIAGNOSIS — E78.2 MIXED HYPERLIPIDEMIA: ICD-10-CM

## 2019-06-17 DIAGNOSIS — C56.9 CARCINOMA OF OVARY, UNSPECIFIED LATERALITY (HCC): ICD-10-CM

## 2019-06-17 DIAGNOSIS — I25.10 CORONARY ARTERY DISEASE INVOLVING NATIVE CORONARY ARTERY OF NATIVE HEART WITHOUT ANGINA PECTORIS: Primary | ICD-10-CM

## 2019-06-17 DIAGNOSIS — K21.9 GASTROESOPHAGEAL REFLUX DISEASE WITHOUT ESOPHAGITIS: ICD-10-CM

## 2019-06-17 DIAGNOSIS — I10 BENIGN ESSENTIAL HYPERTENSION: ICD-10-CM

## 2019-06-17 DIAGNOSIS — J96.01 ACUTE RESPIRATORY FAILURE WITH HYPOXIA AND HYPERCARBIA (HCC): ICD-10-CM

## 2019-06-17 DIAGNOSIS — I10 BENIGN ESSENTIAL HYPERTENSION: Primary | ICD-10-CM

## 2019-06-17 DIAGNOSIS — I25.10 CORONARY ARTERY DISEASE INVOLVING NATIVE CORONARY ARTERY OF NATIVE HEART WITHOUT ANGINA PECTORIS: ICD-10-CM

## 2019-06-17 DIAGNOSIS — J32.9 CHRONIC SINUSITIS, UNSPECIFIED LOCATION: ICD-10-CM

## 2019-06-17 DIAGNOSIS — I50.21 ACUTE SYSTOLIC CHF (CONGESTIVE HEART FAILURE) (HCC): ICD-10-CM

## 2019-06-17 DIAGNOSIS — M19.90 ARTHRITIS: ICD-10-CM

## 2019-06-17 DIAGNOSIS — Z23 NEED FOR VACCINATION: ICD-10-CM

## 2019-06-17 DIAGNOSIS — J44.1 COPD WITH ACUTE EXACERBATION (HCC): ICD-10-CM

## 2019-06-17 PROCEDURE — 99214 OFFICE O/P EST MOD 30 MIN: CPT | Performed by: FAMILY MEDICINE

## 2019-06-17 RX ORDER — LISINOPRIL 10 MG/1
10 TABLET ORAL DAILY
Qty: 90 TABLET | Refills: 1 | Status: SHIPPED | OUTPATIENT
Start: 2019-06-17 | End: 2019-12-17 | Stop reason: SDUPTHER

## 2019-06-17 RX ORDER — PANTOPRAZOLE SODIUM 40 MG/1
40 TABLET, DELAYED RELEASE ORAL DAILY
Qty: 90 TABLET | Refills: 1 | Status: SHIPPED | OUTPATIENT
Start: 2019-06-17 | End: 2019-12-17 | Stop reason: SDUPTHER

## 2019-06-17 RX ORDER — METOPROLOL SUCCINATE 50 MG/1
50 TABLET, EXTENDED RELEASE ORAL DAILY
Qty: 90 TABLET | Refills: 1 | Status: SHIPPED | OUTPATIENT
Start: 2019-06-17 | End: 2019-06-17

## 2019-06-17 RX ORDER — CELECOXIB 200 MG/1
200 CAPSULE ORAL DAILY
Qty: 90 CAPSULE | Refills: 2 | Status: SHIPPED | OUTPATIENT
Start: 2019-06-17 | End: 2019-12-17 | Stop reason: SDUPTHER

## 2019-06-17 RX ORDER — METOPROLOL SUCCINATE 50 MG/1
50 TABLET, EXTENDED RELEASE ORAL DAILY
Qty: 90 TABLET | Refills: 1 | Status: SHIPPED | OUTPATIENT
Start: 2019-06-17 | End: 2019-12-17 | Stop reason: SDUPTHER

## 2019-06-17 RX ORDER — CLOPIDOGREL BISULFATE 75 MG/1
75 TABLET ORAL DAILY
Qty: 90 TABLET | Refills: 1 | Status: SHIPPED | OUTPATIENT
Start: 2019-06-17 | End: 2020-06-17

## 2019-06-17 RX ORDER — FLUTICASONE PROPIONATE 50 MCG
2 SPRAY, SUSPENSION (ML) NASAL DAILY
Qty: 3 BOTTLE | Refills: 3 | Status: SHIPPED | OUTPATIENT
Start: 2019-06-17 | End: 2020-02-11

## 2019-08-08 DIAGNOSIS — J32.9 CHRONIC SINUSITIS, UNSPECIFIED LOCATION: ICD-10-CM

## 2019-08-08 RX ORDER — FLUTICASONE PROPIONATE 50 MCG
SPRAY, SUSPENSION (ML) NASAL
Qty: 48 ML | Refills: 0 | Status: SHIPPED | OUTPATIENT
Start: 2019-08-08 | End: 2019-11-13 | Stop reason: SDUPTHER

## 2019-10-30 ENCOUNTER — IMMUNIZATIONS (OUTPATIENT)
Dept: FAMILY MEDICINE CLINIC | Facility: CLINIC | Age: 79
End: 2019-10-30
Payer: MEDICARE

## 2019-10-30 DIAGNOSIS — Z23 ENCOUNTER FOR IMMUNIZATION: ICD-10-CM

## 2019-10-30 PROCEDURE — G0008 ADMIN INFLUENZA VIRUS VAC: HCPCS | Performed by: NURSE PRACTITIONER

## 2019-10-30 PROCEDURE — 90686 IIV4 VACC NO PRSV 0.5 ML IM: CPT | Performed by: NURSE PRACTITIONER

## 2019-11-13 DIAGNOSIS — J32.9 CHRONIC SINUSITIS, UNSPECIFIED LOCATION: ICD-10-CM

## 2019-11-13 RX ORDER — FLUTICASONE PROPIONATE 50 MCG
SPRAY, SUSPENSION (ML) NASAL
Qty: 48 ML | Refills: 0 | Status: SHIPPED | OUTPATIENT
Start: 2019-11-13 | End: 2019-12-17

## 2019-12-14 LAB
ALBUMIN SERPL-MCNC: 4 G/DL (ref 3.6–5.1)
ALBUMIN/GLOB SERPL: 1.5 (CALC) (ref 1–2.5)
ALP SERPL-CCNC: 69 U/L (ref 33–130)
ALT SERPL-CCNC: 17 U/L (ref 6–29)
AST SERPL-CCNC: 25 U/L (ref 10–35)
BILIRUB SERPL-MCNC: 0.5 MG/DL (ref 0.2–1.2)
BUN SERPL-MCNC: 16 MG/DL (ref 7–25)
BUN/CREAT SERPL: NORMAL (CALC) (ref 6–22)
CALCIUM SERPL-MCNC: 9.2 MG/DL (ref 8.6–10.4)
CHLORIDE SERPL-SCNC: 104 MMOL/L (ref 98–110)
CHOLEST SERPL-MCNC: 148 MG/DL
CHOLEST/HDLC SERPL: 2.1 (CALC)
CO2 SERPL-SCNC: 30 MMOL/L (ref 20–32)
CREAT SERPL-MCNC: 0.68 MG/DL (ref 0.6–0.88)
GLOBULIN SER CALC-MCNC: 2.7 G/DL (CALC) (ref 1.9–3.7)
GLUCOSE SERPL-MCNC: 95 MG/DL (ref 65–99)
HDLC SERPL-MCNC: 70 MG/DL
LDLC SERPL CALC-MCNC: 64 MG/DL (CALC)
NONHDLC SERPL-MCNC: 78 MG/DL (CALC)
POTASSIUM SERPL-SCNC: 4.4 MMOL/L (ref 3.5–5.3)
PROT SERPL-MCNC: 6.7 G/DL (ref 6.1–8.1)
SL AMB EGFR AFRICAN AMERICAN: 96 ML/MIN/1.73M2
SL AMB EGFR NON AFRICAN AMERICAN: 83 ML/MIN/1.73M2
SODIUM SERPL-SCNC: 141 MMOL/L (ref 135–146)
TRIGL SERPL-MCNC: 56 MG/DL
TSH SERPL-ACNC: 1.32 MIU/L (ref 0.4–4.5)

## 2019-12-17 ENCOUNTER — OFFICE VISIT (OUTPATIENT)
Dept: FAMILY MEDICINE CLINIC | Facility: CLINIC | Age: 79
End: 2019-12-17
Payer: MEDICARE

## 2019-12-17 VITALS
HEART RATE: 91 BPM | TEMPERATURE: 98 F | OXYGEN SATURATION: 99 % | DIASTOLIC BLOOD PRESSURE: 90 MMHG | HEIGHT: 65 IN | SYSTOLIC BLOOD PRESSURE: 140 MMHG | BODY MASS INDEX: 26.99 KG/M2 | WEIGHT: 162 LBS | RESPIRATION RATE: 16 BRPM

## 2019-12-17 DIAGNOSIS — I10 BENIGN ESSENTIAL HYPERTENSION: ICD-10-CM

## 2019-12-17 DIAGNOSIS — I25.10 CORONARY ARTERY DISEASE INVOLVING NATIVE CORONARY ARTERY OF NATIVE HEART WITHOUT ANGINA PECTORIS: ICD-10-CM

## 2019-12-17 DIAGNOSIS — J45.20 INTERMITTENT ASTHMA WITHOUT COMPLICATION, UNSPECIFIED ASTHMA SEVERITY: ICD-10-CM

## 2019-12-17 DIAGNOSIS — E78.2 MIXED HYPERLIPIDEMIA: ICD-10-CM

## 2019-12-17 DIAGNOSIS — K21.9 GASTROESOPHAGEAL REFLUX DISEASE WITHOUT ESOPHAGITIS: ICD-10-CM

## 2019-12-17 DIAGNOSIS — Z00.00 MEDICARE ANNUAL WELLNESS VISIT, SUBSEQUENT: Primary | ICD-10-CM

## 2019-12-17 DIAGNOSIS — M19.90 ARTHRITIS: ICD-10-CM

## 2019-12-17 PROCEDURE — G0439 PPPS, SUBSEQ VISIT: HCPCS | Performed by: FAMILY MEDICINE

## 2019-12-17 PROCEDURE — 99214 OFFICE O/P EST MOD 30 MIN: CPT | Performed by: FAMILY MEDICINE

## 2019-12-17 RX ORDER — METOPROLOL SUCCINATE 50 MG/1
50 TABLET, EXTENDED RELEASE ORAL DAILY
Qty: 90 TABLET | Refills: 1 | Status: SHIPPED | OUTPATIENT
Start: 2019-12-17 | End: 2020-06-16 | Stop reason: SDUPTHER

## 2019-12-17 RX ORDER — LISINOPRIL 10 MG/1
10 TABLET ORAL DAILY
Qty: 90 TABLET | Refills: 1 | Status: SHIPPED | OUTPATIENT
Start: 2019-12-17 | End: 2020-06-16 | Stop reason: SDUPTHER

## 2019-12-17 RX ORDER — CELECOXIB 200 MG/1
200 CAPSULE ORAL DAILY
Qty: 90 CAPSULE | Refills: 2 | Status: SHIPPED | OUTPATIENT
Start: 2019-12-17 | End: 2020-06-17 | Stop reason: SDUPTHER

## 2019-12-17 RX ORDER — PANTOPRAZOLE SODIUM 40 MG/1
40 TABLET, DELAYED RELEASE ORAL DAILY
Qty: 90 TABLET | Refills: 1 | Status: SHIPPED | OUTPATIENT
Start: 2019-12-17 | End: 2020-06-16 | Stop reason: SDUPTHER

## 2019-12-17 RX ORDER — ATORVASTATIN CALCIUM 20 MG/1
40 TABLET, FILM COATED ORAL DAILY
Qty: 90 TABLET | Refills: 1
Start: 2019-12-17 | End: 2020-06-16 | Stop reason: SDUPTHER

## 2019-12-17 NOTE — ASSESSMENT & PLAN NOTE
Continue current medications for asthma through inhalers or nebulizer    Keep follow-up with pulmonary

## 2019-12-17 NOTE — PATIENT INSTRUCTIONS
Medicare Preventive Visit Patient Instructions  Thank you for completing your Welcome to Medicare Visit or Medicare Annual Wellness Visit today  Your next wellness visit will be due in one year (12/17/2020)  The screening/preventive services that you may require over the next 5-10 years are detailed below  Some tests may not apply to you based off risk factors and/or age  Screening tests ordered at today's visit but not completed yet may show as past due  Also, please note that scanned in results may not display below  Preventive Screenings:  Service Recommendations Previous Testing/Comments   Colorectal Cancer Screening  * Colonoscopy    * Fecal Occult Blood Test (FOBT)/Fecal Immunochemical Test (FIT)  * Fecal DNA/Cologuard Test  * Flexible Sigmoidoscopy Age: 54-65 years old   Colonoscopy: every 10 years (may be performed more frequently if at higher risk)  OR  FOBT/FIT: every 1 year  OR  Cologuard: every 3 years  OR  Sigmoidoscopy: every 5 years  Screening may be recommended earlier than age 48 if at higher risk for colorectal cancer  Also, an individualized decision between you and your healthcare provider will decide whether screening between the ages of 74-80 would be appropriate  Colonoscopy: Not on file  FOBT/FIT: Not on file  Cologuard: Not on file  Sigmoidoscopy: Not on file         Breast Cancer Screening Age: 36 years old  Frequency: every 1-2 years  Not required if history of left and right mastectomy Mammogram: 06/12/2019    Screening Current   Cervical Cancer Screening Between the ages of 21-29, pap smear recommended once every 3 years  Between the ages of 33-67, can perform pap smear with HPV co-testing every 5 years     Recommendations may differ for women with a history of total hysterectomy, cervical cancer, or abnormal pap smears in past  Pap Smear: 05/15/2019    Screening Not Indicated   Hepatitis C Screening Once for adults born between 1945 and 1965  More frequently in patients at high risk for Hepatitis C Hep C Antibody: Not on file       Diabetes Screening 1-2 times per year if you're at risk for diabetes or have pre-diabetes Fasting glucose: No results in last 5 years   A1C: No results in last 5 years    Screening Current   Cholesterol Screening Once every 5 years if you don't have a lipid disorder  May order more often based on risk factors  Lipid panel: 12/13/2019    Screening Not Indicated  History Lipid Disorder     Other Preventive Screenings Covered by Medicare:  1  Abdominal Aortic Aneurysm (AAA) Screening: covered once if your at risk  You're considered to be at risk if you have a family history of AAA  2  Lung Cancer Screening: covers low dose CT scan once per year if you meet all of the following conditions: (1) Age 50-69; (2) No signs or symptoms of lung cancer; (3) Current smoker or have quit smoking within the last 15 years; (4) You have a tobacco smoking history of at least 30 pack years (packs per day multiplied by number of years you smoked); (5) You get a written order from a healthcare provider  3  Glaucoma Screening: covered annually if you're considered high risk: (1) You have diabetes OR (2) Family history of glaucoma OR (3)  aged 48 and older OR (3)  American aged 72 and older  3  Osteoporosis Screening: covered every 2 years if you meet one of the following conditions: (1) You're estrogen deficient and at risk for osteoporosis based off medical history and other findings; (2) Have a vertebral abnormality; (3) On glucocorticoid therapy for more than 3 months; (4) Have primary hyperparathyroidism; (5) On osteoporosis medications and need to assess response to drug therapy  · Last bone density test (DXA Scan): 05/16/2018  5  HIV Screening: covered annually if you're between the age of 12-76  Also covered annually if you are younger than 13 and older than 72 with risk factors for HIV infection   For pregnant patients, it is covered up to 3 times per pregnancy  Immunizations:  Immunization Recommendations   Influenza Vaccine Annual influenza vaccination during flu season is recommended for all persons aged >= 6 months who do not have contraindications   Pneumococcal Vaccine (Prevnar and Pneumovax)  * Prevnar = PCV13  * Pneumovax = PPSV23   Adults 25-60 years old: 1-3 doses may be recommended based on certain risk factors  Adults 72 years old: Prevnar (PCV13) vaccine recommended followed by Pneumovax (PPSV23) vaccine  If already received PPSV23 since turning 65, then PCV13 recommended at least one year after PPSV23 dose  Hepatitis B Vaccine 3 dose series if at intermediate or high risk (ex: diabetes, end stage renal disease, liver disease)   Tetanus (Td) Vaccine - COST NOT COVERED BY MEDICARE PART B Following completion of primary series, a booster dose should be given every 10 years to maintain immunity against tetanus  Td may also be given as tetanus wound prophylaxis  Tdap Vaccine - COST NOT COVERED BY MEDICARE PART B Recommended at least once for all adults  For pregnant patients, recommended with each pregnancy  Shingles Vaccine (Shingrix) - COST NOT COVERED BY MEDICARE PART B  2 shot series recommended in those aged 48 and above     Health Maintenance Due:  There are no preventive care reminders to display for this patient  Immunizations Due:  There are no preventive care reminders to display for this patient  Advance Directives   What are advance directives? Advance directives are legal documents that state your wishes and plans for medical care  These plans are made ahead of time in case you lose your ability to make decisions for yourself  Advance directives can apply to any medical decision, such as the treatments you want, and if you want to donate organs  What are the types of advance directives? There are many types of advance directives, and each state has rules about how to use them   You may choose a combination of any of the following:  · Living will: This is a written record of the treatment you want  You can also choose which treatments you do not want, which to limit, and which to stop at a certain time  This includes surgery, medicine, IV fluid, and tube feedings  · Durable power of  for healthcare [facility]): This is a written record that states who you want to make healthcare choices for you when you are unable to make them for yourself  This person, called a proxy, is usually a family member or a friend  You may choose more than 1 proxy  · Do not resuscitate (DNR) order:  A DNR order is used in case your heart stops beating or you stop breathing  It is a request not to have certain forms of treatment, such as CPR  A DNR order may be included in other types of advance directives  · Medical directive: This covers the care that you want if you are in a coma, near death, or unable to make decisions for yourself  You can list the treatments you want for each condition  Treatment may include pain medicine, surgery, blood transfusions, dialysis, IV or tube feedings, and a ventilator (breathing machine)  · Values history: This document has questions about your views, beliefs, and how you feel and think about life  This information can help others choose the care that you would choose  Why are advance directives important? An advance directive helps you control your care  Although spoken wishes may be used, it is better to have your wishes written down  Spoken wishes can be misunderstood, or not followed  Treatments may be given even if you do not want them  An advance directive may make it easier for your family to make difficult choices about your care  Weight Management   Why it is important to manage your weight:  Being overweight increases your risk of health conditions such as heart disease, high blood pressure, type 2 diabetes, and certain types of cancer   It can also increase your risk for osteoarthritis, sleep apnea, and other respiratory problems  Aim for a slow, steady weight loss  Even a small amount of weight loss can lower your risk of health problems  How to lose weight safely:  A safe and healthy way to lose weight is to eat fewer calories and get regular exercise  You can lose up about 1 pound a week by decreasing the number of calories you eat by 500 calories each day  Healthy meal plan for weight management:  A healthy meal plan includes a variety of foods, contains fewer calories, and helps you stay healthy  A healthy meal plan includes the following:  · Eat whole-grain foods more often  A healthy meal plan should contain fiber  Fiber is the part of grains, fruits, and vegetables that is not broken down by your body  Whole-grain foods are healthy and provide extra fiber in your diet  Some examples of whole-grain foods are whole-wheat breads and pastas, oatmeal, brown rice, and bulgur  · Eat a variety of vegetables every day  Include dark, leafy greens such as spinach, kale, adia greens, and mustard greens  Eat yellow and orange vegetables such as carrots, sweet potatoes, and winter squash  · Eat a variety of fruits every day  Choose fresh or canned fruit (canned in its own juice or light syrup) instead of juice  Fruit juice has very little or no fiber  · Eat low-fat dairy foods  Drink fat-free (skim) milk or 1% milk  Eat fat-free yogurt and low-fat cottage cheese  Try low-fat cheeses such as mozzarella and other reduced-fat cheeses  · Choose meat and other protein foods that are low in fat  Choose beans or other legumes such as split peas or lentils  Choose fish, skinless poultry (chicken or turkey), or lean cuts of red meat (beef or pork)  Before you cook meat or poultry, cut off any visible fat  · Use less fat and oil  Try baking foods instead of frying them  Add less fat, such as margarine, sour cream, regular salad dressing and mayonnaise to foods  Eat fewer high-fat foods   Some examples of high-fat foods include french fries, doughnuts, ice cream, and cakes  · Eat fewer sweets  Limit foods and drinks that are high in sugar  This includes candy, cookies, regular soda, and sweetened drinks  Exercise:  Exercise at least 30 minutes per day on most days of the week  Some examples of exercise include walking, biking, dancing, and swimming  You can also fit in more physical activity by taking the stairs instead of the elevator or parking farther away from stores  Ask your healthcare provider about the best exercise plan for you  © Copyright [employer] 2018 Information is for End User's use only and may not be sold, redistributed or otherwise used for commercial purposes   All illustrations and images included in CareNotes® are the copyrighted property of A D A M , Inc  or [address] [place], [place] [address]tia mary

## 2019-12-17 NOTE — PROGRESS NOTES
Assessment and Plan:     Problem List Items Addressed This Visit     Hyperlipidemia    Relevant Medications    atorvastatin (LIPITOR) 20 mg tablet    Benign essential hypertension    Relevant Medications    lisinopril (ZESTRIL) 10 mg tablet    metoprolol succinate (TOPROL-XL) 50 mg 24 hr tablet    Other Relevant Orders    Comprehensive metabolic panel    TSH, 3rd generation with Free T4 reflex      Other Visit Diagnoses     Medicare annual wellness visit, subsequent    -  Primary    Questionnaire reviewed  Immunization and screening recommendations discussed  Advance directive in place  Gastroesophageal reflux disease without esophagitis        Relevant Medications    pantoprazole (PROTONIX) 40 mg tablet    Arthritis        Relevant Medications    celecoxib (CELEBREX) 200 mg capsule           Preventive health issues were discussed with patient, and age appropriate screening tests were ordered as noted in patient's After Visit Summary  Personalized health advice and appropriate referrals for health education or preventive services given if needed, as noted in patient's After Visit Summary  History of Present Illness:     Patient presents for Welcome to Medicare visit       Patient Care Team:  Cathryn Brannon DO as PCP - Richard Ortiz MD as PCP - OBGYN (Obstetrics and Gynecology)  Sarah Singh MD     Review of Systems:     Review of Systems   Problem List:     Patient Active Problem List   Diagnosis    Abnormality of esophagus    Acute systolic [facility]F (congestive heart failure) [facility] [address] [facility]    Malignant neoplasm of ovary ([facility] [facility] )    Anxiety    Asthma    Benign essential hypertension    Chronic sinusitis    Coronary artery disease involving native coronary artery of native heart without angina pectoris    Environmental and seasonal allergies    Gastroesophageal reflux disease with esophagitis    Ground glass opacity present on imaging of lung    Hyperlipidemia    Hyperthyroidism    Lung nodule  Mild persistent asthma without complication    Multinodular non-toxic goiter    NSTEMI (non-ST elevated myocardial infarction) (HCC)    Osteoarthritis    Osteopenia    Pancreatic cyst    Pulmonary nodule, right    Vitamin D deficiency    COPD with acute exacerbation (HCC)    Acute respiratory failure with hypoxia and hypercarbia (HCC)      Past Medical and Surgical History:     Past Medical History:   Diagnosis Date    Allergic rhinitis     Last Assessed:5/21/2015    Benign essential hypertension     Last Assessed:4/24/2014    Tachycardia     Last Assessed:12/22/2012     Past Surgical History:   Procedure Laterality Date    CARDIAC SURGERY  2017    HYSTERECTOMY      OOPHORECTOMY Bilateral     age 47    THYROIDECTOMY, PARTIAL  2016      Family History:     Family History   Problem Relation Age of Onset    Breast cancer Mother 46    Heart attack Father     Melanoma Daughter 25    No Known Problems Sister     No Known Problems Sister     No Known Problems Maternal Aunt     No Known Problems Maternal Grandmother     No Known Problems Maternal Grandfather     No Known Problems Paternal Grandmother     No Known Problems Paternal Grandfather     BRCA1 Negative Sister     No Known Problems Maternal Aunt     No Known Problems Paternal Aunt       Social History:     Social History     Socioeconomic History    Marital status: /Civil Union     Spouse name: None    Number of children: None    Years of education: None    Highest education level: None   Occupational History    None   Social Needs    Financial resource strain: None    Food insecurity:     Worry: None     Inability: None    Transportation needs:     Medical: None     Non-medical: None   Tobacco Use    Smoking status: Never Smoker    Smokeless tobacco: Never Used   Substance and Sexual Activity    Alcohol use: Yes     Comment: occasional-rare    Drug use: No    Sexual activity: Not Currently     Partners: Male Lifestyle    Physical activity:     Days per week: None     Minutes per session: None    Stress: None   Relationships    Social connections:     Talks on phone: None     Gets together: None     Attends Hinduism service: None     Active member of club or organization: None     Attends meetings of clubs or organizations: None     Relationship status: None    Intimate partner violence:     Fear of current or ex partner: None     Emotionally abused: None     Physically abused: None     Forced sexual activity: None   Other Topics Concern    None   Social History Narrative    None      Medications and Allergies:     Current Outpatient Medications   Medication Sig Dispense Refill    albuterol (PROAIR HFA) 90 mcg/act inhaler Inhale as needed       aspirin (ADULT ASPIRIN EC LOW STRENGTH) 81 mg EC tablet Take 1 tablet by mouth daily      atorvastatin (LIPITOR) 20 mg tablet Take 2 tablets (40 mg total) by mouth daily 90 tablet 1    budesonide (PULMICORT) 0 5 mg/2 mL nebulizer solution EMPTY ONE RESPULE INTO YOUR SINUS RINSE BOTTLE AND LAVAGE NOSTRILS BID  3    celecoxib (CELEBREX) 200 mg capsule Take 1 capsule (200 mg total) by mouth daily 90 capsule 2    cetirizine (ZyrTEC) 10 mg tablet TK 1 T PO NIGHTLY  3    Cholecalciferol ([employer] VIT D 5000 HIGH-POTENCY) 5000 units capsule Take 2,000 Units by mouth daily        fluticasone (FLONASE) 50 mcg/act nasal spray 2 sprays into each nostril daily 3 Bottle 3    Glucosamine-Chondroitin 500-400 MG CAPS Take 1 tablet by mouth daily      ipratropium (ATROVENT) 0 02 % nebulizer solution Inhale 1 each 4 (four) times a day as needed      lisinopril (ZESTRIL) 10 mg tablet Take 1 tablet (10 mg total) by mouth daily 90 tablet 1    metoprolol succinate (TOPROL-XL) 50 mg 24 hr tablet Take 1 tablet (50 mg total) by mouth daily 90 tablet 1    multivitamin (THERAGRAN) TABS Take 1 tablet by mouth daily      pantoprazole (PROTONIX) 40 mg tablet Take 1 tablet (40 mg total) by mouth daily 90 tablet 1    ALPRAZolam (XANAX) 0 25 mg tablet Take 1 tablet by mouth 2 (two) times a day as needed      clopidogrel (PLAVIX) 75 mg tablet Take 1 tablet (75 mg total) by mouth daily (Patient not taking: Reported on 12/17/2019) 90 tablet 1    mometasone-formoterol (DULERA) 100-5 MCG/ACT inhaler Inhale 2 puffs as needed       ondansetron (ZOFRAN) 4 mg tablet Take 1 tablet (4 mg total) by mouth every 8 (eight) hours as needed for nausea or vomiting (Patient not taking: Reported on 5/15/2019) 20 tablet 0     No current facility-administered medications for this visit  Allergies   Allergen Reactions    Cat Hair Extract     Molds & Smuts     Other     Pollen Extract       Immunizations:     Immunization History   Administered Date(s) Administered    H1N1, All Formulations 01/11/2010    INFLUENZA 10/02/2013, 10/24/2014, 03/23/2015, 10/19/2015, 10/31/2016, 11/14/2016, 10/11/2017    Influenza Split High Dose Preservative Free IM 10/19/2014, 10/28/2015, 10/31/2016, 10/11/2017    Influenza TIV (IM) 10/26/2012, 10/16/2013    Influenza, high dose seasonal 0 5 mL 10/17/2018    Influenza, injectable, quadrivalent, preservative free 0 5 mL 10/30/2019    Pneumococcal Conjugate 13-Valent 05/21/2015    Pneumococcal Polysaccharide PPV23 11/09/2005, 03/16/2015    Zoster 01/01/2012    Zoster Vaccine Recombinant 02/06/2019, 06/26/2019      Health Maintenance: There are no preventive care reminders to display for this patient  There are no preventive care reminders to display for this patient  Medicare Screening Tests and Risk Assessments:     Shilpi Alvarez is here for her Subsequent Wellness visit  Last Medicare Wellness visit information reviewed, patient interviewed and updates made to the record today  Health Risk Assessment:   Patient rates overall health as good  Patient feels that their physical health rating is same  Eyesight was rated as same  Hearing was rated as same   Patient feels that their emotional and mental health rating is same  Pain experienced in the last 7 days has been none  Patient states that she has experienced no weight loss or gain in last 6 months  Depression Screening:   PHQ-2 Score: 0      Fall Risk Screening: In the past year, patient has experienced: no history of falling in past year      Urinary Incontinence Screening:   Patient has not leaked urine accidently in the last six months  Home Safety:  Patient does not have trouble with stairs inside or outside of their home  Patient has working smoke alarms and has working carbon monoxide detector  Home safety hazards include: none  Nutrition:   Current diet is Regular  Medications:   Patient is currently taking over-the-counter supplements  OTC medications include: see medication list  Patient is able to manage medications  Activities of Daily Living (ADLs)/Instrumental Activities of Daily Living (IADLs):   Walk and transfer into and out of bed and chair?: Yes  Dress and groom yourself?: Yes    Bathe or shower yourself?: Yes    Feed yourself? Yes  Do your laundry/housekeeping?: Yes  Manage your money, pay your bills and track your expenses?: Yes  Make your own meals?: Yes    Do your own shopping?: Yes    Previous Hospitalizations:   Any hospitalizations or ED visits within the last 12 months?: No      Advance Care Planning:   Living will: Yes    Durable POA for healthcare:  Yes    Advanced directive: Yes    Advanced directive counseling given: No    Five wishes given: Yes    End of Life Decisions reviewed with patient: No    Provider agrees with end of life decisions: Yes      Cognitive Screening:   Provider or family/friend/caregiver concerned regarding cognition?: No    PREVENTIVE SCREENINGS      Cardiovascular Screening:    General: Screening Not Indicated and History Lipid Disorder      Diabetes Screening:     General: Screening Current      Breast Cancer Screening:     General: Screening Current Cervical Cancer Screening:    General: Screening Not Indicated      Abdominal Aortic Aneurysm (AAA) Screening:        General: Screening Not Indicated      Lung Cancer Screening:     General: Screening Not Indicated      Hepatitis C Screening:    General: Screening Not Indicated    No exam data present     Physical Exam:     /90 (BP Location: Left arm, Patient Position: Sitting, Cuff Size: Adult)   Pulse 91   Temp 98 °F (36 7 °C) (Tympanic)   Resp 16   Ht 5' 4 96" (1 65 m)   Wt 73 5 kg (162 lb)   SpO2 99%   BMI 26 99 kg/m²     Physical Exam    Tai Reveal, DO

## 2019-12-17 NOTE — PROGRESS NOTES
[facility]      NAME: Warren Farrell  AGE: [de-identified] y o  SEX: female  : 1940   MRN: [id number]    DATE: 2019  TIME: 1:38 PM    Assessment and Plan     Problem List Items Addressed This Visit     Hyperlipidemia     Lipids stable  Continue atorvastatin  Recheck in   Relevant Medications    atorvastatin (LIPITOR) 20 mg tablet    Coronary artery disease involving native coronary artery of native heart without angina pectoris     Stable on current regimen  Keep follow-up with Cardiology  Relevant Medications    metoprolol succinate (TOPROL-XL) 50 mg 24 hr tablet    Benign essential hypertension     Well controlled on lisinopril and metoprolol  Relevant Medications    lisinopril (ZESTRIL) 10 mg tablet    metoprolol succinate (TOPROL-XL) 50 mg 24 hr tablet    Other Relevant Orders    Comprehensive metabolic panel    TSH, 3rd generation with Free T4 reflex    Asthma     Continue current medications for asthma through inhalers or nebulizer  Keep follow-up with pulmonary           Other Visit Diagnoses     Medicare annual wellness visit, subsequent    -  Primary    Questionnaire reviewed  Immunization and screening recommendations discussed  Advance directive in place  Gastroesophageal reflux disease without esophagitis        Relevant Medications    pantoprazole (PROTONIX) 40 mg tablet    Arthritis        Relevant Medications    celecoxib (CELEBREX) 200 mg capsule              Return to office in:  6 months    Chief Complaint     Chief Complaint   Patient presents with    Medicare Wellness Visit    Follow-up     6 months check up       History of Present Illness     Patient was seen for routine follow-up of chronic medical problems  She is being followed for hyperlipidemia with atorvastatin  She sees Pulmonary Medicine for her COPD/asthma  She uses DuoNeb inhaler and Pulmicort     She takes lisinopril and metoprolol for her blood pressure    She sees Cardiology as well for coronary artery disease  She remains on Plavix  The following portions of the patient's history were reviewed and updated as appropriate: allergies, current medications, past family history, past medical history, past social history, past surgical history and problem list     Review of Systems   Review of Systems   Constitutional: Negative  Respiratory: Negative  Cardiovascular: Negative  Gastrointestinal: Negative  Genitourinary: Negative  Musculoskeletal: Negative  Psychiatric/Behavioral: Negative  Active Problem List     Patient Active Problem List   Diagnosis    Abnormality of esophagus    Acute systolic [facility]congestive [facility] failure) [facility]    Malignant neoplasm of ovary (HCC)    Anxiety    Asthma    Benign essential hypertension    Chronic sinusitis    Coronary artery disease involving native coronary artery of native heart without angina pectoris    Environmental and seasonal allergies    Gastroesophageal reflux disease with esophagitis    Ground glass opacity present on imaging of lung    Hyperlipidemia    Hyperthyroidism    Lung nodule    Mild persistent asthma without complication    Multinodular non-toxic goiter    NSTEMI (non-ST elevated myocardial infarction) ([facility] [address] [facility]    Osteoarthritis    Osteopenia    Pancreatic cyst    Pulmonary nodule, right    Vitamin D deficiency    COPD with acute exacerbation ([facility] [address] )    Acute respiratory failure with hypoxia and hypercarbia ([facility])       Objective   /90 (BP Location: Left arm, Patient Position: Sitting, Cuff Size: Adult)   Pulse 91   Temp 98 °F (36 7 °C) (Tympanic)   Resp 16   Ht 5' 4 96" (1 65 m)   Wt 73 5 kg (162 lb)   SpO2 99%   BMI 26 99 kg/m²     Physical Exam   Constitutional: She is oriented to person, place, and time  She appears well-developed and well-nourished  No distress  HENT:   Head: Normocephalic and atraumatic     Eyes: Pupils are equal, round, and reactive to light  Conjunctivae are normal  Right eye exhibits no discharge  Neck: Normal range of motion  No thyromegaly present  Cardiovascular: Normal rate and regular rhythm  Pulmonary/Chest: Effort normal and breath sounds normal  No respiratory distress  Lymphadenopathy:     She has no cervical adenopathy  Neurological: She is alert and oriented to person, place, and time  Skin: Skin is warm and dry  She is not diaphoretic  Psychiatric: She has a normal mood and affect  Her behavior is normal  Judgment and thought content normal    Nursing note and vitals reviewed          Current Medications     Current Outpatient Medications:     albuterol (PROAIR HFA) 90 mcg/act inhaler, Inhale as needed , Disp: , Rfl:     aspirin (ADULT ASPIRIN EC LOW STRENGTH) 81 mg EC tablet, Take 1 tablet by mouth daily, Disp: , Rfl:     atorvastatin (LIPITOR) 20 mg tablet, Take 2 tablets (40 mg total) by mouth daily, Disp: 90 tablet, Rfl: 1    budesonide (PULMICORT) 0 5 mg/2 mL nebulizer solution, EMPTY ONE RESPULE INTO YOUR SINUS RINSE BOTTLE AND LAVAGE NOSTRILS BID, Disp: , Rfl: 3    celecoxib (CELEBREX) 200 mg capsule, Take 1 capsule (200 mg total) by mouth daily, Disp: 90 capsule, Rfl: 2    cetirizine (ZyrTEC) 10 mg tablet, TK 1 T PO NIGHTLY, Disp: , Rfl: 3    Cholecalciferol (CVS VIT D 5000 HIGH-POTENCY) 5000 units capsule, Take 2,000 Units by mouth daily  , Disp: , Rfl:     fluticasone (FLONASE) 50 mcg/act nasal spray, 2 sprays into each nostril daily, Disp: 3 Bottle, Rfl: 3    Glucosamine-Chondroitin 500-400 MG CAPS, Take 1 tablet by mouth daily, Disp: , Rfl:     ipratropium (ATROVENT) 0 02 % nebulizer solution, Inhale 1 each 4 (four) times a day as needed, Disp: , Rfl:     lisinopril (ZESTRIL) 10 mg tablet, Take 1 tablet (10 mg total) by mouth daily, Disp: 90 tablet, Rfl: 1    metoprolol succinate (TOPROL-XL) 50 mg 24 hr tablet, Take 1 tablet (50 mg total) by mouth daily, Disp: 90 tablet, Rfl: 1    multivitamin (THERAGRAN) TABS, Take 1 tablet by mouth daily, Disp: , Rfl:     pantoprazole (PROTONIX) 40 mg tablet, Take 1 tablet (40 mg total) by mouth daily, Disp: 90 tablet, Rfl: 1    ALPRAZolam (XANAX) 0 25 mg tablet, Take 1 tablet by mouth 2 (two) times a day as needed, Disp: , Rfl:     clopidogrel (PLAVIX) 75 mg tablet, Take 1 tablet (75 mg total) by mouth daily (Patient not taking: Reported on 12/17/2019), Disp: 90 tablet, Rfl: 1    mometasone-formoterol (DULERA) 100-5 MCG/ACT inhaler, Inhale 2 puffs as needed , Disp: , Rfl:     ondansetron (ZOFRAN) 4 mg tablet, Take 1 tablet (4 mg total) by mouth every 8 (eight) hours as needed for nausea or vomiting (Patient not taking: Reported on 5/15/2019), Disp: 20 tablet, Rfl: 0    Health Maintenance     Health Maintenance   Topic Date Due    Medicare Annual Wellness Visit (AWV)  12/12/2019    DTaP,Tdap,and Td Vaccines (1 - Tdap) 12/17/2020 (Originally 1/15/1951)    BMI: Followup Plan  06/17/2020    Fall Risk  12/17/2020    Depression Screening PHQ  12/17/2020    Urinary Incontinence Screening  12/17/2020    BMI: Adult  12/17/2020    Influenza Vaccine  Completed    Pneumococcal Vaccine: 65+ Years  Completed    Pneumococcal Vaccine: Pediatrics (0 to 5 Years) and At-Risk Patients (6 to 59 Years)  Aged Out    HIB Vaccine  Aged Out    Hepatitis B Vaccine  Aged Out    IPV Vaccine  Aged Out    Hepatitis A Vaccine  Aged Out    Meningococcal ACWY Vaccine  Aged Out    HPV Vaccine  Aged [employer] History   Administered Date(s) Administered    H1N1, All Formulations 01/11/2010    INFLUENZA 10/02/2013, 10/24/2014, 03/23/2015, 10/19/2015, 10/31/2016, 11/14/2016, 10/11/2017    Influenza Split High Dose Preservative Free IM 10/19/2014, 10/28/2015, 10/31/2016, 10/11/2017    Influenza TIV (IM) 10/26/2012, 10/16/2013    Influenza, high dose seasonal 0 5 mL 10/17/2018    Influenza, injectable, quadrivalent, preservative free 0 5 mL 10/30/2019    Pneumococcal Conjugate 13-Valent 05/21/2015    Pneumococcal Polysaccharide PPV23 11/09/2005, 03/16/2015    Zoster 01/01/2012    Zoster Vaccine Recombinant 02/06/2019, 06/26/2019       Emory Oliver DO  [facility]

## 2020-02-11 DIAGNOSIS — J32.9 CHRONIC SINUSITIS, UNSPECIFIED LOCATION: ICD-10-CM

## 2020-02-11 RX ORDER — FLUTICASONE PROPIONATE 50 MCG
SPRAY, SUSPENSION (ML) NASAL
Qty: 48 G | Refills: 3 | Status: SHIPPED | OUTPATIENT
Start: 2020-02-11 | End: 2020-12-21 | Stop reason: SDUPTHER

## 2020-06-16 DIAGNOSIS — I10 BENIGN ESSENTIAL HYPERTENSION: ICD-10-CM

## 2020-06-16 DIAGNOSIS — K21.9 GASTROESOPHAGEAL REFLUX DISEASE WITHOUT ESOPHAGITIS: ICD-10-CM

## 2020-06-16 DIAGNOSIS — E78.2 MIXED HYPERLIPIDEMIA: ICD-10-CM

## 2020-06-16 RX ORDER — METOPROLOL SUCCINATE 50 MG/1
50 TABLET, EXTENDED RELEASE ORAL DAILY
Qty: 90 TABLET | Refills: 1 | Status: SHIPPED | OUTPATIENT
Start: 2020-06-16 | End: 2020-12-14

## 2020-06-16 RX ORDER — PANTOPRAZOLE SODIUM 40 MG/1
40 TABLET, DELAYED RELEASE ORAL DAILY
Qty: 90 TABLET | Refills: 1 | Status: SHIPPED | OUTPATIENT
Start: 2020-06-16 | End: 2020-12-14

## 2020-06-16 RX ORDER — LISINOPRIL 10 MG/1
10 TABLET ORAL DAILY
Qty: 90 TABLET | Refills: 1 | Status: SHIPPED | OUTPATIENT
Start: 2020-06-16 | End: 2020-12-14

## 2020-06-16 RX ORDER — ATORVASTATIN CALCIUM 20 MG/1
40 TABLET, FILM COATED ORAL DAILY
Qty: 90 TABLET | Refills: 1 | Status: SHIPPED | OUTPATIENT
Start: 2020-06-16 | End: 2020-06-19 | Stop reason: SDUPTHER

## 2020-06-17 ENCOUNTER — OFFICE VISIT (OUTPATIENT)
Dept: FAMILY MEDICINE CLINIC | Facility: CLINIC | Age: 80
End: 2020-06-17
Payer: MEDICARE

## 2020-06-17 VITALS
TEMPERATURE: 98 F | HEART RATE: 73 BPM | DIASTOLIC BLOOD PRESSURE: 72 MMHG | SYSTOLIC BLOOD PRESSURE: 130 MMHG | WEIGHT: 155.8 LBS | HEIGHT: 65 IN | OXYGEN SATURATION: 98 % | BODY MASS INDEX: 25.96 KG/M2

## 2020-06-17 DIAGNOSIS — I10 BENIGN ESSENTIAL HYPERTENSION: ICD-10-CM

## 2020-06-17 DIAGNOSIS — M19.90 ARTHRITIS: ICD-10-CM

## 2020-06-17 DIAGNOSIS — J45.30 MILD PERSISTENT ASTHMA WITHOUT COMPLICATION: ICD-10-CM

## 2020-06-17 DIAGNOSIS — K21.00 GASTROESOPHAGEAL REFLUX DISEASE WITH ESOPHAGITIS: ICD-10-CM

## 2020-06-17 DIAGNOSIS — Z78.0 POST-MENOPAUSAL: Primary | ICD-10-CM

## 2020-06-17 PROBLEM — J96.02 ACUTE RESPIRATORY FAILURE WITH HYPOXIA AND HYPERCARBIA (HCC): Status: RESOLVED | Noted: 2019-06-17 | Resolved: 2020-06-17

## 2020-06-17 PROBLEM — J96.01 ACUTE RESPIRATORY FAILURE WITH HYPOXIA AND HYPERCARBIA (HCC): Status: RESOLVED | Noted: 2019-06-17 | Resolved: 2020-06-17

## 2020-06-17 PROCEDURE — 4040F PNEUMOC VAC/ADMIN/RCVD: CPT | Performed by: FAMILY MEDICINE

## 2020-06-17 PROCEDURE — 99214 OFFICE O/P EST MOD 30 MIN: CPT | Performed by: FAMILY MEDICINE

## 2020-06-17 PROCEDURE — 1160F RVW MEDS BY RX/DR IN RCRD: CPT | Performed by: FAMILY MEDICINE

## 2020-06-17 PROCEDURE — 3008F BODY MASS INDEX DOCD: CPT | Performed by: FAMILY MEDICINE

## 2020-06-17 PROCEDURE — 3075F SYST BP GE 130 - 139MM HG: CPT | Performed by: FAMILY MEDICINE

## 2020-06-17 PROCEDURE — 1036F TOBACCO NON-USER: CPT | Performed by: FAMILY MEDICINE

## 2020-06-17 PROCEDURE — 3078F DIAST BP <80 MM HG: CPT | Performed by: FAMILY MEDICINE

## 2020-06-17 RX ORDER — FLUTICASONE FUROATE AND VILANTEROL 200; 25 UG/1; UG/1
1 POWDER RESPIRATORY (INHALATION) DAILY
COMMUNITY
End: 2020-12-21 | Stop reason: SDUPTHER

## 2020-06-17 RX ORDER — CELECOXIB 200 MG/1
200 CAPSULE ORAL DAILY
Qty: 90 CAPSULE | Refills: 2 | Status: SHIPPED | OUTPATIENT
Start: 2020-06-17 | End: 2020-09-25 | Stop reason: SDUPTHER

## 2020-06-17 RX ORDER — FLUTICASONE FUROATE AND VILANTEROL 200; 25 UG/1; UG/1
1 POWDER RESPIRATORY (INHALATION) DAILY
COMMUNITY
Start: 2020-04-27 | End: 2020-06-17

## 2020-06-17 RX ORDER — CETIRIZINE HYDROCHLORIDE 10 MG/1
TABLET ORAL
COMMUNITY
End: 2020-06-17

## 2020-06-18 DIAGNOSIS — E78.2 MIXED HYPERLIPIDEMIA: ICD-10-CM

## 2020-06-18 LAB
ALBUMIN SERPL-MCNC: 4 G/DL (ref 3.6–5.1)
ALBUMIN/GLOB SERPL: 1.5 (CALC) (ref 1–2.5)
ALP SERPL-CCNC: 70 U/L (ref 37–153)
ALT SERPL-CCNC: 15 U/L (ref 6–29)
AST SERPL-CCNC: 23 U/L (ref 10–35)
BILIRUB SERPL-MCNC: 0.5 MG/DL (ref 0.2–1.2)
BUN SERPL-MCNC: 15 MG/DL (ref 7–25)
BUN/CREAT SERPL: NORMAL (CALC) (ref 6–22)
CALCIUM SERPL-MCNC: 9.2 MG/DL (ref 8.6–10.4)
CHLORIDE SERPL-SCNC: 105 MMOL/L (ref 98–110)
CO2 SERPL-SCNC: 27 MMOL/L (ref 20–32)
CREAT SERPL-MCNC: 0.71 MG/DL (ref 0.6–0.88)
GLOBULIN SER CALC-MCNC: 2.6 G/DL (CALC) (ref 1.9–3.7)
GLUCOSE SERPL-MCNC: 93 MG/DL (ref 65–99)
POTASSIUM SERPL-SCNC: 4.5 MMOL/L (ref 3.5–5.3)
PROT SERPL-MCNC: 6.6 G/DL (ref 6.1–8.1)
SL AMB EGFR AFRICAN AMERICAN: 93 ML/MIN/1.73M2
SL AMB EGFR NON AFRICAN AMERICAN: 80 ML/MIN/1.73M2
SODIUM SERPL-SCNC: 140 MMOL/L (ref 135–146)
TSH SERPL-ACNC: 1.21 MIU/L (ref 0.4–4.5)

## 2020-06-19 RX ORDER — ATORVASTATIN CALCIUM 20 MG/1
TABLET, FILM COATED ORAL
Qty: 180 TABLET | Refills: 1 | Status: SHIPPED | OUTPATIENT
Start: 2020-06-19 | End: 2020-12-21 | Stop reason: SDUPTHER

## 2020-06-23 ENCOUNTER — ANNUAL EXAM (OUTPATIENT)
Dept: OBGYN CLINIC | Facility: MEDICAL CENTER | Age: 80
End: 2020-06-23
Payer: MEDICARE

## 2020-06-23 VITALS — SYSTOLIC BLOOD PRESSURE: 130 MMHG | WEIGHT: 155.1 LBS | DIASTOLIC BLOOD PRESSURE: 70 MMHG | BODY MASS INDEX: 25.84 KG/M2

## 2020-06-23 DIAGNOSIS — Z92.89 PERSONAL HISTORY OF MEDICAL TREATMENT: ICD-10-CM

## 2020-06-23 DIAGNOSIS — Z12.31 ENCOUNTER FOR SCREENING MAMMOGRAM FOR MALIGNANT NEOPLASM OF BREAST: ICD-10-CM

## 2020-06-23 DIAGNOSIS — Z01.419 ENCOUNTER FOR GYNECOLOGICAL EXAMINATION WITH PAPANICOLAOU SMEAR OF CERVIX: Primary | ICD-10-CM

## 2020-06-23 DIAGNOSIS — C56.9 MALIGNANT NEOPLASM OF OVARY, UNSPECIFIED LATERALITY (HCC): ICD-10-CM

## 2020-06-23 PROCEDURE — G0101 CA SCREEN;PELVIC/BREAST EXAM: HCPCS | Performed by: OBSTETRICS & GYNECOLOGY

## 2020-06-23 PROCEDURE — G0145 SCR C/V CYTO,THINLAYER,RESCR: HCPCS | Performed by: OBSTETRICS & GYNECOLOGY

## 2020-06-26 LAB
LAB AP GYN PRIMARY INTERPRETATION: NORMAL
Lab: NORMAL

## 2020-09-25 DIAGNOSIS — M19.90 ARTHRITIS: ICD-10-CM

## 2020-09-25 RX ORDER — CELECOXIB 200 MG/1
200 CAPSULE ORAL DAILY
Qty: 90 CAPSULE | Refills: 2 | Status: SHIPPED | OUTPATIENT
Start: 2020-09-25 | End: 2020-12-21 | Stop reason: SDUPTHER

## 2020-12-08 ENCOUNTER — TELEPHONE (OUTPATIENT)
Dept: FAMILY MEDICINE CLINIC | Facility: CLINIC | Age: 80
End: 2020-12-08

## 2020-12-08 DIAGNOSIS — E78.2 MIXED HYPERLIPIDEMIA: Primary | ICD-10-CM

## 2020-12-13 DIAGNOSIS — I10 BENIGN ESSENTIAL HYPERTENSION: ICD-10-CM

## 2020-12-13 DIAGNOSIS — K21.9 GASTROESOPHAGEAL REFLUX DISEASE WITHOUT ESOPHAGITIS: ICD-10-CM

## 2020-12-14 RX ORDER — PANTOPRAZOLE SODIUM 40 MG/1
TABLET, DELAYED RELEASE ORAL
Qty: 90 TABLET | Refills: 1 | Status: SHIPPED | OUTPATIENT
Start: 2020-12-14 | End: 2020-12-21 | Stop reason: SDUPTHER

## 2020-12-14 RX ORDER — METOPROLOL SUCCINATE 50 MG/1
TABLET, EXTENDED RELEASE ORAL
Qty: 90 TABLET | Refills: 1 | Status: SHIPPED | OUTPATIENT
Start: 2020-12-14 | End: 2020-12-21 | Stop reason: SDUPTHER

## 2020-12-14 RX ORDER — LISINOPRIL 10 MG/1
TABLET ORAL
Qty: 90 TABLET | Refills: 1 | Status: SHIPPED | OUTPATIENT
Start: 2020-12-14 | End: 2020-12-21 | Stop reason: SDUPTHER

## 2020-12-16 LAB
ALBUMIN SERPL-MCNC: 4 G/DL (ref 3.6–5.1)
ALBUMIN/GLOB SERPL: 1.5 (CALC) (ref 1–2.5)
ALP SERPL-CCNC: 55 U/L (ref 37–153)
ALT SERPL-CCNC: 15 U/L (ref 6–29)
AST SERPL-CCNC: 23 U/L (ref 10–35)
BILIRUB SERPL-MCNC: 0.5 MG/DL (ref 0.2–1.2)
BUN SERPL-MCNC: 17 MG/DL (ref 7–25)
BUN/CREAT SERPL: NORMAL (CALC) (ref 6–22)
CALCIUM SERPL-MCNC: 9.1 MG/DL (ref 8.6–10.4)
CHLORIDE SERPL-SCNC: 106 MMOL/L (ref 98–110)
CHOLEST SERPL-MCNC: 178 MG/DL
CHOLEST/HDLC SERPL: 2.3 (CALC)
CO2 SERPL-SCNC: 29 MMOL/L (ref 20–32)
CREAT SERPL-MCNC: 0.69 MG/DL (ref 0.6–0.88)
GLOBULIN SER CALC-MCNC: 2.6 G/DL (CALC) (ref 1.9–3.7)
GLUCOSE SERPL-MCNC: 92 MG/DL (ref 65–99)
HDLC SERPL-MCNC: 76 MG/DL
LDLC SERPL CALC-MCNC: 86 MG/DL (CALC)
NONHDLC SERPL-MCNC: 102 MG/DL (CALC)
POTASSIUM SERPL-SCNC: 4.2 MMOL/L (ref 3.5–5.3)
PROT SERPL-MCNC: 6.6 G/DL (ref 6.1–8.1)
SL AMB EGFR AFRICAN AMERICAN: 95 ML/MIN/1.73M2
SL AMB EGFR NON AFRICAN AMERICAN: 82 ML/MIN/1.73M2
SODIUM SERPL-SCNC: 141 MMOL/L (ref 135–146)
TRIGL SERPL-MCNC: 71 MG/DL
TSH SERPL-ACNC: 1.47 MIU/L (ref 0.4–4.5)

## 2020-12-21 ENCOUNTER — OFFICE VISIT (OUTPATIENT)
Dept: FAMILY MEDICINE CLINIC | Facility: CLINIC | Age: 80
End: 2020-12-21
Payer: MEDICARE

## 2020-12-21 VITALS
HEART RATE: 78 BPM | DIASTOLIC BLOOD PRESSURE: 86 MMHG | HEIGHT: 66 IN | TEMPERATURE: 98.7 F | OXYGEN SATURATION: 98 % | WEIGHT: 166.2 LBS | SYSTOLIC BLOOD PRESSURE: 124 MMHG | BODY MASS INDEX: 26.71 KG/M2

## 2020-12-21 DIAGNOSIS — M19.90 ARTHRITIS: ICD-10-CM

## 2020-12-21 DIAGNOSIS — Z00.00 MEDICARE ANNUAL WELLNESS VISIT, SUBSEQUENT: Primary | ICD-10-CM

## 2020-12-21 DIAGNOSIS — E78.2 MIXED HYPERLIPIDEMIA: ICD-10-CM

## 2020-12-21 DIAGNOSIS — K21.9 GASTROESOPHAGEAL REFLUX DISEASE WITHOUT ESOPHAGITIS: ICD-10-CM

## 2020-12-21 DIAGNOSIS — I25.10 CORONARY ARTERY DISEASE INVOLVING NATIVE CORONARY ARTERY OF NATIVE HEART WITHOUT ANGINA PECTORIS: ICD-10-CM

## 2020-12-21 DIAGNOSIS — J45.30 MILD PERSISTENT ASTHMA WITHOUT COMPLICATION: ICD-10-CM

## 2020-12-21 DIAGNOSIS — I10 BENIGN ESSENTIAL HYPERTENSION: ICD-10-CM

## 2020-12-21 DIAGNOSIS — J32.9 CHRONIC SINUSITIS, UNSPECIFIED LOCATION: ICD-10-CM

## 2020-12-21 PROCEDURE — 99214 OFFICE O/P EST MOD 30 MIN: CPT | Performed by: FAMILY MEDICINE

## 2020-12-21 PROCEDURE — 1123F ACP DISCUSS/DSCN MKR DOCD: CPT | Performed by: FAMILY MEDICINE

## 2020-12-21 PROCEDURE — G0439 PPPS, SUBSEQ VISIT: HCPCS | Performed by: FAMILY MEDICINE

## 2020-12-21 RX ORDER — METOPROLOL SUCCINATE 50 MG/1
50 TABLET, EXTENDED RELEASE ORAL DAILY
Qty: 90 TABLET | Refills: 1 | Status: SHIPPED | OUTPATIENT
Start: 2020-12-21 | End: 2021-06-21 | Stop reason: SDUPTHER

## 2020-12-21 RX ORDER — LISINOPRIL 10 MG/1
10 TABLET ORAL DAILY
Qty: 90 TABLET | Refills: 1 | Status: SHIPPED | OUTPATIENT
Start: 2020-12-21 | End: 2021-06-21 | Stop reason: SDUPTHER

## 2020-12-21 RX ORDER — CELECOXIB 200 MG/1
200 CAPSULE ORAL DAILY
Qty: 90 CAPSULE | Refills: 2 | Status: SHIPPED | OUTPATIENT
Start: 2020-12-21 | End: 2021-12-27 | Stop reason: SDUPTHER

## 2020-12-21 RX ORDER — PANTOPRAZOLE SODIUM 40 MG/1
40 TABLET, DELAYED RELEASE ORAL DAILY
Qty: 90 TABLET | Refills: 1 | Status: SHIPPED | OUTPATIENT
Start: 2020-12-21 | End: 2021-06-21 | Stop reason: SDUPTHER

## 2020-12-21 RX ORDER — ATORVASTATIN CALCIUM 20 MG/1
40 TABLET, FILM COATED ORAL DAILY
Qty: 180 TABLET | Refills: 1 | Status: SHIPPED | OUTPATIENT
Start: 2020-12-21 | End: 2021-06-21 | Stop reason: SDUPTHER

## 2020-12-21 RX ORDER — FLUTICASONE PROPIONATE 50 MCG
2 SPRAY, SUSPENSION (ML) NASAL DAILY
Qty: 48 G | Refills: 3 | Status: SHIPPED | OUTPATIENT
Start: 2020-12-21 | End: 2022-07-06 | Stop reason: SDUPTHER

## 2021-01-20 DIAGNOSIS — Z23 ENCOUNTER FOR IMMUNIZATION: ICD-10-CM

## 2021-03-08 ENCOUNTER — TELEPHONE (OUTPATIENT)
Dept: FAMILY MEDICINE CLINIC | Facility: CLINIC | Age: 81
End: 2021-03-08

## 2021-04-12 ENCOUNTER — HOSPITAL ENCOUNTER (OUTPATIENT)
Dept: MAMMOGRAPHY | Facility: MEDICAL CENTER | Age: 81
Discharge: HOME | End: 2021-04-12
Payer: MEDICARE

## 2021-04-12 VITALS — WEIGHT: 166 LBS | BODY MASS INDEX: 26.68 KG/M2 | HEIGHT: 66 IN

## 2021-04-12 DIAGNOSIS — Z12.31 ENCOUNTER FOR SCREENING MAMMOGRAM FOR MALIGNANT NEOPLASM OF BREAST: ICD-10-CM

## 2021-04-12 PROCEDURE — 77067 SCR MAMMO BI INCL CAD: CPT

## 2021-04-12 PROCEDURE — 77063 BREAST TOMOSYNTHESIS BI: CPT

## 2021-06-15 LAB
ALBUMIN SERPL-MCNC: 3.9 G/DL (ref 3.6–5.1)
ALBUMIN/GLOB SERPL: 1.5 (CALC) (ref 1–2.5)
ALP SERPL-CCNC: 66 U/L (ref 37–153)
ALT SERPL-CCNC: 14 U/L (ref 6–29)
AST SERPL-CCNC: 23 U/L (ref 10–35)
BILIRUB SERPL-MCNC: 0.4 MG/DL (ref 0.2–1.2)
BUN SERPL-MCNC: 19 MG/DL (ref 7–25)
BUN/CREAT SERPL: NORMAL (CALC) (ref 6–22)
CALCIUM SERPL-MCNC: 9.3 MG/DL (ref 8.6–10.4)
CHLORIDE SERPL-SCNC: 105 MMOL/L (ref 98–110)
CO2 SERPL-SCNC: 29 MMOL/L (ref 20–32)
CREAT SERPL-MCNC: 0.77 MG/DL (ref 0.6–0.88)
GLOBULIN SER CALC-MCNC: 2.6 G/DL (CALC) (ref 1.9–3.7)
GLUCOSE SERPL-MCNC: 99 MG/DL (ref 65–99)
POTASSIUM SERPL-SCNC: 4.7 MMOL/L (ref 3.5–5.3)
PROT SERPL-MCNC: 6.5 G/DL (ref 6.1–8.1)
SL AMB EGFR AFRICAN AMERICAN: 83 ML/MIN/1.73M2
SL AMB EGFR NON AFRICAN AMERICAN: 72 ML/MIN/1.73M2
SODIUM SERPL-SCNC: 140 MMOL/L (ref 135–146)
TSH SERPL-ACNC: 1.91 MIU/L (ref 0.4–4.5)

## 2021-06-21 ENCOUNTER — OFFICE VISIT (OUTPATIENT)
Dept: FAMILY MEDICINE CLINIC | Facility: CLINIC | Age: 81
End: 2021-06-21
Payer: MEDICARE

## 2021-06-21 VITALS
BODY MASS INDEX: 27.63 KG/M2 | HEIGHT: 65 IN | TEMPERATURE: 98.6 F | DIASTOLIC BLOOD PRESSURE: 86 MMHG | HEART RATE: 91 BPM | WEIGHT: 165.8 LBS | OXYGEN SATURATION: 97 % | SYSTOLIC BLOOD PRESSURE: 158 MMHG

## 2021-06-21 DIAGNOSIS — K21.9 GASTROESOPHAGEAL REFLUX DISEASE WITHOUT ESOPHAGITIS: ICD-10-CM

## 2021-06-21 DIAGNOSIS — Z78.0 POST-MENOPAUSAL: Primary | ICD-10-CM

## 2021-06-21 DIAGNOSIS — I10 BENIGN ESSENTIAL HYPERTENSION: ICD-10-CM

## 2021-06-21 DIAGNOSIS — E78.2 MIXED HYPERLIPIDEMIA: ICD-10-CM

## 2021-06-21 PROCEDURE — 99214 OFFICE O/P EST MOD 30 MIN: CPT | Performed by: FAMILY MEDICINE

## 2021-06-21 RX ORDER — METOPROLOL SUCCINATE 50 MG/1
50 TABLET, EXTENDED RELEASE ORAL DAILY
Qty: 90 TABLET | Refills: 1 | Status: SHIPPED | OUTPATIENT
Start: 2021-06-21 | End: 2021-12-27 | Stop reason: SDUPTHER

## 2021-06-21 RX ORDER — LISINOPRIL 10 MG/1
10 TABLET ORAL DAILY
Qty: 90 TABLET | Refills: 1 | Status: SHIPPED | OUTPATIENT
Start: 2021-06-21 | End: 2021-12-27 | Stop reason: SDUPTHER

## 2021-06-21 RX ORDER — ATORVASTATIN CALCIUM 20 MG/1
40 TABLET, FILM COATED ORAL DAILY
Qty: 180 TABLET | Refills: 1 | Status: SHIPPED | OUTPATIENT
Start: 2021-06-21 | End: 2021-11-22

## 2021-06-21 RX ORDER — PANTOPRAZOLE SODIUM 40 MG/1
40 TABLET, DELAYED RELEASE ORAL DAILY
Qty: 90 TABLET | Refills: 1 | Status: SHIPPED | OUTPATIENT
Start: 2021-06-21 | End: 2021-12-27 | Stop reason: SDUPTHER

## 2021-06-21 NOTE — PROGRESS NOTES
[facility]      NAME: Sanchez Aguilera  AGE: 79 y o  SEX: female  : 1940   MRN: [id number]    DATE: 2021  TIME: 3:08 PM    Assessment and Plan     Problem List Items Addressed This Visit     Hyperlipidemia     Recheck annually  Continue atorvastatin  Next lipids in December         Relevant Medications    atorvastatin (LIPITOR) 20 mg tablet    Other Relevant Orders    Comprehensive metabolic panel    Lipid Panel with Direct LDL reflex    TSH, 3rd generation    Benign essential hypertension     Well controlled on lisinopril and metoprolol  Relevant Medications    metoprolol succinate (TOPROL-XL) 50 mg 24 hr tablet    lisinopril (ZESTRIL) 10 mg tablet      Other Visit Diagnoses     Post-menopausal    -  Primary    Relevant Orders    DXA bone density spine hip and pelvis    Gastroesophageal reflux disease without esophagitis        Relevant Medications    pantoprazole (PROTONIX) 40 mg tablet              Return to office in:  6 months, annual wellness visit    Chief Complaint     Chief Complaint   Patient presents with    Follow-up     6 month follow up HLD, HTN, CAD  Labs done 6/15/21    Tinnitus     buzzing in left ear, would like ears examined       History of Present Illness     Patient was seen for routine follow-up chronic medical problems  She is being treated for hypertension with lisinopril and metoprolol  She has asthma for which she sees her allergist   She uses Breo inhaler, albuterol Pulmicort and Atrovent  She takes atorvastatin for her lipids  She takes pantoprazole for reflux  The following portions of the patient's history were reviewed and updated as appropriate: allergies, current medications, past family history, past medical history, past social history, past surgical history and problem list     Review of Systems   Review of Systems   Constitutional: Negative  Respiratory: Negative  Cardiovascular: Negative  Gastrointestinal: Negative  Genitourinary: Negative  Musculoskeletal: Negative  Psychiatric/Behavioral: Negative  Active Problem List     Patient Active Problem List   Diagnosis    Abnormality of esophagus    Acute systolic CHF (congestive heart failure) (HCC)    Malignant neoplasm of ovary (HCC)    Anxiety    Asthma    Benign essential hypertension    Chronic sinusitis    Coronary artery disease involving native coronary artery of native heart without angina pectoris    Environmental and seasonal allergies    Gastroesophageal reflux disease with esophagitis    Ground glass opacity present on imaging of lung    Hyperlipidemia    Hyperthyroidism    Lung nodule    Mild persistent asthma without complication    Multinodular non-toxic goiter    NSTEMI (non-ST elevated myocardial infarction) ([facility] [address] )    Osteoarthritis    Osteopenia    Pancreatic cyst    Pulmonary nodule, right    Vitamin D deficiency    Personal history of medical treatment       Objective   /86 (BP Location: Right arm, Patient Position: Sitting, Cuff Size: Adult)   Pulse 91   Temp 98 6 °F (37 °C)   Ht 5' 5" (1 651 m)   Wt 75 2 kg (165 lb 12 8 oz)   SpO2 97%   BMI 27 59 kg/m²     Physical Exam  Vitals and nursing note reviewed  Constitutional:       General: She is not in acute distress  Appearance: She is well-developed  She is not diaphoretic  HENT:      Head: Normocephalic and atraumatic  Eyes:      General:         Right eye: No discharge  Conjunctiva/sclera: Conjunctivae normal       Pupils: Pupils are equal, round, and reactive to light  Neck:      Thyroid: No thyromegaly  Cardiovascular:      Rate and Rhythm: Normal rate and regular rhythm  Pulmonary:      Effort: Pulmonary effort is normal  No respiratory distress  Breath sounds: Normal breath sounds  Musculoskeletal:      Cervical back: Normal range of motion  Lymphadenopathy:      Cervical: No cervical adenopathy     Skin:     General: Skin is warm and dry  Neurological:      Mental Status: She is alert and oriented to person, place, and time  Psychiatric:         Behavior: Behavior normal          Thought Content:  Thought content normal          Judgment: Judgment normal            Current Medications     Current Outpatient Medications:     aspirin (ADULT ASPIRIN EC LOW STRENGTH) 81 mg EC tablet, Take 1 tablet by mouth daily, Disp: , Rfl:     atorvastatin (LIPITOR) 20 mg tablet, Take 2 tablets (40 mg total) by mouth daily, Disp: 180 tablet, Rfl: 1    celecoxib (CeleBREX) 200 mg capsule, Take 1 capsule (200 mg total) by mouth daily, Disp: 90 capsule, Rfl: 2    cetirizine (ZyrTEC) 10 mg tablet, TK 1 T PO NIGHTLY, Disp: , Rfl: 3    Cholecalciferol (CVS VIT D 5000 HIGH-POTENCY) 5000 units capsule, Take 2,000 Units by mouth daily  , Disp: , Rfl:     fluticasone (FLONASE) 50 mcg/act nasal spray, 2 sprays into each nostril daily Shake liquid and spray, Disp: 48 g, Rfl: 3    fluticasone-vilanterol (Breo Ellipta) 200-25 MCG/INH inhaler, Inhale 1 puff daily Rinse mouth after use , Disp: 3 Inhaler, Rfl: 1    Glucosamine-Chondroitin 500-400 MG CAPS, Take 1 tablet by mouth daily, Disp: , Rfl:     lisinopril (ZESTRIL) 10 mg tablet, Take 1 tablet (10 mg total) by mouth daily, Disp: 90 tablet, Rfl: 1    metoprolol succinate (TOPROL-XL) 50 mg 24 hr tablet, Take 1 tablet (50 mg total) by mouth daily, Disp: 90 tablet, Rfl: 1    multivitamin (THERAGRAN) TABS, Take 1 tablet by mouth daily, Disp: , Rfl:     pantoprazole (PROTONIX) 40 mg tablet, Take 1 tablet (40 mg total) by mouth daily, Disp: 90 tablet, Rfl: 1    albuterol (PROAIR HFA) 90 mcg/act inhaler, Inhale as needed  (Patient not taking: Reported on 6/21/2021), Disp: , Rfl:     ALPRAZolam (XANAX) 0 25 mg tablet, Take 1 tablet by mouth 2 (two) times a day as needed (Patient not taking: Reported on 6/21/2021), Disp: , Rfl:     budesonide (PULMICORT) 0 5 mg/2 mL nebulizer solution, EMPTY ONE RESPULE INTO YOUR SINUS RINSE BOTTLE AND LAVAGE NOSTRILS BID (Patient not taking: Reported on 6/21/2021), Disp: , Rfl: 3    ipratropium (ATROVENT) 0 02 % nebulizer solution, Inhale 1 each 4 (four) times a day as needed (Patient not taking: Reported on 6/21/2021), Disp: , Rfl:     ondansetron (ZOFRAN) 4 mg tablet, Take 1 tablet (4 mg total) by mouth every 8 (eight) hours as needed for nausea or vomiting (Patient not taking: Reported on 6/23/2020), Disp: 20 tablet, Rfl: 0    Health Maintenance     Health Maintenance   Topic Date Due    COVID-19 Vaccine (1) Never done    DTaP,Tdap,and Td Vaccines (1 - Tdap) Never done    DXA SCAN  05/16/2020    BMI: Followup Plan  12/21/2021    Fall Risk  12/21/2021    Medicare Annual Wellness Visit (AWV)  12/21/2021    Depression Screening PHQ  06/21/2022    BMI: Adult  06/21/2022    Pneumococcal Vaccine: 65+ Years  Completed    Influenza Vaccine  Completed    HIB Vaccine  Aged Out    Hepatitis B Vaccine  Aged Out    IPV Vaccine  Aged Out    Hepatitis A Vaccine  Aged Out    Meningococcal ACWY Vaccine  Aged Out    HPV Vaccine  Aged Out     Immunization History   Administered Date(s) Administered    H1N1, All Formulations 01/11/2010    INFLUENZA 10/02/2013, 10/24/2014, 03/23/2015, 10/19/2015, 10/31/2016, 11/14/2016, 10/11/2017    Influenza Split High Dose Preservative Free IM 10/19/2014, 10/28/2015, 10/31/2016, 10/11/2017, 09/21/2020    Influenza, high dose seasonal 0 7 mL 10/17/2018    Influenza, injectable, quadrivalent, preservative free 0 5 mL 10/30/2019    Influenza, seasonal, injectable 10/26/2012, 10/16/2013    Pneumococcal Conjugate 13-Valent 05/21/2015    Pneumococcal Polysaccharide PPV23 11/09/2005, 03/16/2015    Zoster 01/01/2012    Zoster Vaccine Recombinant 02/06/2019, 06/26/2019       Chen Catherine DO  [facility]

## 2021-07-23 DIAGNOSIS — J32.9 CHRONIC SINUSITIS, UNSPECIFIED LOCATION: ICD-10-CM

## 2021-10-12 ENCOUNTER — HOSPITAL ENCOUNTER (OUTPATIENT)
Dept: BONE DENSITY | Facility: MEDICAL CENTER | Age: 81
Discharge: HOME | End: 2021-10-12
Payer: MEDICARE

## 2021-10-12 DIAGNOSIS — Z78.0 POST-MENOPAUSAL: ICD-10-CM

## 2021-10-12 PROCEDURE — 77080 DXA BONE DENSITY AXIAL: CPT

## 2021-10-15 ENCOUNTER — TELEPHONE (OUTPATIENT)
Dept: FAMILY MEDICINE CLINIC | Facility: CLINIC | Age: 81
End: 2021-10-15

## 2021-11-18 DIAGNOSIS — J32.9 CHRONIC SINUSITIS, UNSPECIFIED LOCATION: ICD-10-CM

## 2021-11-22 DIAGNOSIS — E78.2 MIXED HYPERLIPIDEMIA: ICD-10-CM

## 2021-11-22 RX ORDER — ATORVASTATIN CALCIUM 20 MG/1
TABLET, FILM COATED ORAL
Qty: 180 TABLET | Refills: 1 | Status: SHIPPED | OUTPATIENT
Start: 2021-11-22 | End: 2021-12-27 | Stop reason: SDUPTHER

## 2021-12-23 LAB
ALBUMIN SERPL-MCNC: 4.1 G/DL (ref 3.6–5.1)
ALBUMIN/GLOB SERPL: 1.6 (CALC) (ref 1–2.5)
ALP SERPL-CCNC: 59 U/L (ref 37–153)
ALT SERPL-CCNC: 13 U/L (ref 6–29)
AST SERPL-CCNC: 21 U/L (ref 10–35)
BILIRUB SERPL-MCNC: 0.5 MG/DL (ref 0.2–1.2)
BUN SERPL-MCNC: 16 MG/DL (ref 7–25)
BUN/CREAT SERPL: NORMAL (CALC) (ref 6–22)
CALCIUM SERPL-MCNC: 9.2 MG/DL (ref 8.6–10.4)
CHLORIDE SERPL-SCNC: 104 MMOL/L (ref 98–110)
CHOLEST SERPL-MCNC: 174 MG/DL
CHOLEST/HDLC SERPL: 2.2 (CALC)
CO2 SERPL-SCNC: 31 MMOL/L (ref 20–32)
CREAT SERPL-MCNC: 0.68 MG/DL (ref 0.6–0.88)
GLOBULIN SER CALC-MCNC: 2.5 G/DL (CALC) (ref 1.9–3.7)
GLUCOSE SERPL-MCNC: 88 MG/DL (ref 65–99)
HDLC SERPL-MCNC: 80 MG/DL
LDLC SERPL CALC-MCNC: 80 MG/DL (CALC)
NONHDLC SERPL-MCNC: 94 MG/DL (CALC)
POTASSIUM SERPL-SCNC: 4.2 MMOL/L (ref 3.5–5.3)
PROT SERPL-MCNC: 6.6 G/DL (ref 6.1–8.1)
SL AMB EGFR AFRICAN AMERICAN: 94 ML/MIN/1.73M2
SL AMB EGFR NON AFRICAN AMERICAN: 81 ML/MIN/1.73M2
SODIUM SERPL-SCNC: 141 MMOL/L (ref 135–146)
TRIGL SERPL-MCNC: 67 MG/DL
TSH SERPL-ACNC: 1.12 MIU/L (ref 0.4–4.5)

## 2021-12-27 ENCOUNTER — OFFICE VISIT (OUTPATIENT)
Dept: FAMILY MEDICINE CLINIC | Facility: CLINIC | Age: 81
End: 2021-12-27
Payer: MEDICARE

## 2021-12-27 VITALS
DIASTOLIC BLOOD PRESSURE: 80 MMHG | TEMPERATURE: 97.8 F | HEART RATE: 66 BPM | BODY MASS INDEX: 28.16 KG/M2 | HEIGHT: 65 IN | WEIGHT: 169 LBS | OXYGEN SATURATION: 99 % | SYSTOLIC BLOOD PRESSURE: 140 MMHG

## 2021-12-27 DIAGNOSIS — F41.9 ANXIETY: ICD-10-CM

## 2021-12-27 DIAGNOSIS — Z00.00 MEDICARE ANNUAL WELLNESS VISIT, SUBSEQUENT: Primary | ICD-10-CM

## 2021-12-27 DIAGNOSIS — M19.90 ARTHRITIS: ICD-10-CM

## 2021-12-27 DIAGNOSIS — E78.2 MIXED HYPERLIPIDEMIA: ICD-10-CM

## 2021-12-27 DIAGNOSIS — J45.30 MILD PERSISTENT ASTHMA WITHOUT COMPLICATION: ICD-10-CM

## 2021-12-27 DIAGNOSIS — I25.10 CORONARY ARTERY DISEASE INVOLVING NATIVE CORONARY ARTERY OF NATIVE HEART WITHOUT ANGINA PECTORIS: ICD-10-CM

## 2021-12-27 DIAGNOSIS — K21.9 GASTROESOPHAGEAL REFLUX DISEASE WITHOUT ESOPHAGITIS: ICD-10-CM

## 2021-12-27 DIAGNOSIS — I10 BENIGN ESSENTIAL HYPERTENSION: ICD-10-CM

## 2021-12-27 PROBLEM — M81.6 LOCALIZED OSTEOPOROSIS WITHOUT CURRENT PATHOLOGICAL FRACTURE: Status: ACTIVE | Noted: 2021-12-27

## 2021-12-27 PROCEDURE — G0439 PPPS, SUBSEQ VISIT: HCPCS | Performed by: FAMILY MEDICINE

## 2021-12-27 PROCEDURE — 99214 OFFICE O/P EST MOD 30 MIN: CPT | Performed by: FAMILY MEDICINE

## 2021-12-27 RX ORDER — METOPROLOL SUCCINATE 50 MG/1
50 TABLET, EXTENDED RELEASE ORAL DAILY
Qty: 90 TABLET | Refills: 1 | Status: SHIPPED | OUTPATIENT
Start: 2021-12-27 | End: 2022-07-19

## 2021-12-27 RX ORDER — PANTOPRAZOLE SODIUM 40 MG/1
40 TABLET, DELAYED RELEASE ORAL DAILY
Qty: 90 TABLET | Refills: 1 | Status: SHIPPED | OUTPATIENT
Start: 2021-12-27 | End: 2022-07-19

## 2021-12-27 RX ORDER — ALBUTEROL SULFATE 90 UG/1
2 AEROSOL, METERED RESPIRATORY (INHALATION) EVERY 6 HOURS PRN
Qty: 18 G | Refills: 1 | Status: SHIPPED | OUTPATIENT
Start: 2021-12-27 | End: 2021-12-27

## 2021-12-27 RX ORDER — LISINOPRIL 10 MG/1
10 TABLET ORAL DAILY
Qty: 90 TABLET | Refills: 1 | Status: SHIPPED | OUTPATIENT
Start: 2021-12-27 | End: 2022-07-06 | Stop reason: SDUPTHER

## 2021-12-27 RX ORDER — ATORVASTATIN CALCIUM 20 MG/1
40 TABLET, FILM COATED ORAL DAILY
Qty: 180 TABLET | Refills: 1 | Status: SHIPPED | OUTPATIENT
Start: 2021-12-27 | End: 2022-07-06 | Stop reason: SDUPTHER

## 2021-12-27 RX ORDER — ALBUTEROL SULFATE 90 UG/1
AEROSOL, METERED RESPIRATORY (INHALATION)
Qty: 25.5 G | Refills: 1 | Status: SHIPPED | OUTPATIENT
Start: 2021-12-27

## 2021-12-27 RX ORDER — CELECOXIB 200 MG/1
200 CAPSULE ORAL DAILY
Qty: 90 CAPSULE | Refills: 2 | Status: SHIPPED | OUTPATIENT
Start: 2021-12-27 | End: 2022-07-06 | Stop reason: SDUPTHER

## 2022-01-31 DIAGNOSIS — J32.9 CHRONIC SINUSITIS, UNSPECIFIED LOCATION: ICD-10-CM

## 2022-05-01 DIAGNOSIS — J32.9 CHRONIC SINUSITIS, UNSPECIFIED LOCATION: ICD-10-CM

## 2022-05-03 ENCOUNTER — RA CDI HCC (OUTPATIENT)
Dept: OTHER | Facility: HOSPITAL | Age: 82
End: 2022-05-03

## 2022-05-03 NOTE — PROGRESS NOTES
[address]  coding opportunities       Chart reviewed, no opportunity found: CHART REVIEWED, NO OPPORTUNITY FOUND        Patients Insurance     Medicare Insurance: Medicare

## 2022-05-09 ENCOUNTER — PROCEDURE VISIT (OUTPATIENT)
Dept: FAMILY MEDICINE CLINIC | Facility: CLINIC | Age: 82
End: 2022-05-09
Payer: MEDICARE

## 2022-05-09 VITALS
OXYGEN SATURATION: 99 % | BODY MASS INDEX: 28.06 KG/M2 | WEIGHT: 168.4 LBS | SYSTOLIC BLOOD PRESSURE: 138 MMHG | DIASTOLIC BLOOD PRESSURE: 80 MMHG | TEMPERATURE: 98 F | HEART RATE: 80 BPM | HEIGHT: 65 IN

## 2022-05-09 DIAGNOSIS — J01.91 ACUTE RECURRENT SINUSITIS, UNSPECIFIED LOCATION: ICD-10-CM

## 2022-05-09 DIAGNOSIS — M81.0 OSTEOPOROSIS WITHOUT CURRENT PATHOLOGICAL FRACTURE, UNSPECIFIED OSTEOPOROSIS TYPE: Primary | ICD-10-CM

## 2022-05-09 PROCEDURE — 99214 OFFICE O/P EST MOD 30 MIN: CPT | Performed by: FAMILY MEDICINE

## 2022-05-09 RX ORDER — CEFUROXIME AXETIL 250 MG/1
250 TABLET ORAL EVERY 12 HOURS SCHEDULED
Qty: 20 TABLET | Refills: 0 | Status: SHIPPED | OUTPATIENT
Start: 2022-05-09 | End: 2022-05-19

## 2022-05-09 RX ORDER — PREDNISONE 10 MG/1
TABLET ORAL
Qty: 30 TABLET | Refills: 0 | Status: SHIPPED | OUTPATIENT
Start: 2022-05-09 | End: 2022-07-06 | Stop reason: ALTCHOICE

## 2022-05-09 NOTE — PROGRESS NOTES
[facility]      NAME: Lucia Cornell  AGE: 79 y o  SEX: female  : 1940   MRN: [id number]    DATE: 2022  TIME: 6:18 PM    Assessment and Plan     Problem List Items Addressed This Visit     None      Visit Diagnoses     Osteoporosis without current pathological fracture, unspecified osteoporosis type    -  Primary    Relevant Medications    denosumab (PROLIA) 60 mg/mL    Acute recurrent sinusitis, unspecified location        Relevant Medications    cefuroxime (CEFTIN) 250 mg tablet    predniSONE 10 mg tablet        Will have patient obtain Prolia from pharmacy and bring with her to her  appointment prior to which she will have blood work done  Treat sinus issues with antibiotic and prednisone taper  Follow-up with ENT to evaluate for structural abnormality  Discussed referral to allergist for skin testing and possible immunotherapy  Return to office in:  P r n  Chief Complaint   No chief complaint on file  History of Present Illness     Patient presents to discuss her osteoporosis and sinus symptoms  Her recent DEXA scan did show evidence of osteoporosis and she wishes to start Prolia  She also has a history of chronic sinus problems with congestion postnasal drip  She sees Pulmonary Medicine for her asthma and respiratory problems  She does use Flonase nasal spray oral antihistamines and allergy eyedrops regularly in addition to her inhaler  She has an appointment with ENT within the next 2 weeks  She spends half of her year in [place] and has seen an ENT doctor there as well  The following portions of the patient's history were reviewed and updated as appropriate: allergies, current medications, past family history, past medical history, past social history, past surgical history and problem list     Review of Systems   Review of Systems   Constitutional: Negative  HENT: Positive for congestion, postnasal drip and sinus pressure  Respiratory: Negative  Cardiovascular: Negative  Gastrointestinal: Negative  Genitourinary: Negative  Musculoskeletal: Negative  Psychiatric/Behavioral: Negative  Active Problem List     Patient Active Problem List   Diagnosis    Abnormality of esophagus    Acute systolic CHF (congestive heart failure) (HCC)    Malignant neoplasm of ovary (HCC)    Anxiety    Asthma    Benign essential hypertension    Chronic sinusitis    Coronary artery disease involving native coronary artery of native heart without angina pectoris    Environmental and seasonal allergies    Gastroesophageal reflux disease with esophagitis    Ground glass opacity present on imaging of lung    Hyperlipidemia    Hyperthyroidism    Lung nodule    Mild persistent asthma without complication    Multinodular non-toxic goiter    NSTEMI (non-ST elevated myocardial infarction) ([facility] [address] )    Osteoarthritis    Osteopenia    Pancreatic cyst    Pulmonary nodule, right    Vitamin D deficiency    Personal history of medical treatment    Localized osteoporosis without current pathological fracture       Objective   /80 (BP Location: Left arm, Patient Position: Sitting, Cuff Size: Standard)   Pulse 80   Temp 98 °F (36 7 °C) (Tympanic)   Ht 5' 5" (1 651 m)   Wt 76 4 kg (168 lb 6 4 oz)   SpO2 99%   BMI 28 02 kg/m²     Physical Exam  Vitals and nursing note reviewed  Constitutional:       General: She is not in acute distress  Appearance: She is well-developed  She is not diaphoretic  HENT:      Head: Normocephalic and atraumatic  Eyes:      General:         Right eye: No discharge  Conjunctiva/sclera: Conjunctivae normal       Pupils: Pupils are equal, round, and reactive to light  Neck:      Thyroid: No thyromegaly  Cardiovascular:      Rate and Rhythm: Normal rate and regular rhythm  Pulmonary:      Effort: Pulmonary effort is normal  No respiratory distress        Breath sounds: Normal breath sounds  Musculoskeletal:      Cervical back: Normal range of motion  Lymphadenopathy:      Cervical: No cervical adenopathy  Neurological:      Mental Status: She is alert  Current Medications     Current Outpatient Medications:     albuterol (PROVENTIL HFA,VENTOLIN HFA) 90 mcg/act inhaler, INHALE 2 PUFFS EVERY 6 HOURS AS NEEDED FOR WHEEZING OR SHORTNESS OF BREATH , Disp: 25 5 g, Rfl: 1    aspirin (ADULT ASPIRIN EC LOW STRENGTH) 81 mg EC tablet, Take 1 tablet by mouth daily, Disp: , Rfl:     atorvastatin (LIPITOR) 20 mg tablet, Take 2 tablets (40 mg total) by mouth daily, Disp: 180 tablet, Rfl: 1    Breo Ellipta 200-25 MCG/INH inhaler, INHALE 1 PUFF BY MOUTH DAILY   RINSE MOUTH AFTER USE, Disp: 180 each, Rfl: 0    celecoxib (CeleBREX) 200 mg capsule, Take 1 capsule (200 mg total) by mouth daily, Disp: 90 capsule, Rfl: 2    cetirizine (ZyrTEC) 10 mg tablet, TK 1 T PO NIGHTLY, Disp: , Rfl: 3    Cholecalciferol ([employer] VIT D 5000 HIGH-POTENCY) 5000 units capsule, Take 2,000 Units by mouth daily  , Disp: , Rfl:     fluticasone (FLONASE) 50 mcg/act nasal spray, 2 sprays into each nostril daily Shake liquid and spray, Disp: 48 g, Rfl: 3    ipratropium (ATROVENT) 0 02 % nebulizer solution, Inhale 1 each 4 (four) times a day as needed  , Disp: , Rfl:     lisinopril (ZESTRIL) 10 mg tablet, Take 1 tablet (10 mg total) by mouth daily, Disp: 90 tablet, Rfl: 1    metoprolol succinate (TOPROL-XL) 50 mg 24 hr tablet, Take 1 tablet (50 mg total) by mouth daily, Disp: 90 tablet, Rfl: 1    multivitamin (THERAGRAN) TABS, Take 1 tablet by mouth daily, Disp: , Rfl:     pantoprazole (PROTONIX) 40 mg tablet, Take 1 tablet (40 mg total) by mouth daily, Disp: 90 tablet, Rfl: 1    ALPRAZolam (XANAX) 0 25 mg tablet, Take 1 tablet by mouth 2 (two) times a day as needed (Patient not taking: Reported on 6/21/2021), Disp: , Rfl:     budesonide (PULMICORT) 0 5 mg/2 mL nebulizer solution, EMPTY ONE RESPULE INTO YOUR SINUS RINSE BOTTLE AND LAVAGE NOSTRILS BID (Patient not taking: Reported on 6/21/2021), Disp: , Rfl: 3    cefuroxime (CEFTIN) 250 mg tablet, Take 1 tablet (250 mg total) by mouth every 12 (twelve) hours for 10 days, Disp: 20 tablet, Rfl: 0    denosumab (PROLIA) 60 mg/mL, Inject 1 mL (60 mg total) under the skin once for 1 dose, Disp: 1 mL, Rfl: 0    Glucosamine-Chondroitin 500-400 MG CAPS, Take 1 tablet by mouth daily (Patient not taking: Reported on 5/9/2022 ), Disp: , Rfl:     ondansetron (ZOFRAN) 4 mg tablet, Take 1 tablet (4 mg total) by mouth every 8 (eight) hours as needed for nausea or vomiting (Patient not taking: Reported on 6/23/2020), Disp: 20 tablet, Rfl: 0    predniSONE 10 mg tablet, Five p o  for 2 days Four p o  for 2 Three p o  for 2 days Two p o  for 2 days One p o  for 2 days, Disp: 30 tablet, Rfl: 0    Health Maintenance     Health Maintenance   Topic Date Due    COVID-19 Vaccine (1) Never done    DTaP,Tdap,and Td Vaccines (1 - Tdap) Never done    Influenza Vaccine (Season Ended) 09/01/2022    Fall Risk  12/27/2022    Depression Screening  12/27/2022    Medicare Annual Wellness Visit (AWV)  12/27/2022    BMI: Followup Plan  12/27/2022    Breast Cancer Screening: Mammogram  04/12/2023    BMI: Adult  05/09/2023    DXA SCAN  10/12/2023    Osteoporosis Screening  Completed    Pneumococcal Vaccine: 65+ Years  Completed    HIB Vaccine  Aged Out    Hepatitis B Vaccine  Aged Out    IPV Vaccine  Aged Out    Hepatitis A Vaccine  Aged Out    Meningococcal ACWY Vaccine  Aged Out    HPV Vaccine  Aged [employer] History   Administered Date(s) Administered    H1N1, All Formulations 01/11/2010    INFLUENZA 10/02/2013, 10/24/2014, 03/23/2015, 10/19/2015, 10/31/2016, 11/14/2016, 10/11/2017    Influenza Split High Dose Preservative Free IM 10/19/2014, 10/28/2015, 10/31/2016, 10/11/2017, 09/21/2020    Influenza, high dose seasonal 0 7 mL 10/17/2018    Influenza, injectable, quadrivalent, preservative free 0 5 mL 10/30/2019    Influenza, seasonal, injectable 10/26/2012, 10/16/2013    Pneumococcal Conjugate 13-Valent 05/21/2015    Pneumococcal Polysaccharide PPV23 11/09/2005, 03/16/2015    Zoster 01/01/2012    Zoster Vaccine Recombinant 02/06/2019, 06/26/2019       Mayito Dean DO  [facility]

## 2022-05-10 ENCOUNTER — TELEPHONE (OUTPATIENT)
Dept: FAMILY MEDICINE CLINIC | Facility: CLINIC | Age: 82
End: 2022-05-10

## 2022-05-25 ENCOUNTER — TELEPHONE (OUTPATIENT)
Dept: FAMILY MEDICINE CLINIC | Facility: CLINIC | Age: 82
End: 2022-05-25

## 2022-05-25 DIAGNOSIS — J30.9 ALLERGIC RHINITIS, UNSPECIFIED SEASONALITY, UNSPECIFIED TRIGGER: Primary | ICD-10-CM

## 2022-05-25 NOTE — TELEPHONE ENCOUNTER
Pt came in with  and asked for referral to an allergist  She said she spoke to [place] about this when she was in

## 2022-06-27 ENCOUNTER — CLINICAL SUPPORT (OUTPATIENT)
Dept: FAMILY MEDICINE CLINIC | Facility: CLINIC | Age: 82
End: 2022-06-27
Payer: MEDICARE

## 2022-06-27 DIAGNOSIS — M81.0 OSTEOPOROSIS WITHOUT CURRENT PATHOLOGICAL FRACTURE, UNSPECIFIED OSTEOPOROSIS TYPE: Primary | ICD-10-CM

## 2022-06-27 PROCEDURE — 96372 THER/PROPH/DIAG INJ SC/IM: CPT | Performed by: FAMILY MEDICINE

## 2022-07-05 ENCOUNTER — TELEPHONE (OUTPATIENT)
Dept: FAMILY MEDICINE CLINIC | Facility: CLINIC | Age: 82
End: 2022-07-05

## 2022-07-05 NOTE — TELEPHONE ENCOUNTER
Pt called asking if lab orders were faxed to [facility] on [address] [facility] notes, did not see anything one way or another, advised pt I would fax them over myself  Fax sent

## 2022-07-06 ENCOUNTER — OFFICE VISIT (OUTPATIENT)
Dept: FAMILY MEDICINE CLINIC | Facility: CLINIC | Age: 82
End: 2022-07-06
Payer: MEDICARE

## 2022-07-06 VITALS
TEMPERATURE: 98.2 F | HEIGHT: 65 IN | BODY MASS INDEX: 27.99 KG/M2 | DIASTOLIC BLOOD PRESSURE: 78 MMHG | HEART RATE: 72 BPM | WEIGHT: 168 LBS | SYSTOLIC BLOOD PRESSURE: 138 MMHG | OXYGEN SATURATION: 98 %

## 2022-07-06 DIAGNOSIS — I10 BENIGN ESSENTIAL HYPERTENSION: ICD-10-CM

## 2022-07-06 DIAGNOSIS — E78.2 MIXED HYPERLIPIDEMIA: ICD-10-CM

## 2022-07-06 DIAGNOSIS — I25.10 CORONARY ARTERY DISEASE INVOLVING NATIVE CORONARY ARTERY OF NATIVE HEART WITHOUT ANGINA PECTORIS: ICD-10-CM

## 2022-07-06 DIAGNOSIS — J32.9 CHRONIC SINUSITIS, UNSPECIFIED LOCATION: ICD-10-CM

## 2022-07-06 DIAGNOSIS — I50.21 ACUTE SYSTOLIC CHF (CONGESTIVE HEART FAILURE) (HCC): Primary | ICD-10-CM

## 2022-07-06 DIAGNOSIS — M19.90 ARTHRITIS: ICD-10-CM

## 2022-07-06 DIAGNOSIS — C56.9 MALIGNANT NEOPLASM OF OVARY, UNSPECIFIED LATERALITY (HCC): ICD-10-CM

## 2022-07-06 DIAGNOSIS — M81.6 LOCALIZED OSTEOPOROSIS WITHOUT CURRENT PATHOLOGICAL FRACTURE: ICD-10-CM

## 2022-07-06 DIAGNOSIS — E05.90 HYPERTHYROIDISM: ICD-10-CM

## 2022-07-06 DIAGNOSIS — J45.30 MILD PERSISTENT ASTHMA WITHOUT COMPLICATION: ICD-10-CM

## 2022-07-06 LAB
ALBUMIN SERPL-MCNC: 4.2 G/DL (ref 3.6–5.1)
ALBUMIN/GLOB SERPL: 1.7 (CALC) (ref 1–2.5)
ALP SERPL-CCNC: 66 U/L (ref 37–153)
ALT SERPL-CCNC: 14 U/L (ref 6–29)
AST SERPL-CCNC: 22 U/L (ref 10–35)
BILIRUB SERPL-MCNC: 0.6 MG/DL (ref 0.2–1.2)
BUN SERPL-MCNC: 17 MG/DL (ref 7–25)
BUN/CREAT SERPL: NORMAL (CALC) (ref 6–22)
CALCIUM SERPL-MCNC: 8.6 MG/DL (ref 8.6–10.4)
CHLORIDE SERPL-SCNC: 106 MMOL/L (ref 98–110)
CO2 SERPL-SCNC: 29 MMOL/L (ref 20–32)
CREAT SERPL-MCNC: 0.62 MG/DL (ref 0.6–0.88)
GLOBULIN SER CALC-MCNC: 2.5 G/DL (CALC) (ref 1.9–3.7)
GLUCOSE SERPL-MCNC: 91 MG/DL (ref 65–99)
POTASSIUM SERPL-SCNC: 4.5 MMOL/L (ref 3.5–5.3)
PROT SERPL-MCNC: 6.7 G/DL (ref 6.1–8.1)
SL AMB EGFR AFRICAN AMERICAN: 97 ML/MIN/1.73M2
SL AMB EGFR NON AFRICAN AMERICAN: 83 ML/MIN/1.73M2
SODIUM SERPL-SCNC: 141 MMOL/L (ref 135–146)
TSH SERPL-ACNC: 0.82 MIU/L (ref 0.4–4.5)

## 2022-07-06 PROCEDURE — 99214 OFFICE O/P EST MOD 30 MIN: CPT | Performed by: FAMILY MEDICINE

## 2022-07-06 RX ORDER — FLUTICASONE PROPIONATE 50 MCG
2 SPRAY, SUSPENSION (ML) NASAL DAILY
Qty: 48 G | Refills: 3 | Status: SHIPPED | OUTPATIENT
Start: 2022-07-06

## 2022-07-06 RX ORDER — ATORVASTATIN CALCIUM 20 MG/1
40 TABLET, FILM COATED ORAL DAILY
Qty: 180 TABLET | Refills: 1 | Status: SHIPPED | OUTPATIENT
Start: 2022-07-06 | End: 2022-07-20

## 2022-07-06 RX ORDER — LISINOPRIL 10 MG/1
10 TABLET ORAL DAILY
Qty: 90 TABLET | Refills: 1 | Status: SHIPPED | OUTPATIENT
Start: 2022-07-06

## 2022-07-06 RX ORDER — CELECOXIB 200 MG/1
200 CAPSULE ORAL DAILY
Qty: 90 CAPSULE | Refills: 2 | Status: SHIPPED | OUTPATIENT
Start: 2022-07-06

## 2022-07-06 NOTE — PROGRESS NOTES
[facility]      NAME: Kelly Pinedo  AGE: 79 y o  SEX: female  : 1940   MRN: [id number]    DATE: 2022  TIME: 1:47 PM    Assessment and Plan     Problem List Items Addressed This Visit     Mild persistent asthma without complication    Relevant Medications    fluticasone-vilanterol (Breo Ellipta) 200-25 MCG/INH inhaler    Malignant neoplasm of ovary ([facility] )     Continue follow-up with Oncology  Currently without evidence of recurrent disease  Localized osteoporosis without current pathological fracture     Continue Prolia  Hyperthyroidism    Hyperlipidemia    Relevant Medications    atorvastatin (LIPITOR) 20 mg tablet    Other Relevant Orders    Comprehensive metabolic panel    Lipid Panel with Direct LDL reflex    TSH, 3rd generation    Coronary artery disease involving native coronary artery of native heart without angina pectoris     Stable without symptoms  Continue aggressive risk factor modification  Chronic sinusitis    Relevant Medications    fluticasone-vilanterol (Breo Ellipta) 200-25 MCG/INH inhaler    fluticasone (FLONASE) 50 mcg/act nasal spray    Benign essential hypertension     Well controlled on lisinopril 10 mg and metoprolol succinate to 50 mg daily  Relevant Medications    lisinopril (ZESTRIL) 10 mg tablet    Acute systolic CHF (congestive heart failure) ([facility] ) - Primary      Other Visit Diagnoses     Arthritis        Relevant Medications    celecoxib (CeleBREX) 200 mg capsule              Return to office in:  6 months, annual wellness    Chief Complaint     Chief Complaint   Patient presents with    Follow-up       History of Present Illness     Patient was seen for routine follow-up of chronic medical problems  She is being treated for hyperlipidemia, coronary artery disease, hypertension, anxiety, asthma, congestive heart failure and coronary artery disease  She also has a history malignant neoplasm of her ovary    She has no complaints today other than allergies for which she takes over-the-counter antihistamine and Flonase  The following portions of the patient's history were reviewed and updated as appropriate: allergies, current medications, past family history, past medical history, past social history, past surgical history and problem list     Review of Systems   Review of Systems   Constitutional: Negative  HENT: Positive for congestion, postnasal drip and rhinorrhea  Respiratory: Negative  Cardiovascular: Negative  Gastrointestinal: Negative  Genitourinary: Negative  Musculoskeletal: Negative  Psychiatric/Behavioral: Negative  Active Problem List     Patient Active Problem List   Diagnosis    Abnormality of esophagus    Acute systolic CHF (congestive heart failure) (HCC)    Malignant neoplasm of ovary (HCC)    Anxiety    Asthma    Benign essential hypertension    Chronic sinusitis    Coronary artery disease involving native coronary artery of native heart without angina pectoris    Environmental and seasonal allergies    Gastroesophageal reflux disease with esophagitis    Ground glass opacity present on imaging of lung    Hyperlipidemia    Hyperthyroidism    Lung nodule    Mild persistent asthma without complication    Multinodular non-toxic goiter    NSTEMI (non-ST elevated myocardial infarction) ([facility] [address] )    Osteoarthritis    Osteopenia    Pancreatic cyst    Pulmonary nodule, right    Vitamin D deficiency    Personal history of medical treatment    Localized osteoporosis without current pathological fracture       Objective   /78   Pulse 72   Temp 98 2 °F (36 8 °C) (Tympanic)   Ht 5' 5" (1 651 m)   Wt 76 2 kg (168 lb)   SpO2 98%   BMI 27 96 kg/m²     Physical Exam  Vitals and nursing note reviewed  Constitutional:       General: She is not in acute distress  Appearance: She is well-developed  She is not diaphoretic     HENT:      Head: Normocephalic and atraumatic  Eyes:      General:         Right eye: No discharge  Conjunctiva/sclera: Conjunctivae normal       Pupils: Pupils are equal, round, and reactive to light  Neck:      Thyroid: No thyromegaly  Cardiovascular:      Rate and Rhythm: Normal rate and regular rhythm  Pulmonary:      Effort: Pulmonary effort is normal  No respiratory distress  Breath sounds: Normal breath sounds  Musculoskeletal:      Cervical back: Normal range of motion  Lymphadenopathy:      Cervical: No cervical adenopathy  Skin:     General: Skin is warm and dry  Neurological:      Mental Status: She is alert and oriented to person, place, and time  Psychiatric:         Behavior: Behavior normal          Thought Content:  Thought content normal          Judgment: Judgment normal            Current Medications     Current Outpatient Medications:     albuterol (PROVENTIL HFA,VENTOLIN HFA) 90 mcg/act inhaler, INHALE 2 PUFFS EVERY 6 HOURS AS NEEDED FOR WHEEZING OR SHORTNESS OF BREATH , Disp: 25 5 g, Rfl: 1    aspirin (ECOTRIN LOW STRENGTH) 81 mg EC tablet, Take 1 tablet by mouth daily, Disp: , Rfl:     atorvastatin (LIPITOR) 20 mg tablet, Take 2 tablets (40 mg total) by mouth daily, Disp: 180 tablet, Rfl: 1    celecoxib (CeleBREX) 200 mg capsule, Take 1 capsule (200 mg total) by mouth daily, Disp: 90 capsule, Rfl: 2    cetirizine (ZyrTEC) 10 mg tablet, TK 1 T PO NIGHTLY, Disp: , Rfl: 3    Cholecalciferol 125 MCG (5000 UT) capsule, Take 2,000 Units by mouth daily  , Disp: , Rfl:     denosumab (PROLIA) 60 mg/mL, Inject 1 mL (60 mg total) under the skin once for 1 dose, Disp: 1 mL, Rfl: 0    fluticasone (FLONASE) 50 mcg/act nasal spray, 2 sprays into each nostril daily Shake liquid and spray, Disp: 48 g, Rfl: 3    fluticasone-vilanterol (Breo Ellipta) 200-25 MCG/INH inhaler, Inhale 1 puff daily Rinse mouth after use , Disp: 180 each, Rfl: 0    ipratropium (ATROVENT) 0 02 % nebulizer solution, Inhale 1 each 4 (four) times a day as needed  , Disp: , Rfl:     lisinopril (ZESTRIL) 10 mg tablet, Take 1 tablet (10 mg total) by mouth daily, Disp: 90 tablet, Rfl: 1    metoprolol succinate (TOPROL-XL) 50 mg 24 hr tablet, Take 1 tablet (50 mg total) by mouth daily, Disp: 90 tablet, Rfl: 1    multivitamin (THERAGRAN) TABS, Take 1 tablet by mouth daily, Disp: , Rfl:     pantoprazole (PROTONIX) 40 mg tablet, Take 1 tablet (40 mg total) by mouth daily, Disp: 90 tablet, Rfl: 1    ALPRAZolam (XANAX) 0 25 mg tablet, Take 1 tablet by mouth 2 (two) times a day as needed (Patient not taking: No sig reported), Disp: , Rfl:     budesonide (PULMICORT) 0 5 mg/2 mL nebulizer solution, EMPTY ONE RESPULE INTO YOUR SINUS RINSE BOTTLE AND LAVAGE NOSTRILS BID (Patient not taking: No sig reported), Disp: , Rfl: 3    Glucosamine-Chondroitin 500-400 MG CAPS, Take 1 tablet by mouth daily (Patient not taking: No sig reported), Disp: , Rfl:     ondansetron (ZOFRAN) 4 mg tablet, Take 1 tablet (4 mg total) by mouth every 8 (eight) hours as needed for nausea or vomiting (Patient not taking: No sig reported), Disp: 20 tablet, Rfl: 0    Health Maintenance     Health Maintenance   Topic Date Due    DTaP,Tdap,and Td Vaccines (1 - Tdap) Never done    COVID-19 Vaccine (4 - Booster for Moderna series) 02/28/2022    Influenza Vaccine (1) 09/01/2022    Depression Screening  12/27/2022    BMI: Followup Plan  12/27/2022    Fall Risk  12/27/2022    Medicare Annual Wellness Visit (AWV)  12/27/2022    Breast Cancer Screening: Mammogram  04/12/2023    BMI: Adult  07/06/2023    DXA SCAN  10/12/2023    Osteoporosis Screening  Completed    Pneumococcal Vaccine: 65+ Years  Completed    HIB Vaccine  Aged Out    Hepatitis B Vaccine  Aged Out    IPV Vaccine  Aged Out    Hepatitis A Vaccine  Aged Out    Meningococcal ACWY Vaccine  Aged Out    HPV Vaccine  Aged [employer] History   Administered Date(s) Administered    COVID-19 MODERNA VACC 0 5 ML IM 01/31/2021, 02/28/2021, 10/29/2021    H1N1, All Formulations 01/11/2010    INFLUENZA 10/02/2013, 10/24/2014, 03/23/2015, 10/19/2015, 10/31/2016, 11/14/2016, 10/11/2017    Influenza Split High Dose Preservative Free IM 10/19/2014, 10/28/2015, 10/31/2016, 10/11/2017, 09/21/2020    Influenza, high dose seasonal 0 7 mL 10/17/2018    Influenza, injectable, quadrivalent, preservative free 0 5 mL 10/30/2019    Influenza, seasonal, injectable 10/26/2012, 10/16/2013    Pneumococcal Conjugate 13-Valent 05/21/2015    Pneumococcal Polysaccharide PPV23 11/09/2005, 03/16/2015    Zoster 01/01/2012    Zoster Vaccine Recombinant 02/06/2019, 06/26/2019       Nino Randall DO  [facility]

## 2022-07-19 DIAGNOSIS — K21.9 GASTROESOPHAGEAL REFLUX DISEASE WITHOUT ESOPHAGITIS: ICD-10-CM

## 2022-07-19 DIAGNOSIS — I10 BENIGN ESSENTIAL HYPERTENSION: ICD-10-CM

## 2022-07-19 DIAGNOSIS — E78.2 MIXED HYPERLIPIDEMIA: ICD-10-CM

## 2022-07-19 RX ORDER — METOPROLOL SUCCINATE 50 MG/1
TABLET, EXTENDED RELEASE ORAL
Qty: 90 TABLET | Refills: 1 | Status: SHIPPED | OUTPATIENT
Start: 2022-07-19

## 2022-07-19 RX ORDER — PANTOPRAZOLE SODIUM 40 MG/1
TABLET, DELAYED RELEASE ORAL
Qty: 90 TABLET | Refills: 1 | Status: SHIPPED | OUTPATIENT
Start: 2022-07-19

## 2022-07-20 RX ORDER — ATORVASTATIN CALCIUM 20 MG/1
TABLET, FILM COATED ORAL
Qty: 180 TABLET | Refills: 1 | Status: SHIPPED | OUTPATIENT
Start: 2022-07-20

## 2022-10-18 DIAGNOSIS — J32.9 CHRONIC SINUSITIS, UNSPECIFIED LOCATION: ICD-10-CM

## 2022-10-26 DIAGNOSIS — M81.0 OSTEOPOROSIS WITHOUT CURRENT PATHOLOGICAL FRACTURE, UNSPECIFIED OSTEOPOROSIS TYPE: ICD-10-CM

## 2022-12-20 ENCOUNTER — TELEPHONE (OUTPATIENT)
Dept: FAMILY MEDICINE CLINIC | Facility: CLINIC | Age: 82
End: 2022-12-20

## 2022-12-20 DIAGNOSIS — J32.9 CHRONIC SINUSITIS, UNSPECIFIED LOCATION: ICD-10-CM

## 2022-12-20 RX ORDER — FLUTICASONE FUROATE AND VILANTEROL 200; 25 UG/1; UG/1
POWDER RESPIRATORY (INHALATION)
Qty: 180 EACH | Refills: 0 | Status: SHIPPED | OUTPATIENT
Start: 2022-12-20

## 2022-12-20 NOTE — TELEPHONE ENCOUNTER
Nohemy Merida left  requesting a refill on both her Breo and Prednisone  I saw her Tracey Myers was sent but I left  what was the need for the Prednisone

## 2022-12-22 LAB
ALBUMIN SERPL-MCNC: 3.9 G/DL (ref 3.6–5.1)
ALBUMIN/GLOB SERPL: 1.5 (CALC) (ref 1–2.5)
ALP SERPL-CCNC: 56 U/L (ref 37–153)
ALT SERPL-CCNC: 12 U/L (ref 6–29)
AST SERPL-CCNC: 17 U/L (ref 10–35)
BILIRUB SERPL-MCNC: 0.5 MG/DL (ref 0.2–1.2)
BUN SERPL-MCNC: 21 MG/DL (ref 7–25)
BUN/CREAT SERPL: NORMAL (CALC) (ref 6–22)
CALCIUM SERPL-MCNC: 9.1 MG/DL (ref 8.6–10.4)
CHLORIDE SERPL-SCNC: 103 MMOL/L (ref 98–110)
CHOLEST SERPL-MCNC: 171 MG/DL
CHOLEST/HDLC SERPL: 2.2 (CALC)
CO2 SERPL-SCNC: 30 MMOL/L (ref 20–32)
CREAT SERPL-MCNC: 0.62 MG/DL (ref 0.6–0.95)
GFR/BSA.PRED SERPLBLD CYS-BASED-ARV: 88 ML/MIN/1.73M2
GLOBULIN SER CALC-MCNC: 2.6 G/DL (CALC) (ref 1.9–3.7)
GLUCOSE SERPL-MCNC: 90 MG/DL (ref 65–99)
HDLC SERPL-MCNC: 78 MG/DL
LDLC SERPL CALC-MCNC: 78 MG/DL (CALC)
NONHDLC SERPL-MCNC: 93 MG/DL (CALC)
POTASSIUM SERPL-SCNC: 4.5 MMOL/L (ref 3.5–5.3)
PROT SERPL-MCNC: 6.5 G/DL (ref 6.1–8.1)
SODIUM SERPL-SCNC: 140 MMOL/L (ref 135–146)
TRIGL SERPL-MCNC: 69 MG/DL
TSH SERPL-ACNC: 0.76 MIU/L (ref 0.4–4.5)

## 2022-12-28 ENCOUNTER — OFFICE VISIT (OUTPATIENT)
Dept: FAMILY MEDICINE CLINIC | Facility: CLINIC | Age: 82
End: 2022-12-28

## 2022-12-28 VITALS
SYSTOLIC BLOOD PRESSURE: 120 MMHG | TEMPERATURE: 97.7 F | HEART RATE: 76 BPM | RESPIRATION RATE: 16 BRPM | OXYGEN SATURATION: 98 % | WEIGHT: 166 LBS | DIASTOLIC BLOOD PRESSURE: 70 MMHG | HEIGHT: 64 IN | BODY MASS INDEX: 28.34 KG/M2

## 2022-12-28 DIAGNOSIS — I25.10 CORONARY ARTERY DISEASE INVOLVING NATIVE CORONARY ARTERY OF NATIVE HEART WITHOUT ANGINA PECTORIS: ICD-10-CM

## 2022-12-28 DIAGNOSIS — E78.2 MIXED HYPERLIPIDEMIA: ICD-10-CM

## 2022-12-28 DIAGNOSIS — J32.9 CHRONIC SINUSITIS, UNSPECIFIED LOCATION: ICD-10-CM

## 2022-12-28 DIAGNOSIS — J45.30 MILD PERSISTENT ASTHMA WITHOUT COMPLICATION: ICD-10-CM

## 2022-12-28 DIAGNOSIS — M19.90 ARTHRITIS: ICD-10-CM

## 2022-12-28 DIAGNOSIS — I10 BENIGN ESSENTIAL HYPERTENSION: Primary | ICD-10-CM

## 2022-12-28 DIAGNOSIS — C56.9 MALIGNANT NEOPLASM OF OVARY, UNSPECIFIED LATERALITY (HCC): ICD-10-CM

## 2022-12-28 DIAGNOSIS — Z00.00 MEDICARE ANNUAL WELLNESS VISIT, SUBSEQUENT: ICD-10-CM

## 2022-12-28 DIAGNOSIS — M81.6 LOCALIZED OSTEOPOROSIS WITHOUT CURRENT PATHOLOGICAL FRACTURE: ICD-10-CM

## 2022-12-28 RX ORDER — METOPROLOL SUCCINATE 50 MG/1
50 TABLET, EXTENDED RELEASE ORAL DAILY
Qty: 90 TABLET | Refills: 1 | Status: SHIPPED | OUTPATIENT
Start: 2022-12-28

## 2022-12-28 RX ORDER — ALBUTEROL SULFATE 90 UG/1
2 AEROSOL, METERED RESPIRATORY (INHALATION) EVERY 4 HOURS PRN
Qty: 25.5 G | Refills: 1 | Status: SHIPPED | OUTPATIENT
Start: 2022-12-28

## 2022-12-28 RX ORDER — LISINOPRIL 10 MG/1
10 TABLET ORAL DAILY
Qty: 90 TABLET | Refills: 1 | Status: SHIPPED | OUTPATIENT
Start: 2022-12-28

## 2022-12-28 RX ORDER — PREDNISONE 10 MG/1
TABLET ORAL
Qty: 21 TABLET | Refills: 0 | Status: SHIPPED | OUTPATIENT
Start: 2022-12-28

## 2022-12-28 RX ORDER — FLUTICASONE FUROATE AND VILANTEROL 200; 25 UG/1; UG/1
POWDER RESPIRATORY (INHALATION)
Qty: 180 EACH | Refills: 0 | Status: CANCELLED | OUTPATIENT
Start: 2022-12-28

## 2022-12-28 RX ORDER — CELECOXIB 200 MG/1
200 CAPSULE ORAL DAILY
Qty: 90 CAPSULE | Refills: 2 | Status: SHIPPED | OUTPATIENT
Start: 2022-12-28

## 2022-12-28 NOTE — PROGRESS NOTES
Assessment and Plan:     Problem List Items Addressed This Visit     Malignant neoplasm of ovary ([facility] )    Localized osteoporosis without current pathological fracture    Hyperlipidemia    Coronary artery disease involving native coronary artery of native heart without angina pectoris    Chronic sinusitis    Benign essential hypertension    Asthma   Other Visit Diagnoses     Medicare annual wellness visit, subsequent    -  Primary    Questionnaire reviewed  Immunization and health screening history is updated  Advance directive in place  Arthritis            BMI Counseling: There is no height or weight on file to calculate BMI  The BMI is above normal  Nutrition recommendations include encouraging healthy choices of fruits and vegetables, decreasing fast food intake, moderation in carbohydrate intake and increasing intake of lean protein  Exercise recommendations include exercising 3-5 times per week  No pharmacotherapy was ordered  Rationale for BMI follow-up plan is due to patient being overweight or obese  Depression Screening and Follow-up Plan: Patient was screened for depression during today's encounter  They screened negative with a PHQ-2 score of 0  Preventive health issues were discussed with patient, and age appropriate screening tests were ordered as noted in patient's After Visit Summary  Personalized health advice and appropriate referrals for health education or preventive services given if needed, as noted in patient's After Visit Summary       History of Present Illness:     Patient presents for a Medicare Wellness Visit    HPI   Patient Care Team:  Angie Johnson DO as PCP - Dean Crane MD as PCP - OBGYN (Obstetrics and Gynecology)  Rowan Bergman MD     Review of Systems:     Review of Systems     Problem List:     Patient Active Problem List   Diagnosis   • Abnormality of esophagus   • Acute systolic CHF (congestive heart failure) ([facility] )   • Malignant neoplasm of ovary [facility])   • Anxiety   • Asthma   • Benign essential hypertension   • Chronic sinusitis   • Coronary artery disease involving native coronary artery of native heart without angina pectoris   • Environmental and seasonal allergies   • Gastroesophageal reflux disease with esophagitis   • Ground glass opacity present on imaging of lung   • Hyperlipidemia   • Hyperthyroidism   • Lung nodule   • Mild persistent asthma without complication   • Multinodular non-toxic goiter   • NSTEMI (non-ST elevated myocardial infarction) ([facility] )   • Osteoarthritis   • Osteopenia   • Pancreatic cyst   • Pulmonary nodule, right   • Vitamin D deficiency   • Personal history of medical treatment   • Localized osteoporosis without current pathological fracture      Past Medical and Surgical History:     Past Medical History:   Diagnosis Date   • Allergic    • Allergic rhinitis     Last Assessed:5/21/2015   • Benign essential hypertension     Last Assessed:4/24/2014   • Hypertension    • Tachycardia     Last Assessed:12/22/2012     Past Surgical History:   Procedure Laterality Date   • CARDIAC SURGERY  2017   • HYSTERECTOMY     • OOPHORECTOMY Bilateral     age 47   • THYROIDECTOMY, PARTIAL  2016      Family History:     Family History   Problem Relation Age of Onset   • Breast cancer Mother 46   • Heart attack Father    • Melanoma Daughter 25   • No Known Problems Sister    • No Known Problems Sister    • No Known Problems Maternal Aunt    • No Known Problems Maternal Grandmother    • No Known Problems Maternal Grandfather    • No Known Problems Paternal Grandmother    • No Known Problems Paternal Grandfather    • BRCA1 Negative Sister    • Breast cancer Maternal Aunt         in 63's   • No Known Problems Paternal Aunt       Social History:     Social History     Socioeconomic History   • Marital status: /Civil Union     Spouse name: None   • Number of children: None   • Years of education: None   • Highest education level: None Occupational History   • None   Tobacco Use   • Smoking status: Never   • Smokeless tobacco: Never   Vaping Use   • Vaping Use: Never used   Substance and Sexual Activity   • Alcohol use: Not Currently     Comment: occasional-rare   • Drug use: No   • Sexual activity: Not Currently     Partners: Male   Other Topics Concern   • None   Social History Narrative   • None     Social Determinants of Health     Financial Resource Strain: Low Risk    • Difficulty of Paying Living Expenses: Not hard at all   Food Insecurity: Not on file   Transportation Needs: No Transportation Needs   • Lack of Transportation (Medical): No   • Lack of Transportation (Non-Medical): No   Physical Activity: Not on file   Stress: Not on file   Social Connections: Not on file   Intimate Partner Violence: Not on file   Housing Stability: Not on file      Medications and Allergies:     Current Outpatient Medications   Medication Sig Dispense Refill   • albuterol (PROVENTIL HFA,VENTOLIN HFA) 90 mcg/act inhaler INHALE 2 PUFFS EVERY 6 HOURS AS NEEDED FOR WHEEZING OR SHORTNESS OF BREATH  25 5 g 1   • aspirin (ECOTRIN LOW STRENGTH) 81 mg EC tablet Take 1 tablet by mouth daily     • atorvastatin (LIPITOR) 20 mg tablet TAKE 2 TABLETS(40 MG) BY MOUTH DAILY 180 tablet 1   • celecoxib (CeleBREX) 200 mg capsule Take 1 capsule (200 mg total) by mouth daily 90 capsule 2   • cetirizine (ZyrTEC) 10 mg tablet TK 1 T PO NIGHTLY  3   • Cholecalciferol 125 MCG (5000 UT) capsule Take 2,000 Units by mouth daily       • denosumab (PROLIA) 60 mg/mL Inject 1 mL (60 mg total) under the skin once for 1 dose 1 mL 0   • fluticasone (FLONASE) 50 mcg/act nasal spray 2 sprays into each nostril daily Shake liquid and spray 48 g 3   • fluticasone-vilanterol 200-25 mcg/actuation inhaler INHALE 1 PUFF BY MOUTH DAILY   RINSE MOUTH AFTER  each 0   • ipratropium (ATROVENT) 0 02 % nebulizer solution Inhale 1 each 4 (four) times a day as needed       • lisinopril (ZESTRIL) 10 mg tablet Take 1 tablet (10 mg total) by mouth daily 90 tablet 1   • metoprolol succinate (TOPROL-XL) 50 mg 24 hr tablet TAKE 1 TABLET(50 MG) BY MOUTH DAILY 90 tablet 1   • multivitamin (THERAGRAN) TABS Take 1 tablet by mouth daily     • ALPRAZolam (XANAX) 0 25 mg tablet Take 1 tablet by mouth 2 (two) times a day as needed (Patient not taking: Reported on 6/21/2021)     • budesonide (PULMICORT) 0 5 mg/2 mL nebulizer solution EMPTY ONE RESPULE INTO YOUR SINUS RINSE BOTTLE AND LAVAGE NOSTRILS BID (Patient not taking: No sig reported)  3   • Glucosamine-Chondroitin 500-400 MG CAPS Take 1 tablet by mouth daily (Patient not taking: Reported on 5/9/2022)     • ondansetron (ZOFRAN) 4 mg tablet Take 1 tablet (4 mg total) by mouth every 8 (eight) hours as needed for nausea or vomiting (Patient not taking: Reported on 6/23/2020) 20 tablet 0   • pantoprazole (PROTONIX) 40 mg tablet TAKE 1 TABLET(40 MG) BY MOUTH DAILY (Patient not taking: Reported on 12/28/2022) 90 tablet 1     No current facility-administered medications for this visit       Allergies   Allergen Reactions   • Cat Hair Extract    • Molds & Smuts    • Other    • Pollen Extract       Immunizations:     Immunization History   Administered Date(s) Administered   • COVID-19 MODERNA VACC 0 5 ML IM 01/31/2021, 02/28/2021, 10/29/2021   • H1N1, All Formulations 01/11/2010   • INFLUENZA 10/02/2013, 10/24/2014, 03/23/2015, 10/19/2015, 10/31/2016, 11/14/2016, 10/11/2017   • Influenza Split High Dose Preservative Free IM 10/19/2014, 10/28/2015, 10/31/2016, 10/11/2017, 09/21/2020   • Influenza, high dose seasonal 0 7 mL 10/17/2018   • Influenza, injectable, quadrivalent, preservative free 0 5 mL 10/30/2019   • Influenza, seasonal, injectable 10/26/2012, 10/16/2013   • Pneumococcal Conjugate 13-Valent 05/21/2015   • Pneumococcal Polysaccharide PPV23 11/09/2005, 03/16/2015   • Zoster 01/01/2012   • Zoster Vaccine Recombinant 02/06/2019, 06/26/2019      Health Maintenance: Topic Date Due   • Breast Cancer Screening: Mammogram  04/12/2023   • DXA SCAN  10/12/2023         Topic Date Due   • Hepatitis B Vaccine (1 of 3 - 3-dose series) Never done   • COVID-19 Vaccine (4 - Booster for Moderna series) 12/24/2021   • Influenza Vaccine (1) 09/01/2022      Medicare Screening Tests and Risk Assessments:     Carlotta Santoro is here for her Subsequent Wellness visit  Health Risk Assessment:   Patient rates overall health as good  Patient feels that their physical health rating is same  Patient is satisfied with their life  Eyesight was rated as same  Hearing was rated as same  Patient feels that their emotional and mental health rating is same  Patients states they are never, rarely angry  Patient states they are sometimes unusually tired/fatigued  Pain experienced in the last 7 days has been none  Patient states that she has experienced no weight loss or gain in last 6 months  Depression Screening:   PHQ-2 Score: 0      Fall Risk Screening: In the past year, patient has experienced: no history of falling in past year      Urinary Incontinence Screening:   Patient has not leaked urine accidently in the last six months  Home Safety:  Patient does not have trouble with stairs inside or outside of their home  Patient has working smoke alarms and has working carbon monoxide detector  Home safety hazards include: none  Nutrition:   Current diet is Regular and Limited junk food  Medications:   Patient is currently taking over-the-counter supplements  OTC medications include: see medication list  Patient is able to manage medications  Activities of Daily Living (ADLs)/Instrumental Activities of Daily Living (IADLs):   Walk and transfer into and out of bed and chair?: Yes  Dress and groom yourself?: Yes    Bathe or shower yourself?: Yes    Feed yourself?  Yes  Do your laundry/housekeeping?: Yes  Manage your money, pay your bills and track your expenses?: Yes  Make your own meals?: Yes Do your own shopping?: Yes    Previous Hospitalizations:   Any hospitalizations or ED visits within the last 12 months?: No      Advance Care Planning:   Living will: Yes    Durable POA for healthcare: Yes    Advanced directive: Yes    Five wishes given: No      Cognitive Screening:   Provider or family/friend/caregiver concerned regarding cognition?: No    PREVENTIVE SCREENINGS      Cardiovascular Screening:    General: Screening Not Indicated and History Lipid Disorder      Diabetes Screening:     General: Screening Current      Colorectal Cancer Screening:     General: Screening Not Indicated      Breast Cancer Screening:     General: Screening Current      Cervical Cancer Screening:    General: Screening Not Indicated      Osteoporosis Screening:    General: Screening Not Indicated and History Osteoporosis      Abdominal Aortic Aneurysm (AAA) Screening:        General: Screening Not Indicated      Lung Cancer Screening:     General: Screening Not Indicated    Screening, Brief Intervention, and Referral to Treatment (SBIRT)    Screening  Typical number of drinks in a day: 0  Typical number of drinks in a week: 0  Interpretation: Low risk drinking behavior  AUDIT-C Screenin) How often did you have a drink containing alcohol in the past year? never  2) How many drinks did you have on a typical day when you were drinking in the past year? 0  3) How often did you have 6 or more drinks on one occasion in the past year? never    AUDIT-C Score: 0  Interpretation: Score 0-2 (female): Negative screen for alcohol misuse    Single Item Drug Screening:  How often have you used an illegal drug (including marijuana) or a prescription medication for non-medical reasons in the past year? never    Single Item Drug Screen Score: 0  Interpretation: Negative screen for possible drug use disorder    Brief Intervention  Alcohol & drug use screenings were reviewed  No concerns regarding substance use disorder identified  No results found       Physical Exam:     /70 (BP Location: Left arm, Patient Position: Sitting, Cuff Size: Standard)   Pulse 76   Temp 97 7 °F (36 5 °C) (Tympanic)   Resp 16   Ht 5' 4" (1 626 m)   Wt 75 3 kg (166 lb)   SpO2 98%   BMI 28 49 kg/m²     Physical Exam     Bc Umaña DO

## 2022-12-28 NOTE — PROGRESS NOTES
Name: Andreas Mireles      : 1940      MRN: [id number]  Encounter Provider: Zachary Harrison DO  Encounter Date: 2022   Encounter department: [address] [place] [address]  Benign essential hypertension  Assessment & Plan:  Hypertension stable  Continue lisinopril and metoprolol  Orders:  -     metoprolol succinate (TOPROL-XL) 50 mg 24 hr tablet; Take 1 tablet (50 mg total) by mouth daily  -     lisinopril (ZESTRIL) 10 mg tablet; Take 1 tablet (10 mg total) by mouth daily  -     Comprehensive metabolic panel; Future; Expected date: 2023  -     TSH, 3rd generation with Free T4 reflex; Future; Expected date: 2023  -     Comprehensive metabolic panel  -     TSH, 3rd generation with Free T4 reflex    2  Mild persistent asthma without complication  -     albuterol (PROVENTIL HFA,VENTOLIN HFA) 90 mcg/act inhaler; Inhale 2 puffs every 4 (four) hours as needed for wheezing    3  Arthritis  -     celecoxib (CeleBREX) 200 mg capsule; Take 1 capsule (200 mg total) by mouth daily    4  Chronic sinusitis, unspecified location  Assessment & Plan:  Short steroid taper for acute flare of chronic sinusitis  Patient will be seeing her ENT in [place] where she will be traveling next week  Orders:  -     predniSONE 10 mg tablet; Six p o  for 1 day then 5 p o  for 1 day then 4 p o  for 1 day then 3 po for 1 day and 2 p o  for 1 day then 1 p o     5  Medicare annual wellness visit, subsequent  Comments:  Questionnaire reviewed  Immunization and health screening history is updated  Advance directive in place  6  Coronary artery disease involving native coronary artery of native heart without angina pectoris  Assessment & Plan:  Stable without symptoms  Continue aggressive risk factor management      7  Mixed hyperlipidemia  Assessment & Plan:  Lipids well controlled  At goal with LDL cholesterol less than 100 and HDL greater than 70    Continue atorvastatin 20 mg      8  Localized osteoporosis without current pathological fracture  Assessment & Plan:  Continue Prolia      9  Malignant neoplasm of ovary, unspecified laterality ([facility] )           Subjective      Patient was seen for routine follow-up of chronic medical problems  She is being treated for hyperlipidemia, hypertension, coronary artery disease, asthma and chronic environmental and seasonal allergies  Overall she feels she is doing well  Her biggest issue is her sinuses and allergies  She is seeing ENT both locally and in [place]  Review of Systems   Constitutional: Negative  HENT: Positive for congestion, postnasal drip and sinus pressure  Respiratory: Negative  Cardiovascular: Negative  Gastrointestinal: Negative  Genitourinary: Negative  Musculoskeletal: Negative  Psychiatric/Behavioral: Negative  Current Outpatient Medications on File Prior to Visit   Medication Sig   • aspirin (ECOTRIN LOW STRENGTH) 81 mg EC tablet Take 1 tablet by mouth daily   • atorvastatin (LIPITOR) 20 mg tablet TAKE 2 TABLETS(40 MG) BY MOUTH DAILY   • cetirizine (ZyrTEC) 10 mg tablet TK 1 T PO NIGHTLY   • Cholecalciferol 125 MCG (5000 UT) capsule Take 2,000 Units by mouth daily     • denosumab (PROLIA) 60 mg/mL Inject 1 mL (60 mg total) under the skin once for 1 dose   • fluticasone (FLONASE) 50 mcg/act nasal spray 2 sprays into each nostril daily Shake liquid and spray   • fluticasone-vilanterol 200-25 mcg/actuation inhaler INHALE 1 PUFF BY MOUTH DAILY  RINSE MOUTH AFTER USE   • ipratropium (ATROVENT) 0 02 % nebulizer solution Inhale 1 each 4 (four) times a day as needed     • multivitamin (THERAGRAN) TABS Take 1 tablet by mouth daily   • [DISCONTINUED] albuterol (PROVENTIL HFA,VENTOLIN HFA) 90 mcg/act inhaler INHALE 2 PUFFS EVERY 6 HOURS AS NEEDED FOR WHEEZING OR SHORTNESS OF BREATH     • [DISCONTINUED] celecoxib (CeleBREX) 200 mg capsule Take 1 capsule (200 mg total) by mouth daily   • [DISCONTINUED] lisinopril (ZESTRIL) 10 mg tablet Take 1 tablet (10 mg total) by mouth daily   • [DISCONTINUED] metoprolol succinate (TOPROL-XL) 50 mg 24 hr tablet TAKE 1 TABLET(50 MG) BY MOUTH DAILY   • ALPRAZolam (XANAX) 0 25 mg tablet Take 1 tablet by mouth 2 (two) times a day as needed (Patient not taking: Reported on 6/21/2021)   • budesonide (PULMICORT) 0 5 mg/2 mL nebulizer solution EMPTY ONE RESPULE INTO YOUR SINUS RINSE BOTTLE AND LAVAGE NOSTRILS BID (Patient not taking: No sig reported)   • Glucosamine-Chondroitin 500-400 MG CAPS Take 1 tablet by mouth daily (Patient not taking: Reported on 5/9/2022)   • ondansetron (ZOFRAN) 4 mg tablet Take 1 tablet (4 mg total) by mouth every 8 (eight) hours as needed for nausea or vomiting (Patient not taking: Reported on 6/23/2020)   • pantoprazole (PROTONIX) 40 mg tablet TAKE 1 TABLET(40 MG) BY MOUTH DAILY (Patient not taking: Reported on 12/28/2022)       Objective     /70 (BP Location: Left arm, Patient Position: Sitting, Cuff Size: Standard)   Pulse 76   Temp 97 7 °F (36 5 °C) (Tympanic)   Resp 16   Ht 5' 4" (1 626 m)   Wt 75 3 kg (166 lb)   SpO2 98%   BMI 28 49 kg/m²     Physical Exam  Vitals and nursing note reviewed  Constitutional:       General: She is not in acute distress  Appearance: She is well-developed  She is not diaphoretic  HENT:      Head: Normocephalic and atraumatic  Eyes:      General:         Right eye: No discharge  Conjunctiva/sclera: Conjunctivae normal       Pupils: Pupils are equal, round, and reactive to light  Neck:      Thyroid: No thyromegaly  Cardiovascular:      Rate and Rhythm: Normal rate and regular rhythm  Pulmonary:      Effort: Pulmonary effort is normal  No respiratory distress  Breath sounds: Normal breath sounds  Musculoskeletal:      Cervical back: Normal range of motion  Lymphadenopathy:      Cervical: No cervical adenopathy  Skin:     General: Skin is warm and dry     Neurological:      Mental Status: She is alert and oriented to person, place, and time  Psychiatric:         Behavior: Behavior normal          Thought Content:  Thought content normal          Judgment: Judgment normal        Aldair Chambers, DO

## 2022-12-28 NOTE — ASSESSMENT & PLAN NOTE
Lipids well controlled  At goal with LDL cholesterol less than 100 and HDL greater than 70    Continue atorvastatin 20 mg

## 2022-12-28 NOTE — ASSESSMENT & PLAN NOTE
Short steroid taper for acute flare of chronic sinusitis  Patient will be seeing her ENT in [place] where she will be traveling next week

## 2023-01-24 DIAGNOSIS — E78.2 MIXED HYPERLIPIDEMIA: ICD-10-CM

## 2023-01-24 RX ORDER — ATORVASTATIN CALCIUM 20 MG/1
TABLET, FILM COATED ORAL
Qty: 180 TABLET | Refills: 1 | Status: SHIPPED | OUTPATIENT
Start: 2023-01-24

## 2023-03-18 DIAGNOSIS — J32.9 CHRONIC SINUSITIS, UNSPECIFIED LOCATION: ICD-10-CM

## 2023-03-20 RX ORDER — FLUTICASONE FUROATE AND VILANTEROL 200; 25 UG/1; UG/1
POWDER RESPIRATORY (INHALATION)
Qty: 180 EACH | Refills: 0 | Status: SHIPPED | OUTPATIENT
Start: 2023-03-20

## 2023-03-29 DIAGNOSIS — J45.30 MILD PERSISTENT ASTHMA WITHOUT COMPLICATION: Primary | ICD-10-CM

## 2023-03-29 RX ORDER — FLUTICASONE FUROATE AND VILANTEROL 200; 25 UG/1; UG/1
1 POWDER RESPIRATORY (INHALATION) DAILY
Qty: 60 BLISTER | Refills: 5 | Status: SHIPPED | OUTPATIENT
Start: 2023-03-29 | End: 2023-09-25

## 2023-03-31 DIAGNOSIS — J45.30 MILD PERSISTENT ASTHMA WITHOUT COMPLICATION: ICD-10-CM

## 2023-04-01 RX ORDER — ALBUTEROL SULFATE 90 UG/1
AEROSOL, METERED RESPIRATORY (INHALATION)
Qty: 25.5 G | Refills: 1 | Status: SHIPPED | OUTPATIENT
Start: 2023-04-01

## 2023-06-26 ENCOUNTER — TELEPHONE (OUTPATIENT)
Dept: FAMILY MEDICINE CLINIC | Facility: CLINIC | Age: 83
End: 2023-06-26

## 2023-06-26 DIAGNOSIS — M81.0 OSTEOPOROSIS WITHOUT CURRENT PATHOLOGICAL FRACTURE, UNSPECIFIED OSTEOPOROSIS TYPE: ICD-10-CM

## 2023-06-26 LAB
ALBUMIN SERPL-MCNC: 4.3 G/DL (ref 3.6–5.1)
ALBUMIN/GLOB SERPL: 1.7 (CALC) (ref 1–2.5)
ALP SERPL-CCNC: 58 U/L (ref 37–153)
ALT SERPL-CCNC: 19 U/L (ref 6–29)
AST SERPL-CCNC: 27 U/L (ref 10–35)
BILIRUB SERPL-MCNC: 0.5 MG/DL (ref 0.2–1.2)
BUN SERPL-MCNC: 15 MG/DL (ref 7–25)
BUN/CREAT SERPL: NORMAL (CALC) (ref 6–22)
CALCIUM SERPL-MCNC: 9.5 MG/DL (ref 8.6–10.4)
CHLORIDE SERPL-SCNC: 105 MMOL/L (ref 98–110)
CO2 SERPL-SCNC: 30 MMOL/L (ref 20–32)
CREAT SERPL-MCNC: 0.63 MG/DL (ref 0.6–0.95)
GFR/BSA.PRED SERPLBLD CYS-BASED-ARV: 87 ML/MIN/1.73M2
GLOBULIN SER CALC-MCNC: 2.6 G/DL (CALC) (ref 1.9–3.7)
GLUCOSE SERPL-MCNC: 98 MG/DL (ref 65–99)
POTASSIUM SERPL-SCNC: 4.5 MMOL/L (ref 3.5–5.3)
PROT SERPL-MCNC: 6.9 G/DL (ref 6.1–8.1)
SODIUM SERPL-SCNC: 143 MMOL/L (ref 135–146)
TSH SERPL-ACNC: 1.55 MIU/L (ref 0.4–4.5)

## 2023-06-26 NOTE — TELEPHONE ENCOUNTER
Pt has an appt on 6/28 for 6 mos follow up and is due for her Prolia, but there is nothing mentioned anywhere for it  Her  is also coming in that day for his injection  Please send script to [facility] on [address]  Pt said they would need it today to get it in by Wednesday

## 2023-06-28 ENCOUNTER — OFFICE VISIT (OUTPATIENT)
Dept: FAMILY MEDICINE CLINIC | Facility: CLINIC | Age: 83
End: 2023-06-28
Payer: MEDICARE

## 2023-06-28 VITALS
BODY MASS INDEX: 28.36 KG/M2 | HEART RATE: 80 BPM | HEIGHT: 65 IN | DIASTOLIC BLOOD PRESSURE: 78 MMHG | OXYGEN SATURATION: 97 % | WEIGHT: 170.2 LBS | SYSTOLIC BLOOD PRESSURE: 150 MMHG | TEMPERATURE: 98.5 F

## 2023-06-28 DIAGNOSIS — E78.2 MIXED HYPERLIPIDEMIA: ICD-10-CM

## 2023-06-28 DIAGNOSIS — J45.30 MILD PERSISTENT ASTHMA WITHOUT COMPLICATION: ICD-10-CM

## 2023-06-28 DIAGNOSIS — C56.9 MALIGNANT NEOPLASM OF OVARY, UNSPECIFIED LATERALITY (HCC): ICD-10-CM

## 2023-06-28 DIAGNOSIS — M81.0 OSTEOPOROSIS WITHOUT CURRENT PATHOLOGICAL FRACTURE, UNSPECIFIED OSTEOPOROSIS TYPE: Primary | ICD-10-CM

## 2023-06-28 DIAGNOSIS — I10 BENIGN ESSENTIAL HYPERTENSION: ICD-10-CM

## 2023-06-28 PROBLEM — I50.21 ACUTE SYSTOLIC CHF (CONGESTIVE HEART FAILURE) (HCC): Status: RESOLVED | Noted: 2017-12-20 | Resolved: 2023-06-28

## 2023-06-28 PROCEDURE — 99214 OFFICE O/P EST MOD 30 MIN: CPT | Performed by: FAMILY MEDICINE

## 2023-06-28 PROCEDURE — 96372 THER/PROPH/DIAG INJ SC/IM: CPT | Performed by: FAMILY MEDICINE

## 2023-06-28 RX ORDER — DUPILUMAB 300 MG/2ML
INJECTION, SOLUTION SUBCUTANEOUS
COMMUNITY
Start: 2023-05-15

## 2023-06-28 RX ORDER — ATORVASTATIN CALCIUM 20 MG/1
40 TABLET, FILM COATED ORAL DAILY
Qty: 180 TABLET | Refills: 1 | Status: SHIPPED | OUTPATIENT
Start: 2023-06-28

## 2023-06-28 RX ORDER — LISINOPRIL 10 MG/1
10 TABLET ORAL DAILY
Qty: 90 TABLET | Refills: 1 | Status: SHIPPED | OUTPATIENT
Start: 2023-06-28

## 2023-06-28 NOTE — ASSESSMENT & PLAN NOTE
Stable  No symptoms at present  Continue risk factor modification  Continue follow-up as needed with cardiology

## 2023-06-28 NOTE — PROGRESS NOTES
Name: Sam Escamilla      : 1940      MRN: [id number]  Encounter Provider: Negar Norton DO  Encounter Date: 2023   Encounter department: [address] [place] [address]     1  Osteoporosis without current pathological fracture, unspecified osteoporosis type  -     denosumab (PROLIA) subcutaneous injection 60 mg  -     DXA bone density spine hip and pelvis; Future; Expected date: 10/15/2023    2  Mixed hyperlipidemia  -     atorvastatin (LIPITOR) 20 mg tablet; Take 2 tablets (40 mg total) by mouth daily  -     Comprehensive metabolic panel; Future; Expected date: 12/15/2023  -     TSH, 3rd generation; Future; Expected date: 12/15/2023  -     Comprehensive metabolic panel  -     TSH, 3rd generation    3  Benign essential hypertension  Assessment & Plan:  Blood pressure stable on lisinopril 10 mg daily    Orders:  -     lisinopril (ZESTRIL) 10 mg tablet; Take 1 tablet (10 mg total) by mouth daily    4  Malignant neoplasm of ovary, unspecified laterality ([facility] [address] )  Assessment & Plan:  Stable with no evidence of recurrence  Orders:  -     CBC and differential; Future; Expected date: 12/15/2023  -     CBC and differential    5  Mild persistent asthma without complication  Assessment & Plan:  Continues on fluticasone/vilanterol all 200/25 inhaler             Subjective      Patient was seen for routine follow-up of chronic medical problems  Patient is being treated for osteoporosis, hypothyroidism, hyperlipidemia, coronary artery disease, hypertension, asthma, congestive heart failure  Overall she feels well  She recently started treatment with Dupixent for asthma and is doing well with that  Review of Systems   Constitutional: Negative  Respiratory: Negative  Cardiovascular: Negative  Gastrointestinal: Negative  Genitourinary: Negative  Musculoskeletal: Negative  Psychiatric/Behavioral: Negative          Current Outpatient Medications on File Prior to Visit Medication Sig   • albuterol (PROVENTIL HFA,VENTOLIN HFA) 90 mcg/act inhaler INHALE 2 PUFFS BY MOUTH EVERY 4 HOURS AS NEEDED FOR WHEEZING   • aspirin (ECOTRIN LOW STRENGTH) 81 mg EC tablet Take 1 tablet by mouth daily   • celecoxib (CeleBREX) 200 mg capsule Take 1 capsule (200 mg total) by mouth daily   • cetirizine (ZyrTEC) 10 mg tablet TK 1 T PO NIGHTLY   • Cholecalciferol 125 MCG (5000 UT) capsule Take 2,000 Units by mouth daily     • Dupixent 300 MG/2ML SOPN every 14 (fourteen) days   • fluticasone (FLONASE) 50 mcg/act nasal spray 2 sprays into each nostril daily Shake liquid and spray   • fluticasone-vilanterol 200-25 mcg/actuation inhaler Inhale 1 puff daily Rinse mouth after use  • Glucosamine-Chondroitin 500-400 MG CAPS Take 1 tablet by mouth daily   • ipratropium (ATROVENT) 0 02 % nebulizer solution Inhale 1 each 4 (four) times a day as needed     • multivitamin (THERAGRAN) TABS Take 1 tablet by mouth daily   • [DISCONTINUED] atorvastatin (LIPITOR) 20 mg tablet TAKE 2 TABLETS(40 MG) BY MOUTH DAILY   • [DISCONTINUED] lisinopril (ZESTRIL) 10 mg tablet Take 1 tablet (10 mg total) by mouth daily   • ALPRAZolam (XANAX) 0 25 mg tablet Take 1 tablet by mouth 2 (two) times a day as needed (Patient not taking: Reported on 6/21/2021)   • budesonide (PULMICORT) 0 5 mg/2 mL nebulizer solution EMPTY ONE RESPULE INTO YOUR SINUS RINSE BOTTLE AND LAVAGE NOSTRILS BID (Patient not taking: No sig reported)   • denosumab (PROLIA) 60 mg/mL Inject 1 mL (60 mg total) under the skin once for 1 dose   • fluticasone-vilanterol 200-25 mcg/actuation inhaler INHALE 1 PUFF BY MOUTH DAILY   RINSE MOUTH AFTER USE (Patient not taking: Reported on 6/28/2023)   • metoprolol succinate (TOPROL-XL) 50 mg 24 hr tablet Take 1 tablet (50 mg total) by mouth daily (Patient not taking: Reported on 6/28/2023)   • ondansetron (ZOFRAN) 4 mg tablet Take 1 tablet (4 mg total) by mouth every 8 (eight) hours as needed for nausea or vomiting (Patient not "taking: Reported on 6/23/2020)   • pantoprazole (PROTONIX) 40 mg tablet TAKE 1 TABLET(40 MG) BY MOUTH DAILY (Patient not taking: Reported on 12/28/2022)   • predniSONE 10 mg tablet Six p o  for 1 day then 5 p o  for 1 day then 4 p o  for 1 day then 3 po for 1 day and 2 p o  for 1 day then 1 p o  (Patient not taking: Reported on 6/28/2023)       Objective     /78 (BP Location: Right arm, Patient Position: Sitting, Cuff Size: Adult)   Pulse 80   Temp 98 5 °F (36 9 °C)   Ht 5' 5\" (1 651 m)   Wt 77 2 kg (170 lb 3 2 oz)   SpO2 97%   BMI 28 32 kg/m²     Physical Exam  Vitals and nursing note reviewed  Constitutional:       General: She is not in acute distress  Appearance: She is well-developed  She is not diaphoretic  HENT:      Head: Normocephalic and atraumatic  Eyes:      General:         Right eye: No discharge  Conjunctiva/sclera: Conjunctivae normal       Pupils: Pupils are equal, round, and reactive to light  Neck:      Thyroid: No thyromegaly  Cardiovascular:      Rate and Rhythm: Normal rate and regular rhythm  Pulmonary:      Effort: Pulmonary effort is normal  No respiratory distress  Breath sounds: Normal breath sounds  Musculoskeletal:      Cervical back: Normal range of motion  Lymphadenopathy:      Cervical: No cervical adenopathy  Skin:     General: Skin is warm and dry  Neurological:      Mental Status: She is alert and oriented to person, place, and time  Psychiatric:         Behavior: Behavior normal          Thought Content:  Thought content normal          Judgment: Judgment normal        Delories Bombard, DO  "

## 2023-07-02 DIAGNOSIS — J32.9 CHRONIC SINUSITIS, UNSPECIFIED LOCATION: ICD-10-CM

## 2023-07-03 RX ORDER — FLUTICASONE FUROATE AND VILANTEROL TRIFENATATE 200; 25 UG/1; UG/1
POWDER RESPIRATORY (INHALATION)
Qty: 180 EACH | Refills: 0 | Status: SHIPPED | OUTPATIENT
Start: 2023-07-03

## 2023-09-30 DIAGNOSIS — J32.9 CHRONIC SINUSITIS, UNSPECIFIED LOCATION: ICD-10-CM

## 2023-10-02 RX ORDER — FLUTICASONE FUROATE AND VILANTEROL TRIFENATATE 200; 25 UG/1; UG/1
POWDER RESPIRATORY (INHALATION)
Qty: 180 EACH | Refills: 0 | Status: SHIPPED | OUTPATIENT
Start: 2023-10-02

## 2023-12-19 ENCOUNTER — RA CDI HCC (OUTPATIENT)
Dept: OTHER | Facility: HOSPITAL | Age: 83
End: 2023-12-19

## 2023-12-19 ENCOUNTER — TELEPHONE (OUTPATIENT)
Dept: FAMILY MEDICINE CLINIC | Facility: CLINIC | Age: 83
End: 2023-12-19

## 2023-12-19 DIAGNOSIS — M81.0 OSTEOPOROSIS WITHOUT CURRENT PATHOLOGICAL FRACTURE, UNSPECIFIED OSTEOPOROSIS TYPE: ICD-10-CM

## 2023-12-19 DIAGNOSIS — E78.2 MIXED HYPERLIPIDEMIA: Primary | ICD-10-CM

## 2023-12-20 LAB
ALBUMIN SERPL-MCNC: 4.4 G/DL (ref 3.6–5.1)
ALBUMIN/GLOB SERPL: 1.8 (CALC) (ref 1–2.5)
ALP SERPL-CCNC: 49 U/L (ref 37–153)
ALT SERPL-CCNC: 17 U/L (ref 6–29)
AST SERPL-CCNC: 26 U/L (ref 10–35)
BILIRUB SERPL-MCNC: 0.7 MG/DL (ref 0.2–1.2)
BUN SERPL-MCNC: 17 MG/DL (ref 7–25)
BUN/CREAT SERPL: ABNORMAL (CALC) (ref 6–22)
CALCIUM SERPL-MCNC: 9.3 MG/DL (ref 8.6–10.4)
CHLORIDE SERPL-SCNC: 105 MMOL/L (ref 98–110)
CHOLEST SERPL-MCNC: 173 MG/DL
CHOLEST/HDLC SERPL: 2 (CALC)
CO2 SERPL-SCNC: 29 MMOL/L (ref 20–32)
CREAT SERPL-MCNC: 0.65 MG/DL (ref 0.6–0.95)
GFR/BSA.PRED SERPLBLD CYS-BASED-ARV: 87 ML/MIN/1.73M2
GLOBULIN SER CALC-MCNC: 2.5 G/DL (CALC) (ref 1.9–3.7)
GLUCOSE SERPL-MCNC: 101 MG/DL (ref 65–99)
HDLC SERPL-MCNC: 86 MG/DL
LDLC SERPL CALC-MCNC: 73 MG/DL (CALC)
NONHDLC SERPL-MCNC: 87 MG/DL (CALC)
POTASSIUM SERPL-SCNC: 4 MMOL/L (ref 3.5–5.3)
PROT SERPL-MCNC: 6.9 G/DL (ref 6.1–8.1)
SODIUM SERPL-SCNC: 141 MMOL/L (ref 135–146)
TRIGL SERPL-MCNC: 64 MG/DL
TSH SERPL-ACNC: 1.27 MIU/L (ref 0.4–4.5)

## 2023-12-22 ENCOUNTER — OFFICE VISIT (OUTPATIENT)
Dept: FAMILY MEDICINE CLINIC | Facility: CLINIC | Age: 83
End: 2023-12-22
Payer: MEDICARE

## 2023-12-22 VITALS
TEMPERATURE: 97.7 F | DIASTOLIC BLOOD PRESSURE: 84 MMHG | OXYGEN SATURATION: 98 % | HEART RATE: 75 BPM | HEIGHT: 65 IN | WEIGHT: 167.6 LBS | SYSTOLIC BLOOD PRESSURE: 138 MMHG | BODY MASS INDEX: 27.92 KG/M2

## 2023-12-22 DIAGNOSIS — M25.561 CHRONIC PAIN OF RIGHT KNEE: ICD-10-CM

## 2023-12-22 DIAGNOSIS — K21.00 GASTROESOPHAGEAL REFLUX DISEASE WITH ESOPHAGITIS WITHOUT HEMORRHAGE: ICD-10-CM

## 2023-12-22 DIAGNOSIS — R26.89 BALANCE DISORDER: ICD-10-CM

## 2023-12-22 DIAGNOSIS — J45.30 MILD PERSISTENT ASTHMA WITHOUT COMPLICATION: ICD-10-CM

## 2023-12-22 DIAGNOSIS — Z78.0 MENOPAUSE: ICD-10-CM

## 2023-12-22 DIAGNOSIS — I10 BENIGN ESSENTIAL HYPERTENSION: Primary | ICD-10-CM

## 2023-12-22 DIAGNOSIS — E78.2 MIXED HYPERLIPIDEMIA: ICD-10-CM

## 2023-12-22 DIAGNOSIS — M81.6 LOCALIZED OSTEOPOROSIS WITHOUT CURRENT PATHOLOGICAL FRACTURE: ICD-10-CM

## 2023-12-22 DIAGNOSIS — G89.29 CHRONIC PAIN OF RIGHT KNEE: ICD-10-CM

## 2023-12-22 PROCEDURE — 99214 OFFICE O/P EST MOD 30 MIN: CPT | Performed by: FAMILY MEDICINE

## 2023-12-22 NOTE — ASSESSMENT & PLAN NOTE
Continue atorvastatin 40 mg daily.  Lipids well-controlled with LDL cholesterol less than 100 and HDL greater than 50

## 2023-12-22 NOTE — PROGRESS NOTES
Name: Mary Mckenna      : 1940      MRN: [id number]  Encounter Provider: Tung Jaramillo DO  Encounter Date: 2023   Encounter department: [facility]    Assessment & Plan     1. Benign essential hypertension  Assessment & Plan:  Blood pressure stable on no medication at present.  Follows with cardiology.    Orders:  -     Comprehensive metabolic panel; Future; Expected date: 2024  -     TSH, 3rd generation with Free T4 reflex; Future; Expected date: 2024  -     Ambulatory referral to Physical Therapy; Future    2. Balance disorder  -     Ambulatory referral to Physical Therapy; Future    3. Chronic pain of right knee    4. Menopause  -     DXA bone density spine hip and pelvis; Future; Expected date: 2023    5. Localized osteoporosis without current pathological fracture  Assessment & Plan:  Continue treatment with Prolia.      6. Mild persistent asthma without complication  Assessment & Plan:  Doing well on Breo inhaler and Dupixent      7. Mixed hyperlipidemia  Assessment & Plan:  Continue atorvastatin 40 mg daily.  Lipids well-controlled with LDL cholesterol less than 100 and HDL greater than 50      8. Gastroesophageal reflux disease with esophagitis without hemorrhage  Assessment & Plan:  Symptoms stable.  Not currently on proton pump inhibitor but has pantoprazole to take if necessary           Subjective      Patient was seen for follow-up of chronic medical problems.  She is being treated for hypertension, hyperlipidemia, osteoporosis, asthma, history of coronary artery disease and non-STEMI.  No complaints at this time.  Will be leaving to go to [place] for several months next week.  Has been going to PT locally and requests new order for PT that she can continue her treatment in [place].      Review of Systems   Constitutional: Negative.    Respiratory: Negative.     Cardiovascular: Negative.    Gastrointestinal: Negative.    Genitourinary:  Negative.    Musculoskeletal:  Positive for arthralgias and gait problem.   Psychiatric/Behavioral: Negative.         Current Outpatient Medications on File Prior to Visit   Medication Sig   • atorvastatin (LIPITOR) 20 mg tablet Take 2 tablets (40 mg total) by mouth daily   • Breo Ellipta 200-25 MCG/ACT inhaler INHALE 1 PUFF BY MOUTH DAILY. RINSE MOUTH AFTER USE   • celecoxib (CeleBREX) 200 mg capsule Take 1 capsule (200 mg total) by mouth daily   • cetirizine (ZyrTEC) 10 mg tablet TK 1 T PO NIGHTLY   • Cholecalciferol 125 MCG (5000 UT) capsule Take 2,000 Units by mouth daily     • denosumab (PROLIA) 60 mg/mL Inject 1 mL (60 mg total) under the skin once for 1 dose   • Dupixent 300 MG/2ML SOPN every 14 (fourteen) days   • fluticasone (FLONASE) 50 mcg/act nasal spray 2 sprays into each nostril daily Shake liquid and spray   • lisinopril (ZESTRIL) 10 mg tablet Take 1 tablet (10 mg total) by mouth daily   • multivitamin (THERAGRAN) TABS Take 1 tablet by mouth daily   • albuterol (PROVENTIL HFA,VENTOLIN HFA) 90 mcg/act inhaler INHALE 2 PUFFS BY MOUTH EVERY 4 HOURS AS NEEDED FOR WHEEZING (Patient not taking: Reported on 12/22/2023)   • ALPRAZolam (XANAX) 0.25 mg tablet Take 1 tablet by mouth 2 (two) times a day as needed (Patient not taking: Reported on 6/21/2021)   • aspirin (ECOTRIN LOW STRENGTH) 81 mg EC tablet Take 1 tablet by mouth daily (Patient not taking: Reported on 12/22/2023)   • budesonide (PULMICORT) 0.5 mg/2 mL nebulizer solution EMPTY ONE RESPULE INTO YOUR SINUS RINSE BOTTLE AND LAVAGE NOSTRILS BID (Patient not taking: No sig reported)   • fluticasone-vilanterol 200-25 mcg/actuation inhaler Inhale 1 puff daily Rinse mouth after use.   • Glucosamine-Chondroitin 500-400 MG CAPS Take 1 tablet by mouth daily (Patient not taking: Reported on 12/22/2023)   • ipratropium (ATROVENT) 0.02 % nebulizer solution Inhale 1 each 4 (four) times a day as needed   (Patient not taking: Reported on 12/22/2023)   • metoprolol  "succinate (TOPROL-XL) 50 mg 24 hr tablet Take 1 tablet (50 mg total) by mouth daily (Patient not taking: Reported on 12/22/2023)   • ondansetron (ZOFRAN) 4 mg tablet Take 1 tablet (4 mg total) by mouth every 8 (eight) hours as needed for nausea or vomiting (Patient not taking: Reported on 6/23/2020)   • pantoprazole (PROTONIX) 40 mg tablet TAKE 1 TABLET(40 MG) BY MOUTH DAILY (Patient not taking: Reported on 12/28/2022)   • predniSONE 10 mg tablet Six p.o. for 1 day then 5 p.o. for 1 day then 4 p.o. for 1 day then 3 po for 1 day and 2 p.o. for 1 day then 1 p.o. (Patient not taking: Reported on 6/28/2023)       Objective     /84   Pulse 75   Temp 97.7 °F (36.5 °C)   Ht 5' 5.25\" (1.657 m)   Wt 76 kg (167 lb 9.6 oz)   SpO2 98%   BMI 27.68 kg/m²     Physical Exam  Vitals and nursing note reviewed.   Constitutional:       General: She is not in acute distress.     Appearance: Normal appearance. She is well-developed. She is not diaphoretic.   HENT:      Head: Normocephalic and atraumatic.   Eyes:      General:         Right eye: No discharge.      Conjunctiva/sclera: Conjunctivae normal.      Pupils: Pupils are equal, round, and reactive to light.   Neck:      Thyroid: No thyromegaly.   Cardiovascular:      Rate and Rhythm: Normal rate and regular rhythm.   Pulmonary:      Effort: Pulmonary effort is normal. No respiratory distress.      Breath sounds: Normal breath sounds.   Musculoskeletal:      Cervical back: Normal range of motion.   Lymphadenopathy:      Cervical: No cervical adenopathy.   Skin:     General: Skin is warm and dry.   Neurological:      Mental Status: She is alert and oriented to person, place, and time.   Psychiatric:         Mood and Affect: Mood normal.         Behavior: Behavior normal.         Thought Content: Thought content normal.         Judgment: Judgment normal.       Tung Jaramillo, DO    "

## 2023-12-22 NOTE — ASSESSMENT & PLAN NOTE
Symptoms stable.  Not currently on proton pump inhibitor but has pantoprazole to take if necessary

## 2023-12-27 ENCOUNTER — CLINICAL SUPPORT (OUTPATIENT)
Dept: FAMILY MEDICINE CLINIC | Facility: CLINIC | Age: 83
End: 2023-12-27
Payer: MEDICARE

## 2023-12-27 DIAGNOSIS — M81.0 OSTEOPOROSIS WITHOUT CURRENT PATHOLOGICAL FRACTURE, UNSPECIFIED OSTEOPOROSIS TYPE: Primary | ICD-10-CM

## 2023-12-27 PROCEDURE — 96372 THER/PROPH/DIAG INJ SC/IM: CPT

## 2024-01-07 DIAGNOSIS — J32.9 CHRONIC SINUSITIS, UNSPECIFIED LOCATION: ICD-10-CM

## 2024-01-07 DIAGNOSIS — I10 BENIGN ESSENTIAL HYPERTENSION: ICD-10-CM

## 2024-01-08 RX ORDER — FLUTICASONE FUROATE AND VILANTEROL TRIFENATATE 200; 25 UG/1; UG/1
POWDER RESPIRATORY (INHALATION)
Qty: 180 EACH | Refills: 1 | Status: SHIPPED | OUTPATIENT
Start: 2024-01-08

## 2024-01-08 RX ORDER — LISINOPRIL 10 MG/1
10 TABLET ORAL DAILY
Qty: 90 TABLET | Refills: 1 | Status: SHIPPED | OUTPATIENT
Start: 2024-01-08

## 2024-01-11 DIAGNOSIS — E78.2 MIXED HYPERLIPIDEMIA: ICD-10-CM

## 2024-01-11 RX ORDER — ATORVASTATIN CALCIUM 20 MG/1
40 TABLET, FILM COATED ORAL DAILY
Qty: 180 TABLET | Refills: 1 | Status: SHIPPED | OUTPATIENT
Start: 2024-01-11

## 2024-03-16 DIAGNOSIS — M19.90 ARTHRITIS: ICD-10-CM

## 2024-03-18 RX ORDER — CELECOXIB 200 MG/1
CAPSULE ORAL
Qty: 90 CAPSULE | Refills: 0 | Status: SHIPPED | OUTPATIENT
Start: 2024-03-18

## 2024-06-03 DIAGNOSIS — E78.2 MIXED HYPERLIPIDEMIA: ICD-10-CM

## 2024-06-04 RX ORDER — ATORVASTATIN CALCIUM 20 MG/1
40 TABLET, FILM COATED ORAL DAILY
Qty: 180 TABLET | Refills: 1 | Status: SHIPPED | OUTPATIENT
Start: 2024-06-04

## 2024-06-12 DIAGNOSIS — M19.90 ARTHRITIS: ICD-10-CM

## 2024-06-12 RX ORDER — CELECOXIB 200 MG/1
CAPSULE ORAL
Qty: 90 CAPSULE | Refills: 0 | Status: SHIPPED | OUTPATIENT
Start: 2024-06-12

## 2024-06-24 ENCOUNTER — TELEPHONE (OUTPATIENT)
Dept: OTHER | Facility: OTHER | Age: 84
End: 2024-06-24

## 2024-06-24 NOTE — TELEPHONE ENCOUNTER
Patient is calling regarding cancelling an appointment.    Date/Time: 06/25 @230P    Patient was rescheduled: YES [] NO [x]    Patient requesting call back to reschedule: YES [x] NO []

## 2024-06-28 DIAGNOSIS — M81.0 OSTEOPOROSIS WITHOUT CURRENT PATHOLOGICAL FRACTURE, UNSPECIFIED OSTEOPOROSIS TYPE: ICD-10-CM

## 2024-06-28 NOTE — TELEPHONE ENCOUNTER
Pt called to verify if she had an appointment scheduled for her Prolia injection. Script appears to have ended. Pt will wait for script to be sent to Pharmacy b4 scheduling appt. Pt would also like for her routine lab orders to be sent to [facility] in [place].

## 2024-07-02 ENCOUNTER — TELEPHONE (OUTPATIENT)
Dept: FAMILY MEDICINE CLINIC | Facility: CLINIC | Age: 84
End: 2024-07-02

## 2024-07-02 ENCOUNTER — CLINICAL SUPPORT (OUTPATIENT)
Dept: FAMILY MEDICINE CLINIC | Facility: CLINIC | Age: 84
End: 2024-07-02
Payer: MEDICARE

## 2024-07-02 DIAGNOSIS — Z13.0 SCREENING FOR DEFICIENCY ANEMIA: ICD-10-CM

## 2024-07-02 DIAGNOSIS — M81.0 OSTEOPOROSIS WITHOUT CURRENT PATHOLOGICAL FRACTURE, UNSPECIFIED OSTEOPOROSIS TYPE: Primary | ICD-10-CM

## 2024-07-02 DIAGNOSIS — E78.2 MIXED HYPERLIPIDEMIA: Primary | ICD-10-CM

## 2024-07-02 PROCEDURE — 96372 THER/PROPH/DIAG INJ SC/IM: CPT

## 2024-07-02 NOTE — TELEPHONE ENCOUNTER
Pt needs [facility] put in chart, she is going for bw tomorrow. She will be on vacation and has scheduled her AWV for 8/7/24. If you want her to have tsh, change the order from [facility] to [facility] please.

## 2024-07-03 LAB
ALBUMIN SERPL-MCNC: 4.3 G/DL (ref 3.6–5.1)
ALBUMIN/GLOB SERPL: 1.8 (CALC) (ref 1–2.5)
ALP SERPL-CCNC: 50 U/L (ref 37–153)
ALT SERPL-CCNC: 17 U/L (ref 6–29)
AST SERPL-CCNC: 26 U/L (ref 10–35)
BASOPHILS # BLD AUTO: 40 CELLS/UL (ref 0–200)
BASOPHILS NFR BLD AUTO: 0.9 %
BILIRUB SERPL-MCNC: 0.6 MG/DL (ref 0.2–1.2)
BUN SERPL-MCNC: 17 MG/DL (ref 7–25)
BUN/CREAT SERPL: NORMAL (CALC) (ref 6–22)
CALCIUM SERPL-MCNC: 9.5 MG/DL (ref 8.6–10.4)
CHLORIDE SERPL-SCNC: 106 MMOL/L (ref 98–110)
CO2 SERPL-SCNC: 29 MMOL/L (ref 20–32)
CREAT SERPL-MCNC: 0.69 MG/DL (ref 0.6–0.95)
EOSINOPHIL # BLD AUTO: 374 CELLS/UL (ref 15–500)
EOSINOPHIL NFR BLD AUTO: 8.5 %
ERYTHROCYTE [DISTWIDTH] IN BLOOD BY AUTOMATED COUNT: 12.3 % (ref 11–15)
GFR/BSA.PRED SERPLBLD CYS-BASED-ARV: 85 ML/MIN/1.73M2
GLOBULIN SER CALC-MCNC: 2.4 G/DL (CALC) (ref 1.9–3.7)
GLUCOSE SERPL-MCNC: 97 MG/DL (ref 65–99)
HCT VFR BLD AUTO: 36.8 % (ref 35–45)
HGB BLD-MCNC: 12.2 G/DL (ref 11.7–15.5)
LYMPHOCYTES # BLD AUTO: 1148 CELLS/UL (ref 850–3900)
LYMPHOCYTES NFR BLD AUTO: 26.1 %
MCH RBC QN AUTO: 29.8 PG (ref 27–33)
MCHC RBC AUTO-ENTMCNC: 33.2 G/DL (ref 32–36)
MCV RBC AUTO: 90 FL (ref 80–100)
MONOCYTES # BLD AUTO: 524 CELLS/UL (ref 200–950)
MONOCYTES NFR BLD AUTO: 11.9 %
NEUTROPHILS # BLD AUTO: 2314 CELLS/UL (ref 1500–7800)
NEUTROPHILS NFR BLD AUTO: 52.6 %
PLATELET # BLD AUTO: 171 THOUSAND/UL (ref 140–400)
PMV BLD REES-ECKER: 10.3 FL (ref 7.5–12.5)
POTASSIUM SERPL-SCNC: 4.5 MMOL/L (ref 3.5–5.3)
PROT SERPL-MCNC: 6.7 G/DL (ref 6.1–8.1)
RBC # BLD AUTO: 4.09 MILLION/UL (ref 3.8–5.1)
SODIUM SERPL-SCNC: 141 MMOL/L (ref 135–146)
TSH SERPL-ACNC: 1.27 MIU/L (ref 0.4–4.5)
WBC # BLD AUTO: 4.4 THOUSAND/UL (ref 3.8–10.8)

## 2024-07-14 ENCOUNTER — APPOINTMENT (OUTPATIENT)
Dept: RADIOLOGY | Facility: CLINIC | Age: 84
End: 2024-07-14
Payer: MEDICARE

## 2024-07-14 ENCOUNTER — OFFICE VISIT (OUTPATIENT)
Dept: URGENT CARE | Facility: CLINIC | Age: 84
End: 2024-07-14
Payer: MEDICARE

## 2024-07-14 VITALS
WEIGHT: 173 LBS | SYSTOLIC BLOOD PRESSURE: 132 MMHG | OXYGEN SATURATION: 99 % | TEMPERATURE: 97 F | BODY MASS INDEX: 28.57 KG/M2 | HEART RATE: 87 BPM | RESPIRATION RATE: 16 BRPM | DIASTOLIC BLOOD PRESSURE: 92 MMHG

## 2024-07-14 DIAGNOSIS — M25.562 ACUTE PAIN OF LEFT KNEE: ICD-10-CM

## 2024-07-14 DIAGNOSIS — S83.92XA SPRAIN OF LEFT KNEE, UNSPECIFIED LIGAMENT, INITIAL ENCOUNTER: Primary | ICD-10-CM

## 2024-07-14 DIAGNOSIS — J32.9 CHRONIC SINUSITIS, UNSPECIFIED LOCATION: ICD-10-CM

## 2024-07-14 PROCEDURE — 73564 X-RAY EXAM KNEE 4 OR MORE: CPT

## 2024-07-14 PROCEDURE — G0463 HOSPITAL OUTPT CLINIC VISIT: HCPCS | Performed by: NURSE PRACTITIONER

## 2024-07-14 PROCEDURE — 99213 OFFICE O/P EST LOW 20 MIN: CPT | Performed by: NURSE PRACTITIONER

## 2024-07-14 RX ORDER — FLUTICASONE FUROATE AND VILANTEROL TRIFENATATE 200; 25 UG/1; UG/1
POWDER RESPIRATORY (INHALATION)
Qty: 200 EACH | Refills: 1 | Status: SHIPPED | OUTPATIENT
Start: 2024-07-14

## 2024-07-14 NOTE — PROGRESS NOTES
[facility]        NAME: Mary Mckenna is a 84 y.o. female  : 1940    MRN: [id number]  DATE: 2024  TIME: 2:21 PM    Assessment and Plan   Sprain of left knee, unspecified ligament, initial encounter [S83.92XA]  1. Sprain of left knee, unspecified ligament, initial encounter        2. Acute pain of left knee  XR knee 4+ vw left injury    Ambulatory Referral to Orthopedic Surgery        Acute symptomatic wet read x-ray negative will Heed radiology read.  Educated patient to continue with brace as needed (provided new hinge brace in office today), ice/elevate as tolerates, continue Celebrex as prescribed, and follow-up with orthopedics as needed will place referral.  With any worsening of symptoms should follow-up with orthopedics/PCP ASAP    Patient Instructions       Follow up with PCP in 3-5 days.  Proceed to  ER if symptoms worsen.    If tests have been performed at [facility], our office will contact you with results if changes need to be made to the care plan discussed with you at the visit.  [facility]    Chief Complaint     Chief Complaint   Patient presents with   • Knee Pain     Here for L sided knee pain. Pt reports her L knee gave out yesterday & pt having pain since.          History of Present Illness       Patient is an 84-year-old female arrives with complaints of left knee pain.  She reports when she was away in [place] she was working very hard packing stuff up getting ready to move and on her way home they stopped at a restaurant.  She reports when she came out from the bathroom she was walking out of the restaurant when her knee gave out.  She reports she did not impact the knee onto the ground-so there is no actual trauma to the area.  She denies any abrasion laceration redness swelling warmth or bruising present.  Intermittent pain at the time was located at the PCL site.  Since then she reports much improvement in symptoms  however she is concerned that this may happen again she is requesting an x-ray.  At this point she has no swelling no bruising full active range of motion and no pain on palpation.  She does report she has been using a brace and icing it and she does take Celebrex daily however she did not take it yesterday.  She has a past medical history of arthritis.        Review of Systems   Review of Systems   Constitutional:  Negative for activity change, appetite change, chills, fatigue and fever.   HENT:  Negative for congestion, postnasal drip, rhinorrhea, sneezing and sore throat.    Respiratory:  Negative for cough, chest tightness, shortness of breath and wheezing.    Cardiovascular:  Negative for chest pain and palpitations.   Gastrointestinal:  Negative for abdominal pain, constipation, diarrhea, nausea and vomiting.   Musculoskeletal:  Positive for arthralgias. Negative for joint swelling and myalgias.   Skin:  Negative for color change, pallor and rash.   Neurological:  Negative for dizziness, weakness, light-headedness and headaches.   Hematological:  Negative for adenopathy.   Psychiatric/Behavioral:  Negative for agitation and confusion.          Current Medications       Current Outpatient Medications:   •  albuterol (PROVENTIL HFA,VENTOLIN HFA) 90 mcg/act inhaler, INHALE 2 PUFFS BY MOUTH EVERY 4 HOURS AS NEEDED FOR WHEEZING (Patient not taking: Reported on 12/22/2023), Disp: 25.5 g, Rfl: 1  •  ALPRAZolam (XANAX) 0.25 mg tablet, Take 1 tablet by mouth 2 (two) times a day as needed (Patient not taking: Reported on 6/21/2021), Disp: , Rfl:   •  aspirin (ECOTRIN LOW STRENGTH) 81 mg EC tablet, Take 1 tablet by mouth daily (Patient not taking: Reported on 12/22/2023), Disp: , Rfl:   •  atorvastatin (LIPITOR) 20 mg tablet, Take 2 tablets (40 mg total) by mouth daily, Disp: 180 tablet, Rfl: 1  •  Breo Ellipta 200-25 MCG/ACT inhaler, INHALE 1 PUFF BY MOUTH DAILY. RINSE MOUTH AFTER USE, Disp: 180 each, Rfl: 1  •   budesonide (PULMICORT) 0.5 mg/2 mL nebulizer solution, EMPTY ONE RESPULE INTO YOUR SINUS RINSE BOTTLE AND LAVAGE NOSTRILS BID (Patient not taking: No sig reported), Disp: , Rfl: 3  •  celecoxib (CeleBREX) 200 mg capsule, TAKE 1 CAPSULE(200 MG) BY MOUTH DAILY, Disp: 90 capsule, Rfl: 0  •  cetirizine (ZyrTEC) 10 mg tablet, TK 1 T PO NIGHTLY, Disp: , Rfl: 3  •  Cholecalciferol 125 MCG (5000 UT) capsule, Take 2,000 Units by mouth daily  , Disp: , Rfl:   •  denosumab (PROLIA) 60 mg/mL, Inject 1 mL (60 mg total) under the skin once for 1 dose, Disp: 1 mL, Rfl: 0  •  Dupixent 300 MG/2ML SOPN, every 14 (fourteen) days, Disp: , Rfl:   •  fluticasone (FLONASE) 50 mcg/act nasal spray, 2 sprays into each nostril daily Shake liquid and spray, Disp: 48 g, Rfl: 3  •  fluticasone-vilanterol 200-25 mcg/actuation inhaler, Inhale 1 puff daily Rinse mouth after use., Disp: 60 blister, Rfl: 5  •  Glucosamine-Chondroitin 500-400 MG CAPS, Take 1 tablet by mouth daily (Patient not taking: Reported on 12/22/2023), Disp: , Rfl:   •  ipratropium (ATROVENT) 0.02 % nebulizer solution, Inhale 1 each 4 (four) times a day as needed   (Patient not taking: Reported on 12/22/2023), Disp: , Rfl:   •  lisinopril (ZESTRIL) 10 mg tablet, TAKE 1 TABLET(10 MG) BY MOUTH DAILY, Disp: 90 tablet, Rfl: 1  •  metoprolol succinate (TOPROL-XL) 50 mg 24 hr tablet, Take 1 tablet (50 mg total) by mouth daily (Patient not taking: Reported on 12/22/2023), Disp: 90 tablet, Rfl: 1  •  multivitamin (THERAGRAN) TABS, Take 1 tablet by mouth daily, Disp: , Rfl:   •  ondansetron (ZOFRAN) 4 mg tablet, Take 1 tablet (4 mg total) by mouth every 8 (eight) hours as needed for nausea or vomiting (Patient not taking: Reported on 6/23/2020), Disp: 20 tablet, Rfl: 0  •  pantoprazole (PROTONIX) 40 mg tablet, TAKE 1 TABLET(40 MG) BY MOUTH DAILY (Patient not taking: Reported on 12/28/2022), Disp: 90 tablet, Rfl: 1  •  predniSONE 10 mg tablet, Six p.o. for 1 day then 5 p.o. for 1 day then  4 p.o. for 1 day then 3 po for 1 day and 2 p.o. for 1 day then 1 p.o. (Patient not taking: Reported on 6/28/2023), Disp: 21 tablet, Rfl: 0    Current Allergies     Allergies as of 07/14/2024 - Reviewed 07/14/2024   Allergen Reaction Noted   • Cat hair extract  08/28/2015   • Molds & smuts  08/28/2015   • Other  05/19/2016   • Pollen extract  08/28/2015            The following portions of the patient's history were reviewed and updated as appropriate: allergies, current medications, past family history, past medical history, past social history, past surgical history and problem list.     Past Medical History:   Diagnosis Date   • Acute systolic CHF (congestive heart failure) (HCC) 12/20/2017    Last Assessment & Plan:  Euvolemic. NYHA class II. Change lopressor to toprol. On ACEI. Echo 40 days post ordered - she will be in [place] by then. We discussed if LVEF remains reduced (echo originally questioned stress induced CM) that goal  is to optimize medical therapy with ACEI and BB. If after three months of medical therapy, he LVEF remains reduced, AICD may be recommended. I stated it is concer   • Allergic    • Allergic rhinitis     Last Assessed:5/21/2015   • Benign essential hypertension     Last Assessed:4/24/2014   • Hypertension    • Tachycardia     Last Assessed:12/22/2012       Past Surgical History:   Procedure Laterality Date   • CARDIAC SURGERY  2017   • HYSTERECTOMY     • OOPHORECTOMY Bilateral     age 49   • THYROIDECTOMY, PARTIAL  2016   • US GUIDED THYROID BIOPSY  10/26/2015       Family History   Problem Relation Age of Onset   • Breast cancer Mother 52   • Heart attack Father    • Melanoma Daughter 18   • No Known Problems Sister    • No Known Problems Sister    • No Known Problems Maternal Aunt    • No Known Problems Maternal Grandmother    • No Known Problems Maternal Grandfather    • No Known Problems Paternal Grandmother    • No Known Problems Paternal Grandfather    • BRCA1 Negative Sister    •  Breast cancer Maternal Aunt         in 60's   • No Known Problems Paternal Aunt          Medications have been verified.        Objective   /92   Pulse 87   Temp (!) 97 °F (36.1 °C) (Tympanic)   Resp 16   Wt 78.5 kg (173 lb)   SpO2 99%   BMI 28.57 kg/m²   No LMP recorded. Patient is postmenopausal.       Physical Exam     Physical Exam  Vitals and nursing note reviewed.   Constitutional:       General: She is not in acute distress.     Appearance: Normal appearance. She is not ill-appearing or diaphoretic.   HENT:      Head: Normocephalic and atraumatic.      Nose: No congestion or rhinorrhea.   Eyes:      General: No scleral icterus.        Right eye: No discharge.         Left eye: No discharge.   Pulmonary:      Effort: Pulmonary effort is normal. No respiratory distress.   Musculoskeletal:         General: Signs of injury present. No swelling. Normal range of motion.      Cervical back: Normal range of motion.      Left knee: No swelling, effusion, erythema or bony tenderness. Normal range of motion. Tenderness present over the PCL. No medial joint line or lateral joint line tenderness. Normal pulse.   Skin:     Coloration: Skin is not jaundiced or pale.      Findings: No bruising, erythema or rash.   Neurological:      General: No focal deficit present.      Mental Status: She is alert and oriented to person, place, and time.   Psychiatric:         Mood and Affect: Mood normal.         Behavior: Behavior normal.         Thought Content: Thought content normal.         Judgment: Judgment normal.

## 2024-07-25 ENCOUNTER — OFFICE VISIT (OUTPATIENT)
Dept: OBGYN CLINIC | Facility: MEDICAL CENTER | Age: 84
End: 2024-07-25
Payer: MEDICARE

## 2024-07-25 ENCOUNTER — RA CDI HCC (OUTPATIENT)
Dept: OTHER | Facility: HOSPITAL | Age: 84
End: 2024-07-25

## 2024-07-25 VITALS
BODY MASS INDEX: 28.82 KG/M2 | WEIGHT: 173 LBS | HEART RATE: 68 BPM | SYSTOLIC BLOOD PRESSURE: 178 MMHG | DIASTOLIC BLOOD PRESSURE: 100 MMHG | HEIGHT: 65 IN

## 2024-07-25 DIAGNOSIS — M17.12 PRIMARY OSTEOARTHRITIS OF LEFT KNEE: Primary | ICD-10-CM

## 2024-07-25 DIAGNOSIS — M25.562 ACUTE PAIN OF LEFT KNEE: ICD-10-CM

## 2024-07-25 PROBLEM — Z92.89: Status: RESOLVED | Noted: 2020-06-23 | Resolved: 2024-07-25

## 2024-07-25 PROCEDURE — 99203 OFFICE O/P NEW LOW 30 MIN: CPT | Performed by: EMERGENCY MEDICINE

## 2024-07-25 PROCEDURE — 20610 DRAIN/INJ JOINT/BURSA W/O US: CPT | Performed by: EMERGENCY MEDICINE

## 2024-07-25 RX ORDER — ROPIVACAINE HYDROCHLORIDE 2 MG/ML
4 INJECTION, SOLUTION EPIDURAL; INFILTRATION; PERINEURAL
Status: SHIPPED | OUTPATIENT
Start: 2024-07-25

## 2024-07-25 RX ORDER — TRIAMCINOLONE ACETONIDE 40 MG/ML
40 INJECTION, SUSPENSION INTRA-ARTICULAR; INTRAMUSCULAR
Status: COMPLETED | OUTPATIENT
Start: 2024-07-25 | End: 2024-07-25

## 2024-07-25 RX ADMIN — TRIAMCINOLONE ACETONIDE 40 MG: 40 INJECTION, SUSPENSION INTRA-ARTICULAR; INTRAMUSCULAR at 14:30

## 2024-07-25 RX ADMIN — ROPIVACAINE HYDROCHLORIDE 4 ML: 2 INJECTION, SOLUTION EPIDURAL; INFILTRATION; PERINEURAL at 15:25

## 2024-07-25 NOTE — PROGRESS NOTES
Assessment/Plan:    Diagnoses and all orders for this visit:    Primary osteoarthritis of left knee  -     Ambulatory Referral to Physical Therapy; Future  -     Large joint arthrocentesis: L knee  -     ropivacaine (NAROPIN) injection 4 mL    Acute pain of left knee  -     Ambulatory Referral to Orthopedic Surgery  -     Ambulatory Referral to Physical Therapy; Future  -     Large joint arthrocentesis: L knee  -     ropivacaine (NAROPIN) injection 4 mL    Left knee CSI provided today as requested by patient.    Reviewed Xrays showing DJD  Referred to PT, as she has participated for her Right knee    Return in about 3 months (around 10/25/2024).      Subjective:   Patient ID: Mary Mckenna is a 84 y.o. female.    NP presents for Left knee pain after her knee gave out on her, but notes improved symptoms overall.    She has a little difficulty going up stairs, but is mostly concerned about her knee giving out on her again  Xrays Knee were obtained and reviewed today        Review of Systems    The following portions of the patient's chart were reviewed and updated as appropriate:   Allergy:    Allergies   Allergen Reactions    Cat Hair Extract     Molds & Smuts     Other     Pollen Extract        Medications:    Current Outpatient Medications:     atorvastatin (LIPITOR) 20 mg tablet, Take 2 tablets (40 mg total) by mouth daily, Disp: 180 tablet, Rfl: 1    Breo Ellipta 200-25 MCG/ACT inhaler, INHALE 1 PUFF BY MOUTH DAILY. RINSE MOUTH AFTER USE, Disp: 200 each, Rfl: 1    celecoxib (CeleBREX) 200 mg capsule, TAKE 1 CAPSULE(200 MG) BY MOUTH DAILY, Disp: 90 capsule, Rfl: 0    cetirizine (ZyrTEC) 10 mg tablet, TK 1 T PO NIGHTLY, Disp: , Rfl: 3    Cholecalciferol 125 MCG (5000 UT) capsule, Take 2,000 Units by mouth daily  , Disp: , Rfl:     Dupixent 300 MG/2ML SOPN, every 14 (fourteen) days, Disp: , Rfl:     fluticasone (FLONASE) 50 mcg/act nasal spray, 2 sprays into each nostril daily Shake liquid and spray, Disp: 48  g, Rfl: 3    lisinopril (ZESTRIL) 10 mg tablet, TAKE 1 TABLET(10 MG) BY MOUTH DAILY, Disp: 90 tablet, Rfl: 1    multivitamin (THERAGRAN) TABS, Take 1 tablet by mouth daily, Disp: , Rfl:     albuterol (PROVENTIL HFA,VENTOLIN HFA) 90 mcg/act inhaler, INHALE 2 PUFFS BY MOUTH EVERY 4 HOURS AS NEEDED FOR WHEEZING (Patient not taking: Reported on 12/22/2023), Disp: 25.5 g, Rfl: 1    ALPRAZolam (XANAX) 0.25 mg tablet, Take 1 tablet by mouth 2 (two) times a day as needed (Patient not taking: Reported on 6/21/2021), Disp: , Rfl:     aspirin (ECOTRIN LOW STRENGTH) 81 mg EC tablet, Take 1 tablet by mouth daily (Patient not taking: Reported on 12/22/2023), Disp: , Rfl:     budesonide (PULMICORT) 0.5 mg/2 mL nebulizer solution, EMPTY ONE RESPULE INTO YOUR SINUS RINSE BOTTLE AND LAVAGE NOSTRILS BID (Patient not taking: No sig reported), Disp: , Rfl: 3    denosumab (PROLIA) 60 mg/mL, Inject 1 mL (60 mg total) under the skin once for 1 dose, Disp: 1 mL, Rfl: 0    fluticasone-vilanterol 200-25 mcg/actuation inhaler, Inhale 1 puff daily Rinse mouth after use., Disp: 60 blister, Rfl: 5    Glucosamine-Chondroitin 500-400 MG CAPS, Take 1 tablet by mouth daily (Patient not taking: Reported on 12/22/2023), Disp: , Rfl:     ipratropium (ATROVENT) 0.02 % nebulizer solution, Inhale 1 each 4 (four) times a day as needed   (Patient not taking: Reported on 12/22/2023), Disp: , Rfl:     metoprolol succinate (TOPROL-XL) 50 mg 24 hr tablet, Take 1 tablet (50 mg total) by mouth daily (Patient not taking: Reported on 12/22/2023), Disp: 90 tablet, Rfl: 1    ondansetron (ZOFRAN) 4 mg tablet, Take 1 tablet (4 mg total) by mouth every 8 (eight) hours as needed for nausea or vomiting (Patient not taking: Reported on 6/23/2020), Disp: 20 tablet, Rfl: 0    pantoprazole (PROTONIX) 40 mg tablet, TAKE 1 TABLET(40 MG) BY MOUTH DAILY (Patient not taking: Reported on 12/28/2022), Disp: 90 tablet, Rfl: 1    predniSONE 10 mg tablet, Six p.o. for 1 day then 5 p.o.  "for 1 day then 4 p.o. for 1 day then 3 po for 1 day and 2 p.o. for 1 day then 1 p.o. (Patient not taking: Reported on 6/28/2023), Disp: 21 tablet, Rfl: 0    Current Facility-Administered Medications:     ropivacaine (NAROPIN) injection 4 mL, 4 mL, Intra-articular, Titrated, , 4 mL at 07/25/24 1525    Patient Active Problem List   Diagnosis    Abnormality of esophagus    Malignant neoplasm of ovary (HCC)    Anxiety    Asthma    Benign essential hypertension    Chronic sinusitis    Coronary artery disease involving native coronary artery of native heart without angina pectoris    Environmental and seasonal allergies    Gastroesophageal reflux disease with esophagitis    Ground glass opacity present on imaging of lung    Hyperlipidemia    Hyperthyroidism    Lung nodule    Mild persistent asthma without complication    Multinodular non-toxic goiter    NSTEMI (non-ST elevated myocardial infarction) (HCC)    Osteoarthritis    Osteopenia    Pancreatic cyst    Pulmonary nodule, right    Vitamin D deficiency    Localized osteoporosis without current pathological fracture       Objective:  BP (!) 178/100   Pulse 68   Ht 5' 5.25\" (1.657 m)   Wt 78.5 kg (173 lb)   BMI 28.57 kg/m²     Left Knee Exam     Tenderness   The patient is experiencing no tenderness.     Tests   Lachman:      Posterior - negative    Other   Erythema: absent  Sensation: normal  Swelling: mild  Effusion: effusion present    Comments:  Slight extension lag b/l knees which are equal          Observations   Left Knee   Positive for effusion.       Physical Exam  Musculoskeletal:      Left knee: Effusion present.           Neurologic Exam    Large joint arthrocentesis: L knee  Universal Protocol:  Consent: Verbal consent obtained.  Risks and benefits: risks, benefits and alternatives were discussed  Consent given by: patient  Time out: Immediately prior to procedure a \"time out\" was called to verify the correct patient, procedure, equipment, support staff " and site/side marked as required.  Timeout called at: 7/25/2024 3:22 PM.  Patient understanding: patient states understanding of the procedure being performed  Test results: test results available and properly labeled  Site marked: the operative site was marked  Patient identity confirmed: verbally with patient  Supporting Documentation  Indications: pain   Procedure Details  Location: knee - L knee  Preparation: Patient was prepped and draped in the usual sterile fashion  Needle size: 22 G  Ultrasound guidance: no  Approach: anterolateral  Medications administered: 40 mg triamcinolone acetonide 40 mg/mL    Patient tolerance: patient tolerated the procedure well with no immediate complications  Dressing:  Sterile dressing applied    No erythema of knee(s)        I have personally reviewed pertinent films in PACS. and I have personally reviewed the written report of the pertinent studies.             Past Medical History:   Diagnosis Date    Acute systolic CHF (congestive heart failure) (HCC) 12/20/2017    Last Assessment & Plan:  Euvolemic. NYHA class II. Change lopressor to toprol. On ACEI. Echo 40 days post ordered - she will be in [place] by then. We discussed if LVEF remains reduced (echo originally questioned stress induced CM) that goal  is to optimize medical therapy with ACEI and BB. If after three months of medical therapy, he LVEF remains reduced, AICD may be recommended. I stated it is concer    Allergic     Allergic rhinitis     Last Assessed:5/21/2015    Benign essential hypertension     Last Assessed:4/24/2014    Hypertension     Personal history of medical treatment 06/23/2020    Tachycardia     Last Assessed:12/22/2012       Past Surgical History:   Procedure Laterality Date    CARDIAC SURGERY  2017    HYSTERECTOMY      OOPHORECTOMY Bilateral     age 49    THYROIDECTOMY, PARTIAL  2016    US GUIDED THYROID BIOPSY  10/26/2015       Social History     Socioeconomic History    Marital status: /Civil  Union     Spouse name: Not on file    Number of children: Not on file    Years of education: Not on file    Highest education level: Not on file   Occupational History    Not on file   Tobacco Use    Smoking status: Never    Smokeless tobacco: Never   Vaping Use    Vaping status: Never Used   Substance and Sexual Activity    Alcohol use: Not Currently     Comment: occasional-rare    Drug use: No    Sexual activity: Not Currently     Partners: Male   Other Topics Concern    Not on file   Social History Narrative    Not on file     Social Determinants of Health     Financial Resource Strain: Low Risk  (12/28/2022)    Overall Financial Resource Strain (CARDIA)     Difficulty of Paying Living Expenses: Not hard at all   Food Insecurity: Not on file   Transportation Needs: No Transportation Needs (12/28/2022)    PRAPARE - Transportation     Lack of Transportation (Medical): No     Lack of Transportation (Non-Medical): No   Physical Activity: Not on file   Stress: Not on file   Social Connections: Not on file   Intimate Partner Violence: Not on file   Housing Stability: Not on file       Family History   Problem Relation Age of Onset    Breast cancer Mother 52    Heart attack Father     Melanoma Daughter 18    No Known Problems Sister     No Known Problems Sister     No Known Problems Maternal Aunt     No Known Problems Maternal Grandmother     No Known Problems Maternal Grandfather     No Known Problems Paternal Grandmother     No Known Problems Paternal Grandfather     BRCA1 Negative Sister     Breast cancer Maternal Aunt         in 60's    No Known Problems Paternal Aunt

## 2024-08-05 ENCOUNTER — HOSPITAL ENCOUNTER (OUTPATIENT)
Dept: BONE DENSITY | Facility: MEDICAL CENTER | Age: 84
Discharge: HOME | End: 2024-08-05
Payer: MEDICARE

## 2024-08-05 DIAGNOSIS — Z78.0 MENOPAUSE: ICD-10-CM

## 2024-08-05 PROCEDURE — 77080 DXA BONE DENSITY AXIAL: CPT

## 2024-08-06 DIAGNOSIS — I10 BENIGN ESSENTIAL HYPERTENSION: ICD-10-CM

## 2024-08-07 ENCOUNTER — OFFICE VISIT (OUTPATIENT)
Dept: FAMILY MEDICINE CLINIC | Facility: CLINIC | Age: 84
End: 2024-08-07
Payer: MEDICARE

## 2024-08-07 VITALS
HEIGHT: 65 IN | TEMPERATURE: 46.8 F | SYSTOLIC BLOOD PRESSURE: 126 MMHG | BODY MASS INDEX: 28.99 KG/M2 | WEIGHT: 174 LBS | HEART RATE: 72 BPM | OXYGEN SATURATION: 99 % | DIASTOLIC BLOOD PRESSURE: 84 MMHG

## 2024-08-07 DIAGNOSIS — C56.9 MALIGNANT NEOPLASM OF OVARY, UNSPECIFIED LATERALITY (HCC): ICD-10-CM

## 2024-08-07 DIAGNOSIS — I25.10 CORONARY ARTERY DISEASE INVOLVING NATIVE CORONARY ARTERY OF NATIVE HEART WITHOUT ANGINA PECTORIS: ICD-10-CM

## 2024-08-07 DIAGNOSIS — I10 BENIGN ESSENTIAL HYPERTENSION: ICD-10-CM

## 2024-08-07 DIAGNOSIS — Z00.00 MEDICARE ANNUAL WELLNESS VISIT, SUBSEQUENT: Primary | ICD-10-CM

## 2024-08-07 DIAGNOSIS — K21.00 GASTROESOPHAGEAL REFLUX DISEASE WITH ESOPHAGITIS WITHOUT HEMORRHAGE: ICD-10-CM

## 2024-08-07 DIAGNOSIS — E05.90 HYPERTHYROIDISM: ICD-10-CM

## 2024-08-07 DIAGNOSIS — I21.4 NSTEMI (NON-ST ELEVATED MYOCARDIAL INFARCTION) (HCC): ICD-10-CM

## 2024-08-07 DIAGNOSIS — E78.2 MIXED HYPERLIPIDEMIA: ICD-10-CM

## 2024-08-07 DIAGNOSIS — M81.6 LOCALIZED OSTEOPOROSIS WITHOUT CURRENT PATHOLOGICAL FRACTURE: ICD-10-CM

## 2024-08-07 PROCEDURE — 99214 OFFICE O/P EST MOD 30 MIN: CPT | Performed by: FAMILY MEDICINE

## 2024-08-07 PROCEDURE — G0439 PPPS, SUBSEQ VISIT: HCPCS | Performed by: FAMILY MEDICINE

## 2024-08-07 PROCEDURE — G0444 DEPRESSION SCREEN ANNUAL: HCPCS | Performed by: FAMILY MEDICINE

## 2024-08-07 RX ORDER — LISINOPRIL 10 MG/1
10 TABLET ORAL DAILY
Qty: 30 TABLET | Refills: 0 | Status: SHIPPED | OUTPATIENT
Start: 2024-08-07

## 2024-08-07 NOTE — ASSESSMENT & PLAN NOTE
Recent DEXA scan shows slight increase in bone density over the last 3 years.  Continue Prolia vitamin D and calcium supplement

## 2024-08-07 NOTE — PATIENT INSTRUCTIONS
Medicare Preventive Visit Patient Instructions  Thank you for completing your Welcome to Medicare Visit or Medicare Annual Wellness Visit today. Your next wellness visit will be due in one year (8/8/2025).  The screening/preventive services that you may require over the next 5-10 years are detailed below. Some tests may not apply to you based off risk factors and/or age. Screening tests ordered at today's visit but not completed yet may show as past due. Also, please note that scanned in results may not display below.  Preventive Screenings:  Service Recommendations Previous Testing/Comments   Colorectal Cancer Screening  * Colonoscopy    * Fecal Occult Blood Test (FOBT)/Fecal Immunochemical Test (FIT)  * Fecal DNA/Cologuard Test  * Flexible Sigmoidoscopy Age: 45-75 years old   Colonoscopy: every 10 years (may be performed more frequently if at higher risk)  OR  FOBT/FIT: every 1 year  OR  Cologuard: every 3 years  OR  Sigmoidoscopy: every 5 years  Screening may be recommended earlier than age 45 if at higher risk for colorectal cancer. Also, an individualized decision between you and your healthcare provider will decide whether screening between the ages of 76-85 would be appropriate. Colonoscopy: Not on file  FOBT/FIT: Not on file  Cologuard: Not on file  Sigmoidoscopy: Not on file    Screening Not Indicated     Breast Cancer Screening Age: 40+ years old  Frequency: every 1-2 years  Not required if history of left and right mastectomy Mammogram: 04/12/2021        Cervical Cancer Screening Between the ages of 21-29, pap smear recommended once every 3 years.   Between the ages of 30-65, can perform pap smear with HPV co-testing every 5 years.   Recommendations may differ for women with a history of total hysterectomy, cervical cancer, or abnormal pap smears in past. Pap Smear: 06/23/2020    Screening Not Indicated   Hepatitis C Screening Once for adults born between 1945 and 1965  More frequently in patients at high  risk for Hepatitis C Hep C Antibody: Not on file        Diabetes Screening 1-2 times per year if you're at risk for diabetes or have pre-diabetes Fasting glucose: No results in last 5 years (No results in last 5 years)  A1C: No results in last 5 years (No results in last 5 years)  Screening Current   Cholesterol Screening Once every 5 years if you don't have a lipid disorder. May order more often based on risk factors. Lipid panel: 12/20/2023    Screening Not Indicated  History Lipid Disorder     Other Preventive Screenings Covered by Medicare:  Abdominal Aortic Aneurysm (AAA) Screening: covered once if your at risk. You're considered to be at risk if you have a family history of AAA.  Lung Cancer Screening: covers low dose CT scan once per year if you meet all of the following conditions: (1) Age 55-77; (2) No signs or symptoms of lung cancer; (3) Current smoker or have quit smoking within the last 15 years; (4) You have a tobacco smoking history of at least 20 pack years (packs per day multiplied by number of years you smoked); (5) You get a written order from a healthcare provider.  Glaucoma Screening: covered annually if you're considered high risk: (1) You have diabetes OR (2) Family history of glaucoma OR (3)  aged 50 and older OR (4)  American aged 65 and older  Osteoporosis Screening: covered every 2 years if you meet one of the following conditions: (1) You're estrogen deficient and at risk for osteoporosis based off medical history and other findings; (2) Have a vertebral abnormality; (3) On glucocorticoid therapy for more than 3 months; (4) Have primary hyperparathyroidism; (5) On osteoporosis medications and need to assess response to drug therapy.   Last bone density test (DXA Scan): 08/05/2024.  HIV Screening: covered annually if you're between the age of 15-65. Also covered annually if you are younger than 15 and older than 65 with risk factors for HIV infection. For pregnant  patients, it is covered up to 3 times per pregnancy.    Immunizations:  Immunization Recommendations   Influenza Vaccine Annual influenza vaccination during flu season is recommended for all persons aged >= 6 months who do not have contraindications   Pneumococcal Vaccine   * Pneumococcal conjugate vaccine = PCV13 (Prevnar 13), PCV15 (Vaxneuvance), PCV20 (Prevnar 20)  * Pneumococcal polysaccharide vaccine = PPSV23 (Pneumovax) Adults 19-65 yo with certain risk factors or if 65+ yo  If never received any pneumonia vaccine: recommend Prevnar 20 (PCV20)  Give PCV20 if previously received 1 dose of PCV13 or PPSV23   Hepatitis B Vaccine 3 dose series if at intermediate or high risk (ex: diabetes, end stage renal disease, liver disease)   Respiratory syncytial virus (RSV) Vaccine - COVERED BY MEDICARE PART D  * RSVPreF3 (Arexvy) CDC recommends that adults 60 years of age and older may receive a single dose of RSV vaccine using shared clinical decision-making (SCDM)   Tetanus (Td) Vaccine - COST NOT COVERED BY MEDICARE PART B Following completion of primary series, a booster dose should be given every 10 years to maintain immunity against tetanus. Td may also be given as tetanus wound prophylaxis.   Tdap Vaccine - COST NOT COVERED BY MEDICARE PART B Recommended at least once for all adults. For pregnant patients, recommended with each pregnancy.   Shingles Vaccine (Shingrix) - COST NOT COVERED BY MEDICARE PART B  2 shot series recommended in those 19 years and older who have or will have weakened immune systems or those 50 years and older     Health Maintenance Due:      Topic Date Due   • DXA SCAN  08/05/2026     Immunizations Due:      Topic Date Due   • COVID-19 Vaccine (4 - 2023-24 season) 09/01/2023   • Influenza Vaccine (1) 09/01/2024     Advance Directives   What are advance directives?  Advance directives are legal documents that state your wishes and plans for medical care. These plans are made ahead of time in  case you lose your ability to make decisions for yourself. Advance directives can apply to any medical decision, such as the treatments you want, and if you want to donate organs.   What are the types of advance directives?  There are many types of advance directives, and each state has rules about how to use them. You may choose a combination of any of the following:  Living will:  This is a written record of the treatment you want. You can also choose which treatments you do not want, which to limit, and which to stop at a certain time. This includes surgery, medicine, IV fluid, and tube feedings.   Durable power of  for healthcare (DPAHC):  This is a written record that states who you want to make healthcare choices for you when you are unable to make them for yourself. This person, called a proxy, is usually a family member or a friend. You may choose more than 1 proxy.  Do not resuscitate (DNR) order:  A DNR order is used in case your heart stops beating or you stop breathing. It is a request not to have certain forms of treatment, such as CPR. A DNR order may be included in other types of advance directives.  Medical directive:  This covers the care that you want if you are in a coma, near death, or unable to make decisions for yourself. You can list the treatments you want for each condition. Treatment may include pain medicine, surgery, blood transfusions, dialysis, IV or tube feedings, and a ventilator (breathing machine).  Values history:  This document has questions about your views, beliefs, and how you feel and think about life. This information can help others choose the care that you would choose.  Why are advance directives important?  An advance directive helps you control your care. Although spoken wishes may be used, it is better to have your wishes written down. Spoken wishes can be misunderstood, or not followed. Treatments may be given even if you do not want them. An advance directive  may make it easier for your family to make difficult choices about your care.   Weight Management   Why it is important to manage your weight:  Being overweight increases your risk of health conditions such as heart disease, high blood pressure, type 2 diabetes, and certain types of cancer. It can also increase your risk for osteoarthritis, sleep apnea, and other respiratory problems. Aim for a slow, steady weight loss. Even a small amount of weight loss can lower your risk of health problems.  How to lose weight safely:  A safe and healthy way to lose weight is to eat fewer calories and get regular exercise. You can lose up about 1 pound a week by decreasing the number of calories you eat by 500 calories each day.   Healthy meal plan for weight management:  A healthy meal plan includes a variety of foods, contains fewer calories, and helps you stay healthy. A healthy meal plan includes the following:  Eat whole-grain foods more often.  A healthy meal plan should contain fiber. Fiber is the part of grains, fruits, and vegetables that is not broken down by your body. Whole-grain foods are healthy and provide extra fiber in your diet. Some examples of whole-grain foods are whole-wheat breads and pastas, oatmeal, brown rice, and bulgur.  Eat a variety of vegetables every day.  Include dark, leafy greens such as spinach, kale, adia greens, and mustard greens. Eat yellow and orange vegetables such as carrots, sweet potatoes, and winter squash.   Eat a variety of fruits every day.  Choose fresh or canned fruit (canned in its own juice or light syrup) instead of juice. Fruit juice has very little or no fiber.  Eat low-fat dairy foods.  Drink fat-free (skim) milk or 1% milk. Eat fat-free yogurt and low-fat cottage cheese. Try low-fat cheeses such as mozzarella and other reduced-fat cheeses.  Choose meat and other protein foods that are low in fat.  Choose beans or other legumes such as split peas or lentils. Choose fish,  skinless poultry (chicken or turkey), or lean cuts of red meat (beef or pork). Before you cook meat or poultry, cut off any visible fat.   Use less fat and oil.  Try baking foods instead of frying them. Add less fat, such as margarine, sour cream, regular salad dressing and mayonnaise to foods. Eat fewer high-fat foods. Some examples of high-fat foods include french fries, doughnuts, ice cream, and cakes.  Eat fewer sweets.  Limit foods and drinks that are high in sugar. This includes candy, cookies, regular soda, and sweetened drinks.  Exercise:  Exercise at least 30 minutes per day on most days of the week. Some examples of exercise include walking, biking, dancing, and swimming. You can also fit in more physical activity by taking the stairs instead of the elevator or parking farther away from stores. Ask your healthcare provider about the best exercise plan for you.      © Copyright [employer] 2018 Information is for End User's use only and may not be sold, redistributed or otherwise used for commercial purposes. All illustrations and images included in CareNotes® are the copyrighted property of [employer] or [employer]

## 2024-08-07 NOTE — PROGRESS NOTES
Ambulatory Visit  Name: Mary Mckenna      : 1940      MRN: [id number]  Encounter Provider: Tung Jaramillo DO  Encounter Date: 2024   Encounter department: [facility]    Assessment & Plan   1. Medicare annual wellness visit, subsequent  Assessment & Plan:  Questionnaire reviewed.  Immunization health screening history updated.  Advance directive in place.  2. Malignant neoplasm of ovary, unspecified laterality (HCC)  3. Benign essential hypertension  Assessment & Plan:  Blood pressure stable on lisinopril 10 mg daily  4. Coronary artery disease involving native coronary artery of native heart without angina pectoris  Assessment & Plan:  Asymptomatic.  Continue with high intensity statin, atorvastatin 40 mg daily  5. Gastroesophageal reflux disease with esophagitis without hemorrhage  6. Mixed hyperlipidemia  Assessment & Plan:  Continue atorvastatin 40 mg daily  7. Hyperthyroidism  Assessment & Plan:  TSH normal.  Currently on no medication to manage thyroid concerns  8. NSTEMI (non-ST elevated myocardial infarction) ([facility])  9. Localized osteoporosis without current pathological fracture  Assessment & Plan:  Recent DEXA scan shows slight increase in bone density over the last 3 years.  Continue Prolia vitamin D and calcium supplement      Depression Screening and Follow-up Plan: Patient was screened for depression during today's encounter. They screened negative with a PHQ-2 score of 0.      Preventive health issues were discussed with patient, and age appropriate screening tests were ordered as noted in patient's After Visit Summary. Personalized health advice and appropriate referrals for health education or preventive services given if needed, as noted in patient's After Visit Summary.    History of Present Illness     Patient was seen for Medicare wellness visit and for follow-up of chronic medical problems.  Medical problems include osteoarthritis, osteoporosis, asthma,  hypothyroidism, hyperlipidemia, reflux hypertension.  She has no complaints today other than her arthritis particularly involving her left knee.  She saw orthopedics recently and had an injection several weeks ago which she does not feel has made much of a difference in her symptoms.       Patient Care Team:  Tung Jaramillo DO as PCP - General  Rach Candelaria MD as PCP - OBGYN (Obstetrics and Gynecology)  Rach Candelaria MD    Review of Systems   Constitutional: Negative.    Respiratory: Negative.     Cardiovascular: Negative.    Gastrointestinal: Negative.    Genitourinary: Negative.    Musculoskeletal:  Positive for arthralgias.   Psychiatric/Behavioral: Negative.       Medical History Reviewed by provider this encounter:       Annual Wellness Visit Questionnaire   Mary is here for her Subsequent Wellness visit.     Health Risk Assessment:   Patient rates overall health as good. Patient feels that their physical health rating is same. Patient is satisfied with their life. Eyesight was rated as same. Hearing was rated as same. Patient feels that their emotional and mental health rating is same. Patients states they are never, rarely angry. Patient states they are sometimes unusually tired/fatigued. Pain experienced in the last 7 days has been some. Patient's pain rating has been 5/10. Patient states that she has experienced no weight loss or gain in last 6 months.     Depression Screening:   PHQ-2 Score: 0      Fall Risk Screening:   In the past year, patient has experienced: no history of falling in past year      Urinary Incontinence Screening:   Patient has not leaked urine accidently in the last six months.     Home Safety:  Patient does not have trouble with stairs inside or outside of their home. Patient has working smoke alarms and has working carbon monoxide detector. Home safety hazards include: none.     Nutrition:   Current diet is Regular.     Medications:   Patient is not currently taking any  over-the-counter supplements. Patient is able to manage medications.     Activities of Daily Living (ADLs)/Instrumental Activities of Daily Living (IADLs):   Walk and transfer into and out of bed and chair?: Yes  Dress and groom yourself?: Yes    Bathe or shower yourself?: Yes    Feed yourself? Yes  Do your laundry/housekeeping?: Yes  Manage your money, pay your bills and track your expenses?: Yes  Make your own meals?: Yes    Do your own shopping?: Yes    Previous Hospitalizations:   Any hospitalizations or ED visits within the last 12 months?: No      Advance Care Planning:   Living will: Yes    Durable POA for healthcare: Yes    Advanced directive: Yes      PREVENTIVE SCREENINGS      Cardiovascular Screening:    General: Screening Not Indicated and History Lipid Disorder      Diabetes Screening:     General: Screening Current      Colorectal Cancer Screening:     General: Screening Not Indicated      Cervical Cancer Screening:    General: Screening Not Indicated      Osteoporosis Screening:    General: Screening Not Indicated and History Osteoporosis      Abdominal Aortic Aneurysm (AAA) Screening:        General: Screening Not Indicated      Lung Cancer Screening:     General: Screening Not Indicated      Hepatitis C Screening:    General: Screening Not Indicated    Screening, Brief Intervention, and Referral to Treatment (SBIRT)    Screening  Typical number of drinks in a day: 0  Typical number of drinks in a week: 0  Interpretation: Low risk drinking behavior.    Single Item Drug Screening:  How often have you used an illegal drug (including marijuana) or a prescription medication for non-medical reasons in the past year? never    Single Item Drug Screen Score: 0  Interpretation: Negative screen for possible drug use disorder    Brief Intervention  Alcohol & drug use screenings were reviewed. No concerns regarding substance use disorder identified.     Annual Depression Screening  Time spent screening and  "evaluating the patient for depression during today's encounter was 5 minutes.    Social Determinants of Health     Financial Resource Strain: Low Risk  (12/28/2022)    Overall Financial Resource Strain (CARDIA)    • Difficulty of Paying Living Expenses: Not hard at all   Food Insecurity: No Food Insecurity (8/7/2024)    Hunger Vital Sign    • Worried About Running Out of Food in the Last Year: Never true    • Ran Out of Food in the Last Year: Never true   Transportation Needs: No Transportation Needs (8/7/2024)    PRAPARE - Transportation    • Lack of Transportation (Medical): No    • Lack of Transportation (Non-Medical): No   Housing Stability: Low Risk  (8/7/2024)    Housing Stability Vital Sign    • Unable to Pay for Housing in the Last Year: No    • Number of Times Moved in the Last Year: 1    • Homeless in the Last Year: No   Utilities: Not At Risk (8/7/2024)    [facility] Utilities    • Threatened with loss of utilities: No     No results found.    Objective     /84 (BP Location: Left arm, Patient Position: Sitting, Cuff Size: Standard)   Pulse 72   Temp (!) 46.8 °F (8.2 °C) (Temporal)   Ht 5' 5.25\" (1.657 m)   Wt 78.9 kg (174 lb)   SpO2 99%   BMI 28.73 kg/m²     Physical Exam  Vitals and nursing note reviewed.   Constitutional:       General: She is not in acute distress.     Appearance: Normal appearance.   HENT:      Head: Normocephalic and atraumatic.   Eyes:      Pupils: Pupils are equal, round, and reactive to light.   Cardiovascular:      Rate and Rhythm: Normal rate and regular rhythm.      Pulses: Normal pulses.      Heart sounds: Normal heart sounds.   Pulmonary:      Effort: Pulmonary effort is normal.      Breath sounds: Normal breath sounds.   Musculoskeletal:         General: Normal range of motion.      Cervical back: Normal range of motion.   Skin:     General: Skin is warm and dry.   Neurological:      General: No focal deficit present.      Mental Status: She is alert and oriented to " person, place, and time. Mental status is at baseline.   Psychiatric:         Mood and Affect: Mood normal.         Behavior: Behavior normal.         Thought Content: Thought content normal.         Judgment: Judgment normal.

## 2024-09-04 DIAGNOSIS — I10 BENIGN ESSENTIAL HYPERTENSION: ICD-10-CM

## 2024-09-04 DIAGNOSIS — M19.90 ARTHRITIS: ICD-10-CM

## 2024-09-04 RX ORDER — CELECOXIB 200 MG/1
200 CAPSULE ORAL DAILY
Qty: 90 CAPSULE | Refills: 1 | Status: SHIPPED | OUTPATIENT
Start: 2024-09-04

## 2024-09-04 RX ORDER — LISINOPRIL 10 MG/1
10 TABLET ORAL DAILY
Qty: 90 TABLET | Refills: 1 | Status: SHIPPED | OUTPATIENT
Start: 2024-09-04

## 2024-09-04 NOTE — TELEPHONE ENCOUNTER
Reason for call:   [x] Refill   [] Prior Auth  [] Other:     Office:   [x] PCP/Provider - Tung Jaramillo,   [] Specialty/Provider -     Medication:       celecoxib (CeleBREX) 200 mg capsule     lisinopril (ZESTRIL) 10 mg tablet     Pharmacy: [id number]  [place] - [id number]     Does the patient have enough for 3 days?   [x] Yes   [] No - Send as HP to POD

## 2024-09-06 PROBLEM — Z00.00 MEDICARE ANNUAL WELLNESS VISIT, SUBSEQUENT: Status: RESOLVED | Noted: 2024-08-07 | Resolved: 2024-09-06

## 2024-09-19 ENCOUNTER — OFFICE VISIT (OUTPATIENT)
Dept: OBGYN CLINIC | Facility: MEDICAL CENTER | Age: 84
End: 2024-09-19
Payer: MEDICARE

## 2024-09-19 VITALS
HEART RATE: 86 BPM | BODY MASS INDEX: 28.99 KG/M2 | HEIGHT: 65 IN | DIASTOLIC BLOOD PRESSURE: 84 MMHG | SYSTOLIC BLOOD PRESSURE: 138 MMHG | WEIGHT: 174 LBS

## 2024-09-19 DIAGNOSIS — M17.12 PRIMARY OSTEOARTHRITIS OF LEFT KNEE: ICD-10-CM

## 2024-09-19 DIAGNOSIS — M25.562 ACUTE PAIN OF LEFT KNEE: Primary | ICD-10-CM

## 2024-09-19 PROCEDURE — 99213 OFFICE O/P EST LOW 20 MIN: CPT | Performed by: EMERGENCY MEDICINE

## 2024-09-19 NOTE — PROGRESS NOTES
Assessment/Plan:    Diagnoses and all orders for this visit:    Acute pain of left knee  -     Cancel: Injection Procedure Prior Authorization; Future  -     Injection Procedure Prior Authorization; Future    Primary osteoarthritis of left knee  -     Cancel: Injection Procedure Prior Authorization; Future  -     Injection Procedure Prior Authorization; Future    LEFT Knee  VISCOSUPPLEMENTATION:  Patient has failed conservative treatment including:  NSAIDs (Celebrex)  Tylenol   Home exercises   Weight loss  Assistive devices (no benefit from braces)    Patient has failed corticosteroid injection  Patient is symptomatic and pain interferes with ADLs/sleep  Pain score 5/10    Return for Visco Left knee.      Subjective:   Patient ID: Mary Mckenna is a 84 y.o. female.    Patient returns s/p CSI 7/25 with less than a week of benefit.  She continues with medial knee pain she is interested in viscosupplementation.  She is taking Celebrex    Initial note:  NP presents for Left knee pain after her knee gave out on her, but notes improved symptoms overall.    She has a little difficulty going up stairs, but is mostly concerned about her knee giving out on her again  Xrays Knee were obtained and reviewed today      Review of Systems    The following portions of the patient's chart were reviewed and updated as appropriate:   Allergy:    Allergies   Allergen Reactions    Cat Hair Extract     Molds & Smuts     Other     Pollen Extract        Medications:    Current Outpatient Medications:     atorvastatin (LIPITOR) 20 mg tablet, Take 2 tablets (40 mg total) by mouth daily, Disp: 180 tablet, Rfl: 1    Breo Ellipta 200-25 MCG/ACT inhaler, INHALE 1 PUFF BY MOUTH DAILY. RINSE MOUTH AFTER USE, Disp: 200 each, Rfl: 1    celecoxib (CeleBREX) 200 mg capsule, Take 1 capsule (200 mg total) by mouth daily, Disp: 90 capsule, Rfl: 1    cetirizine (ZyrTEC) 10 mg tablet, TK 1 T PO NIGHTLY, Disp: , Rfl: 3    Cholecalciferol 125 MCG (5000  UT) capsule, Take 2,000 Units by mouth daily  , Disp: , Rfl:     Dupixent 300 MG/2ML SOPN, every 14 (fourteen) days, Disp: , Rfl:     fluticasone (FLONASE) 50 mcg/act nasal spray, 2 sprays into each nostril daily Shake liquid and spray, Disp: 48 g, Rfl: 3    lisinopril (ZESTRIL) 10 mg tablet, Take 1 tablet (10 mg total) by mouth daily, Disp: 90 tablet, Rfl: 1    multivitamin (THERAGRAN) TABS, Take 1 tablet by mouth daily, Disp: , Rfl:     albuterol (PROVENTIL HFA,VENTOLIN HFA) 90 mcg/act inhaler, INHALE 2 PUFFS BY MOUTH EVERY 4 HOURS AS NEEDED FOR WHEEZING (Patient not taking: Reported on 12/22/2023), Disp: 25.5 g, Rfl: 1    ALPRAZolam (XANAX) 0.25 mg tablet, Take 1 tablet by mouth 2 (two) times a day as needed (Patient not taking: Reported on 6/21/2021), Disp: , Rfl:     aspirin (ECOTRIN LOW STRENGTH) 81 mg EC tablet, Take 1 tablet by mouth daily (Patient not taking: Reported on 12/22/2023), Disp: , Rfl:     budesonide (PULMICORT) 0.5 mg/2 mL nebulizer solution, EMPTY ONE RESPULE INTO YOUR SINUS RINSE BOTTLE AND LAVAGE NOSTRILS BID (Patient not taking: No sig reported), Disp: , Rfl: 3    denosumab (PROLIA) 60 mg/mL, Inject 1 mL (60 mg total) under the skin once for 1 dose, Disp: 1 mL, Rfl: 0    fluticasone-vilanterol 200-25 mcg/actuation inhaler, Inhale 1 puff daily Rinse mouth after use., Disp: 60 blister, Rfl: 5    Glucosamine-Chondroitin 500-400 MG CAPS, Take 1 tablet by mouth daily (Patient not taking: Reported on 12/22/2023), Disp: , Rfl:     ipratropium (ATROVENT) 0.02 % nebulizer solution, Inhale 1 each 4 (four) times a day as needed   (Patient not taking: Reported on 12/22/2023), Disp: , Rfl:     ondansetron (ZOFRAN) 4 mg tablet, Take 1 tablet (4 mg total) by mouth every 8 (eight) hours as needed for nausea or vomiting (Patient not taking: Reported on 6/23/2020), Disp: 20 tablet, Rfl: 0    predniSONE 10 mg tablet, Six p.o. for 1 day then 5 p.o. for 1 day then 4 p.o. for 1 day then 3 po for 1 day and 2 p.o.  "for 1 day then 1 p.o. (Patient not taking: Reported on 6/28/2023), Disp: 21 tablet, Rfl: 0    Current Facility-Administered Medications:     ropivacaine (NAROPIN) injection 4 mL, 4 mL, Intra-articular, Titrated, , 4 mL at 07/25/24 1525    Patient Active Problem List   Diagnosis    Abnormality of esophagus    Malignant neoplasm of ovary (HCC)    Anxiety    Asthma    Benign essential hypertension    Chronic sinusitis    Coronary artery disease involving native coronary artery of native heart without angina pectoris    Environmental and seasonal allergies    Gastroesophageal reflux disease with esophagitis    Ground glass opacity present on imaging of lung    Hyperlipidemia    Hyperthyroidism    Lung nodule    Mild persistent asthma without complication    Multinodular non-toxic goiter    NSTEMI (non-ST elevated myocardial infarction) ([facility])    Osteoarthritis    Osteopenia    Pancreatic cyst    Pulmonary nodule, right    Vitamin D deficiency    Localized osteoporosis without current pathological fracture       Objective:  /84   Pulse 86   Ht 5' 5.25\" (1.657 m)   Wt 78.9 kg (174 lb)   BMI 28.73 kg/m²     Left Knee Exam     Tenderness   The patient is experiencing tenderness in the medial joint line.    Other   Erythema: absent            Physical Exam      Neurologic Exam    Procedures    I have personally reviewed the written report of the pertinent studies.             Past Medical History:   Diagnosis Date    Acute systolic CHF (congestive heart failure) ([facility]) 12/20/2017    Last Assessment & Plan:  Euvolemic. NYHA class II. Change lopressor to toprol. On ACEI. Echo 40 days post ordered - she will be in [place] by then. We discussed if LVEF remains reduced (echo originally questioned stress induced CM) that goal  is to optimize medical therapy with ACEI and BB. If after three months of medical therapy, he LVEF remains reduced, AICD may be recommended. I stated it is concer    Allergic     Allergic rhinitis     " Last Assessed:5/21/2015    Benign essential hypertension     Last Assessed:4/24/2014    Hypertension     Personal history of medical treatment 06/23/2020    Tachycardia     Last Assessed:12/22/2012       Past Surgical History:   Procedure Laterality Date    CARDIAC SURGERY  2017    HYSTERECTOMY      OOPHORECTOMY Bilateral     age 49    THYROIDECTOMY, PARTIAL  2016    US GUIDED THYROID BIOPSY  10/26/2015       Social History     Socioeconomic History    Marital status: /Civil Union     Spouse name: Not on file    Number of children: Not on file    Years of education: Not on file    Highest education level: Not on file   Occupational History    Not on file   Tobacco Use    Smoking status: Never    Smokeless tobacco: Never   Vaping Use    Vaping status: Never Used   Substance and Sexual Activity    Alcohol use: Not Currently     Comment: occasional-rare    Drug use: No    Sexual activity: Not Currently     Partners: Male   Other Topics Concern    Not on file   Social History Narrative    Not on file     Social Determinants of Health     Financial Resource Strain: Low Risk  (12/28/2022)    Overall Financial Resource Strain (CARDIA)     Difficulty of Paying Living Expenses: Not hard at all   Food Insecurity: No Food Insecurity (8/7/2024)    Hunger Vital Sign     Worried About Running Out of Food in the Last Year: Never true     Ran Out of Food in the Last Year: Never true   Transportation Needs: No Transportation Needs (8/7/2024)    PRAPARE - Transportation     Lack of Transportation (Medical): No     Lack of Transportation (Non-Medical): No   Physical Activity: Not on file   Stress: Not on file   Social Connections: Not on file   Intimate Partner Violence: Not on file   Housing Stability: Low Risk  (8/7/2024)    Housing Stability Vital Sign     Unable to Pay for Housing in the Last Year: No     Number of Times Moved in the Last Year: 1     Homeless in the Last Year: No       Family History   Problem Relation Age  of Onset    Breast cancer Mother 52    Heart attack Father     Melanoma Daughter 18    No Known Problems Sister     No Known Problems Sister     No Known Problems Maternal Aunt     No Known Problems Maternal Grandmother     No Known Problems Maternal Grandfather     No Known Problems Paternal Grandmother     No Known Problems Paternal Grandfather     BRCA1 Negative Sister     Breast cancer Maternal Aunt         in 60's    No Known Problems Paternal Aunt

## 2024-09-26 ENCOUNTER — PROCEDURE VISIT (OUTPATIENT)
Dept: OBGYN CLINIC | Facility: MEDICAL CENTER | Age: 84
End: 2024-09-26
Payer: MEDICARE

## 2024-09-26 VITALS
WEIGHT: 174 LBS | HEART RATE: 74 BPM | SYSTOLIC BLOOD PRESSURE: 144 MMHG | BODY MASS INDEX: 28.99 KG/M2 | HEIGHT: 65 IN | DIASTOLIC BLOOD PRESSURE: 92 MMHG

## 2024-09-26 DIAGNOSIS — M25.562 ACUTE PAIN OF LEFT KNEE: Primary | ICD-10-CM

## 2024-09-26 DIAGNOSIS — M17.12 PRIMARY OSTEOARTHRITIS OF LEFT KNEE: ICD-10-CM

## 2024-09-26 PROCEDURE — 20610 DRAIN/INJ JOINT/BURSA W/O US: CPT | Performed by: EMERGENCY MEDICINE

## 2024-09-26 NOTE — PROGRESS NOTES
Assessment/Plan:    Diagnoses and all orders for this visit:    Acute pain of left knee  -     Large joint arthrocentesis: L knee    Primary osteoarthritis of left knee  -     Large joint arthrocentesis: L knee    Left knee Durolane provided today  s/p CSI 7/25 with less than a week of benefit.     Return if symptoms worsen or fail to improve.      Subjective:   Patient ID: Mary Mckenna is a 84 y.o. female.    HPI    Review of Systems    The following portions of the patient's chart were reviewed and updated as appropriate:   Allergy:    Allergies   Allergen Reactions   • Cat Hair Extract    • Molds & Smuts    • Other    • Pollen Extract        Medications:    Current Outpatient Medications:   •  atorvastatin (LIPITOR) 20 mg tablet, Take 2 tablets (40 mg total) by mouth daily, Disp: 180 tablet, Rfl: 1  •  Breo Ellipta 200-25 MCG/ACT inhaler, INHALE 1 PUFF BY MOUTH DAILY. RINSE MOUTH AFTER USE, Disp: 200 each, Rfl: 1  •  celecoxib (CeleBREX) 200 mg capsule, Take 1 capsule (200 mg total) by mouth daily, Disp: 90 capsule, Rfl: 1  •  cetirizine (ZyrTEC) 10 mg tablet, TK 1 T PO NIGHTLY, Disp: , Rfl: 3  •  Cholecalciferol 125 MCG (5000 UT) capsule, Take 2,000 Units by mouth daily  , Disp: , Rfl:   •  Dupixent 300 MG/2ML SOPN, every 14 (fourteen) days, Disp: , Rfl:   •  fluticasone (FLONASE) 50 mcg/act nasal spray, 2 sprays into each nostril daily Shake liquid and spray, Disp: 48 g, Rfl: 3  •  lisinopril (ZESTRIL) 10 mg tablet, Take 1 tablet (10 mg total) by mouth daily, Disp: 90 tablet, Rfl: 1  •  multivitamin (THERAGRAN) TABS, Take 1 tablet by mouth daily, Disp: , Rfl:   •  albuterol (PROVENTIL HFA,VENTOLIN HFA) 90 mcg/act inhaler, INHALE 2 PUFFS BY MOUTH EVERY 4 HOURS AS NEEDED FOR WHEEZING (Patient not taking: Reported on 12/22/2023), Disp: 25.5 g, Rfl: 1  •  ALPRAZolam (XANAX) 0.25 mg tablet, Take 1 tablet by mouth 2 (two) times a day as needed (Patient not taking: Reported on 6/21/2021), Disp: , Rfl:   •   aspirin (ECOTRIN LOW STRENGTH) 81 mg EC tablet, Take 1 tablet by mouth daily (Patient not taking: Reported on 12/22/2023), Disp: , Rfl:   •  budesonide (PULMICORT) 0.5 mg/2 mL nebulizer solution, EMPTY ONE RESPULE INTO YOUR SINUS RINSE BOTTLE AND LAVAGE NOSTRILS BID (Patient not taking: No sig reported), Disp: , Rfl: 3  •  denosumab (PROLIA) 60 mg/mL, Inject 1 mL (60 mg total) under the skin once for 1 dose, Disp: 1 mL, Rfl: 0  •  fluticasone-vilanterol 200-25 mcg/actuation inhaler, Inhale 1 puff daily Rinse mouth after use., Disp: 60 blister, Rfl: 5  •  Glucosamine-Chondroitin 500-400 MG CAPS, Take 1 tablet by mouth daily (Patient not taking: Reported on 12/22/2023), Disp: , Rfl:   •  ipratropium (ATROVENT) 0.02 % nebulizer solution, Inhale 1 each 4 (four) times a day as needed   (Patient not taking: Reported on 12/22/2023), Disp: , Rfl:   •  ondansetron (ZOFRAN) 4 mg tablet, Take 1 tablet (4 mg total) by mouth every 8 (eight) hours as needed for nausea or vomiting (Patient not taking: Reported on 6/23/2020), Disp: 20 tablet, Rfl: 0  •  predniSONE 10 mg tablet, Six p.o. for 1 day then 5 p.o. for 1 day then 4 p.o. for 1 day then 3 po for 1 day and 2 p.o. for 1 day then 1 p.o. (Patient not taking: Reported on 6/28/2023), Disp: 21 tablet, Rfl: 0    Current Facility-Administered Medications:   •  ropivacaine (NAROPIN) injection 4 mL, 4 mL, Intra-articular, Titrated, , 4 mL at 07/25/24 1525    Patient Active Problem List   Diagnosis   • Abnormality of esophagus   • Malignant neoplasm of ovary (HCC)   • Anxiety   • Asthma   • Benign essential hypertension   • Chronic sinusitis   • Coronary artery disease involving native coronary artery of native heart without angina pectoris   • Environmental and seasonal allergies   • Gastroesophageal reflux disease with esophagitis   • Ground glass opacity present on imaging of lung   • Hyperlipidemia   • Hyperthyroidism   • Lung nodule   • Mild persistent asthma without complication  "  • Multinodular non-toxic goiter   • NSTEMI (non-ST elevated myocardial infarction) (HCC)   • Osteoarthritis   • Osteopenia   • Pancreatic cyst   • Pulmonary nodule, right   • Vitamin D deficiency   • Localized osteoporosis without current pathological fracture       Objective:  /92   Pulse 74   Ht 5' 5.25\" (1.657 m)   Wt 78.9 kg (174 lb)   BMI 28.73 kg/m²     Ortho Exam    Physical Exam      Neurologic Exam    Large joint arthrocentesis: L knee  Universal Protocol:  Consent: Verbal consent obtained.  Risks and benefits: risks, benefits and alternatives were discussed  Consent given by: patient  Time out: Immediately prior to procedure a \"time out\" was called to verify the correct patient, procedure, equipment, support staff and site/side marked as required.  Timeout called at: 9/26/2024 4:17 PM.  Patient understanding: patient states understanding of the procedure being performed  Test results: test results available and properly labeled  Site marked: the operative site was marked  Patient identity confirmed: verbally with patient  Supporting Documentation  Indications: pain   Procedure Details  Location: knee - L knee  Preparation: Patient was prepped and draped in the usual sterile fashion  Needle size: 20 G  Ultrasound guidance: no  Approach: anterolateral  Medications administered: 3 mL sodium hyaluronate 60 MG/3ML    Patient tolerance: patient tolerated the procedure well with no immediate complications  Dressing:  Sterile dressing applied    No erythema of knee(s)      I have personally reviewed the written report of the pertinent studies.             Past Medical History:   Diagnosis Date   • Acute systolic CHF (congestive heart failure) ([facility]) 12/20/2017    Last Assessment & Plan:  Euvolemic. NYHA class II. Change lopressor to toprol. On ACEI. Echo 40 days post ordered - she will be in [place] by then. We discussed if LVEF remains reduced (echo originally questioned stress induced CM) that goal  is to " optimize medical therapy with ACEI and BB. If after three months of medical therapy, he LVEF remains reduced, AICD may be recommended. I stated it is concer   • Allergic    • Allergic rhinitis     Last Assessed:5/21/2015   • Benign essential hypertension     Last Assessed:4/24/2014   • Hypertension    • Personal history of medical treatment 06/23/2020   • Tachycardia     Last Assessed:12/22/2012       Past Surgical History:   Procedure Laterality Date   • CARDIAC SURGERY  2017   • HYSTERECTOMY     • OOPHORECTOMY Bilateral     age 49   • THYROIDECTOMY, PARTIAL  2016   • US GUIDED THYROID BIOPSY  10/26/2015       Social History     Socioeconomic History   • Marital status: /Civil Union     Spouse name: Not on file   • Number of children: Not on file   • Years of education: Not on file   • Highest education level: Not on file   Occupational History   • Not on file   Tobacco Use   • Smoking status: Never   • Smokeless tobacco: Never   Vaping Use   • Vaping status: Never Used   Substance and Sexual Activity   • Alcohol use: Not Currently     Comment: occasional-rare   • Drug use: No   • Sexual activity: Not Currently     Partners: Male   Other Topics Concern   • Not on file   Social History Narrative   • Not on file     Social Determinants of Health     Financial Resource Strain: Low Risk  (12/28/2022)    Overall Financial Resource Strain (CARDIA)    • Difficulty of Paying Living Expenses: Not hard at all   Food Insecurity: No Food Insecurity (8/7/2024)    Hunger Vital Sign    • Worried About Running Out of Food in the Last Year: Never true    • Ran Out of Food in the Last Year: Never true   Transportation Needs: No Transportation Needs (8/7/2024)    PRAPARE - Transportation    • Lack of Transportation (Medical): No    • Lack of Transportation (Non-Medical): No   Physical Activity: Not on file   Stress: Not on file   Social Connections: Not on file   Intimate Partner Violence: Not on file   Housing Stability: Low  Risk  (8/7/2024)    Housing Stability Vital Sign    • Unable to Pay for Housing in the Last Year: No    • Number of Times Moved in the Last Year: 1    • Homeless in the Last Year: No       Family History   Problem Relation Age of Onset   • Breast cancer Mother 52   • Heart attack Father    • Melanoma Daughter 18   • No Known Problems Sister    • No Known Problems Sister    • No Known Problems Maternal Aunt    • No Known Problems Maternal Grandmother    • No Known Problems Maternal Grandfather    • No Known Problems Paternal Grandmother    • No Known Problems Paternal Grandfather    • BRCA1 Negative Sister    • Breast cancer Maternal Aunt         in 60's   • No Known Problems Paternal Aunt

## 2024-10-25 ENCOUNTER — OFFICE VISIT (OUTPATIENT)
Dept: OBGYN CLINIC | Facility: MEDICAL CENTER | Age: 84
End: 2024-10-25
Payer: MEDICARE

## 2024-10-25 VITALS
SYSTOLIC BLOOD PRESSURE: 144 MMHG | BODY MASS INDEX: 28.82 KG/M2 | HEART RATE: 96 BPM | DIASTOLIC BLOOD PRESSURE: 82 MMHG | HEIGHT: 65 IN | WEIGHT: 173 LBS

## 2024-10-25 DIAGNOSIS — M17.12 PRIMARY OSTEOARTHRITIS OF LEFT KNEE: ICD-10-CM

## 2024-10-25 DIAGNOSIS — M25.562 CHRONIC PAIN OF LEFT KNEE: Primary | ICD-10-CM

## 2024-10-25 DIAGNOSIS — G89.29 CHRONIC PAIN OF LEFT KNEE: Primary | ICD-10-CM

## 2024-10-25 PROCEDURE — 99213 OFFICE O/P EST LOW 20 MIN: CPT | Performed by: EMERGENCY MEDICINE

## 2024-10-25 PROCEDURE — 20610 DRAIN/INJ JOINT/BURSA W/O US: CPT | Performed by: EMERGENCY MEDICINE

## 2024-10-25 RX ORDER — ROPIVACAINE HYDROCHLORIDE 2 MG/ML
4 INJECTION, SOLUTION EPIDURAL; INFILTRATION; PERINEURAL
Status: COMPLETED | OUTPATIENT
Start: 2024-10-25 | End: 2024-10-25

## 2024-10-25 RX ORDER — TRIAMCINOLONE ACETONIDE 40 MG/ML
40 INJECTION, SUSPENSION INTRA-ARTICULAR; INTRAMUSCULAR
Status: COMPLETED | OUTPATIENT
Start: 2024-10-25 | End: 2024-10-25

## 2024-10-25 RX ADMIN — TRIAMCINOLONE ACETONIDE 40 MG: 40 INJECTION, SUSPENSION INTRA-ARTICULAR; INTRAMUSCULAR at 12:15

## 2024-10-25 RX ADMIN — ROPIVACAINE HYDROCHLORIDE 4 ML: 2 INJECTION, SOLUTION EPIDURAL; INFILTRATION; PERINEURAL at 12:15

## 2024-10-25 NOTE — PROGRESS NOTES
Assessment/Plan:    Diagnoses and all orders for this visit:    Chronic pain of left knee  -     Large joint arthrocentesis: L knee    Primary osteoarthritis of left knee  -     Large joint arthrocentesis: L knee    Repeat CSI provided today left knee  Benefit from [facility]  Continue outside PT    Return if symptoms worsen or fail to improve.      Subjective:   Patient ID: Mary Mckenna is a 84 y.o. female.    Mary returns for posterior knee pains.  She had improvement from Visco 9/26.    She is participating in outside PT for b/l knees      Review of Systems    The following portions of the patient's chart were reviewed and updated as appropriate:   Allergy:    Allergies   Allergen Reactions    Cat Hair Extract     Molds & Smuts     Other     Pollen Extract        Medications:    Current Outpatient Medications:     atorvastatin (LIPITOR) 20 mg tablet, Take 2 tablets (40 mg total) by mouth daily, Disp: 180 tablet, Rfl: 1    Breo Ellipta 200-25 MCG/ACT inhaler, INHALE 1 PUFF BY MOUTH DAILY. RINSE MOUTH AFTER USE, Disp: 200 each, Rfl: 1    celecoxib (CeleBREX) 200 mg capsule, Take 1 capsule (200 mg total) by mouth daily, Disp: 90 capsule, Rfl: 1    cetirizine (ZyrTEC) 10 mg tablet, TK 1 T PO NIGHTLY, Disp: , Rfl: 3    Cholecalciferol 125 MCG (5000 UT) capsule, Take 2,000 Units by mouth daily  , Disp: , Rfl:     Dupixent 300 MG/2ML SOPN, every 14 (fourteen) days, Disp: , Rfl:     fluticasone (FLONASE) 50 mcg/act nasal spray, 2 sprays into each nostril daily Shake liquid and spray, Disp: 48 g, Rfl: 3    lisinopril (ZESTRIL) 10 mg tablet, Take 1 tablet (10 mg total) by mouth daily, Disp: 90 tablet, Rfl: 1    multivitamin (THERAGRAN) TABS, Take 1 tablet by mouth daily, Disp: , Rfl:     albuterol (PROVENTIL HFA,VENTOLIN HFA) 90 mcg/act inhaler, INHALE 2 PUFFS BY MOUTH EVERY 4 HOURS AS NEEDED FOR WHEEZING (Patient not taking: Reported on 12/22/2023), Disp: 25.5 g, Rfl: 1    ALPRAZolam (XANAX) 0.25 mg tablet, Take 1  tablet by mouth 2 (two) times a day as needed (Patient not taking: Reported on 6/21/2021), Disp: , Rfl:     aspirin (ECOTRIN LOW STRENGTH) 81 mg EC tablet, Take 1 tablet by mouth daily (Patient not taking: Reported on 12/22/2023), Disp: , Rfl:     budesonide (PULMICORT) 0.5 mg/2 mL nebulizer solution, EMPTY ONE RESPULE INTO YOUR SINUS RINSE BOTTLE AND LAVAGE NOSTRILS BID (Patient not taking: No sig reported), Disp: , Rfl: 3    denosumab (PROLIA) 60 mg/mL, Inject 1 mL (60 mg total) under the skin once for 1 dose, Disp: 1 mL, Rfl: 0    fluticasone-vilanterol 200-25 mcg/actuation inhaler, Inhale 1 puff daily Rinse mouth after use., Disp: 60 blister, Rfl: 5    Glucosamine-Chondroitin 500-400 MG CAPS, Take 1 tablet by mouth daily (Patient not taking: Reported on 12/22/2023), Disp: , Rfl:     ipratropium (ATROVENT) 0.02 % nebulizer solution, Inhale 1 each 4 (four) times a day as needed   (Patient not taking: Reported on 12/22/2023), Disp: , Rfl:     ondansetron (ZOFRAN) 4 mg tablet, Take 1 tablet (4 mg total) by mouth every 8 (eight) hours as needed for nausea or vomiting (Patient not taking: Reported on 6/23/2020), Disp: 20 tablet, Rfl: 0    predniSONE 10 mg tablet, Six p.o. for 1 day then 5 p.o. for 1 day then 4 p.o. for 1 day then 3 po for 1 day and 2 p.o. for 1 day then 1 p.o. (Patient not taking: Reported on 6/28/2023), Disp: 21 tablet, Rfl: 0    Current Facility-Administered Medications:     ropivacaine (NAROPIN) injection 4 mL, 4 mL, Intra-articular, Titrated, , 4 mL at 07/25/24 1525    Patient Active Problem List   Diagnosis    Abnormality of esophagus    Malignant neoplasm of ovary (HCC)    Anxiety    Asthma    Benign essential hypertension    Chronic sinusitis    Coronary artery disease involving native coronary artery of native heart without angina pectoris    Environmental and seasonal allergies    Gastroesophageal reflux disease with esophagitis    Ground glass opacity present on imaging of lung     "Hyperlipidemia    Hyperthyroidism    Lung nodule    Mild persistent asthma without complication    Multinodular non-toxic goiter    NSTEMI (non-ST elevated myocardial infarction) (HCC)    Osteoarthritis    Osteopenia    Pancreatic cyst    Pulmonary nodule, right    Vitamin D deficiency    Localized osteoporosis without current pathological fracture       Objective:  /82   Pulse 96   Ht 5' 5.25\" (1.657 m)   Wt 78.5 kg (173 lb)   BMI 28.57 kg/m²     Right Knee Exam     Range of Motion   The patient has normal right knee ROM.      Left Knee Exam     Tenderness   The patient is experiencing no tenderness.     Range of Motion   The patient has normal left knee ROM.    Other   Erythema: absent  Sensation: normal  Swelling: mild    Comments:  No obvious large palpable bakers cyst            Physical Exam      Neurologic Exam    Large joint arthrocentesis: L knee  Universal Protocol:  Consent: Verbal consent obtained.  Risks and benefits: risks, benefits and alternatives were discussed  Consent given by: patient  Time out: Immediately prior to procedure a \"time out\" was called to verify the correct patient, procedure, equipment, support staff and site/side marked as required.  Timeout called at: 10/25/2024 12:36 PM.  Patient understanding: patient states understanding of the procedure being performed  Test results: test results available and properly labeled  Site marked: the operative site was marked  Patient identity confirmed: verbally with patient  Supporting Documentation  Indications: pain   Procedure Details  Location: knee - L knee  Preparation: Patient was prepped and draped in the usual sterile fashion  Needle size: 22 G  Ultrasound guidance: no  Approach: anterolateral  Medications administered: 40 mg triamcinolone acetonide 40 mg/mL; 4 mL ropivacaine 0.2 %    Patient tolerance: patient tolerated the procedure well with no immediate complications  Dressing:  Sterile dressing applied    No erythema of " knee(s)        I have personally reviewed the written report of the pertinent studies. Xrays Left knee            Past Medical History:   Diagnosis Date    Acute systolic CHF (congestive heart failure) (HCC) 12/20/2017    Last Assessment & Plan:  Euvolemic. NYHA class II. Change lopressor to toprol. On ACEI. Echo 40 days post ordered - [place] will be in [place] by then. We discussed if LVEF remains reduced (echo originally questioned stress induced CM) that goal  is to optimize medical therapy with ACEI and BB. If after three months of medical therapy, he LVEF remains reduced, AICD may be recommended. I stated it is concer    Allergic     Allergic rhinitis     Last Assessed:5/21/2015    Benign essential hypertension     Last Assessed:4/24/2014    Hypertension     Personal history of medical treatment 06/23/2020    Tachycardia     Last Assessed:12/22/2012       Past Surgical History:   Procedure Laterality Date    CARDIAC SURGERY  2017    HYSTERECTOMY      OOPHORECTOMY Bilateral     age 49    THYROIDECTOMY, PARTIAL  2016    US GUIDED THYROID BIOPSY  10/26/2015       Social History     Socioeconomic History    Marital status: /Civil Union     Spouse name: Not on file    Number of children: Not on file    Years of education: Not on file    Highest education level: Not on file   Occupational History    Not on file   Tobacco Use    Smoking status: Never    Smokeless tobacco: Never   Vaping Use    Vaping status: Never Used   Substance and Sexual Activity    Alcohol use: Not Currently     Comment: occasional-rare    Drug use: No    Sexual activity: Not Currently     Partners: Male   Other Topics Concern    Not on file   Social History Narrative    Not on file     Social Determinants of Health     Financial Resource Strain: Low Risk  (12/28/2022)    Overall Financial Resource Strain (CARDIA)     Difficulty of Paying Living Expenses: Not hard at all   Food Insecurity: No Food Insecurity (8/7/2024)    Nursing - Inadequate Food  Risk Classification     Worried About Running Out of Food in the Last Year: Never true     Ran Out of Food in the Last Year: Never true     Ran Out of Food in the Last Year: Not on file   Transportation Needs: No Transportation Needs (8/7/2024)    PRAPARE - Transportation     Lack of Transportation (Medical): No     Lack of Transportation (Non-Medical): No   Physical Activity: Not on file   Stress: Not on file   Social Connections: Not on file   Intimate Partner Violence: Not on file   Housing Stability: Low Risk  (8/7/2024)    Housing Stability Vital Sign     Unable to Pay for Housing in the Last Year: No     Number of Times Moved in the Last Year: 1     Homeless in the Last Year: No       Family History   Problem Relation Age of Onset    Breast cancer Mother 52    Heart attack Father     Melanoma Daughter 18    No Known Problems Sister     No Known Problems Sister     No Known Problems Maternal Aunt     No Known Problems Maternal Grandmother     No Known Problems Maternal Grandfather     No Known Problems Paternal Grandmother     No Known Problems Paternal Grandfather     BRCA1 Negative Sister     Breast cancer Maternal Aunt         in 60's    No Known Problems Paternal Aunt

## 2024-12-07 ENCOUNTER — OFFICE VISIT (OUTPATIENT)
Dept: OBGYN CLINIC | Facility: MEDICAL CENTER | Age: 84
End: 2024-12-07
Payer: MEDICARE

## 2024-12-07 VITALS
DIASTOLIC BLOOD PRESSURE: 85 MMHG | WEIGHT: 176 LBS | HEIGHT: 65 IN | HEART RATE: 68 BPM | SYSTOLIC BLOOD PRESSURE: 176 MMHG | BODY MASS INDEX: 29.32 KG/M2

## 2024-12-07 DIAGNOSIS — G89.29 CHRONIC PAIN OF LEFT KNEE: Primary | ICD-10-CM

## 2024-12-07 DIAGNOSIS — M25.562 CHRONIC PAIN OF LEFT KNEE: Primary | ICD-10-CM

## 2024-12-07 DIAGNOSIS — M17.12 PRIMARY OSTEOARTHRITIS OF LEFT KNEE: ICD-10-CM

## 2024-12-07 PROCEDURE — 20610 DRAIN/INJ JOINT/BURSA W/O US: CPT | Performed by: EMERGENCY MEDICINE

## 2024-12-07 PROCEDURE — 99213 OFFICE O/P EST LOW 20 MIN: CPT | Performed by: EMERGENCY MEDICINE

## 2024-12-07 RX ADMIN — TRIAMCINOLONE ACETONIDE 40 MG: 40 INJECTION, SUSPENSION INTRA-ARTICULAR; INTRAMUSCULAR at 11:15

## 2024-12-07 RX ADMIN — ROPIVACAINE HYDROCHLORIDE 4 ML: 2 INJECTION, SOLUTION EPIDURAL; INFILTRATION; PERINEURAL at 11:15

## 2024-12-07 NOTE — PROGRESS NOTES
Assessment/Plan:    Diagnoses and all orders for this visit:    Chronic pain of left knee  -     Large joint arthrocentesis: L knee    Primary osteoarthritis of left knee  -     Large joint arthrocentesis: L knee    We have provided a repeat CSI left knee per patient request as she will be flying down to [place].  We did discuss that a repeat CSI performed today is earlier than what is recommended and it is likely that the benefit will be similar to previous.  We also discussed repeating the viscosupplementation injection in 3 months.  We also discussed PRP information was provided and could provide PRP 6 or more weeks from today's visit    Return if symptoms worsen or fail to improve.      Subjective:   Patient ID: Mary Mckenna is a 84 y.o. female.    Mary returns s/p CSI left knee 10/25/24 with benefit for a short time, she's been experiencing sudden pain when walking feeling like her something is moving out of place this lasting usually several steps then resolving.  She is participating in formal PT, she will wear knee brace.    She did have benefit from prior visco 9/26/24        Review of Systems    The following portions of the patient's chart were reviewed and updated as appropriate:   Allergy:    Allergies   Allergen Reactions    Cat Hair Extract     Molds & Smuts     Other     Pollen Extract        Medications:    Current Outpatient Medications:     atorvastatin (LIPITOR) 20 mg tablet, Take 2 tablets (40 mg total) by mouth daily, Disp: 180 tablet, Rfl: 1    Breo Ellipta 200-25 MCG/ACT inhaler, INHALE 1 PUFF BY MOUTH DAILY. RINSE MOUTH AFTER USE, Disp: 200 each, Rfl: 1    celecoxib (CeleBREX) 200 mg capsule, Take 1 capsule (200 mg total) by mouth daily, Disp: 90 capsule, Rfl: 1    cetirizine (ZyrTEC) 10 mg tablet, TK 1 T PO NIGHTLY, Disp: , Rfl: 3    Cholecalciferol 125 MCG (5000 UT) capsule, Take 2,000 Units by mouth daily  , Disp: , Rfl:     Dupixent 300 MG/2ML SOPN, every 14 (fourteen) days, Disp:  , Rfl:     fluticasone (FLONASE) 50 mcg/act nasal spray, 2 sprays into each nostril daily Shake liquid and spray, Disp: 48 g, Rfl: 3    lisinopril (ZESTRIL) 10 mg tablet, Take 1 tablet (10 mg total) by mouth daily, Disp: 90 tablet, Rfl: 1    multivitamin (THERAGRAN) TABS, Take 1 tablet by mouth daily, Disp: , Rfl:     albuterol (PROVENTIL HFA,VENTOLIN HFA) 90 mcg/act inhaler, INHALE 2 PUFFS BY MOUTH EVERY 4 HOURS AS NEEDED FOR WHEEZING (Patient not taking: Reported on 12/7/2024), Disp: 25.5 g, Rfl: 1    ALPRAZolam (XANAX) 0.25 mg tablet, Take 1 tablet by mouth 2 (two) times a day as needed (Patient not taking: Reported on 6/21/2021), Disp: , Rfl:     aspirin (ECOTRIN LOW STRENGTH) 81 mg EC tablet, Take 1 tablet by mouth daily (Patient not taking: Reported on 12/22/2023), Disp: , Rfl:     budesonide (PULMICORT) 0.5 mg/2 mL nebulizer solution, EMPTY ONE RESPULE INTO YOUR SINUS RINSE BOTTLE AND LAVAGE NOSTRILS BID (Patient not taking: No sig reported), Disp: , Rfl: 3    denosumab (PROLIA) 60 mg/mL, Inject 1 mL (60 mg total) under the skin once for 1 dose, Disp: 1 mL, Rfl: 0    fluticasone-vilanterol 200-25 mcg/actuation inhaler, Inhale 1 puff daily Rinse mouth after use., Disp: 60 blister, Rfl: 5    Glucosamine-Chondroitin 500-400 MG CAPS, Take 1 tablet by mouth daily (Patient not taking: Reported on 12/22/2023), Disp: , Rfl:     ipratropium (ATROVENT) 0.02 % nebulizer solution, Inhale 1 each 4 (four) times a day as needed   (Patient not taking: Reported on 12/22/2023), Disp: , Rfl:     ondansetron (ZOFRAN) 4 mg tablet, Take 1 tablet (4 mg total) by mouth every 8 (eight) hours as needed for nausea or vomiting (Patient not taking: Reported on 6/23/2020), Disp: 20 tablet, Rfl: 0    predniSONE 10 mg tablet, Six p.o. for 1 day then 5 p.o. for 1 day then 4 p.o. for 1 day then 3 po for 1 day and 2 p.o. for 1 day then 1 p.o. (Patient not taking: Reported on 12/7/2024), Disp: 21 tablet, Rfl: 0    Current Facility-Administered  "Medications:     ropivacaine (NAROPIN) injection 4 mL, 4 mL, Intra-articular, Titrated, , 4 mL at 07/25/24 1525    Patient Active Problem List   Diagnosis    Abnormality of esophagus    Malignant neoplasm of ovary (HCC)    Anxiety    Asthma    Benign essential hypertension    Chronic sinusitis    Coronary artery disease involving native coronary artery of native heart without angina pectoris    Environmental and seasonal allergies    Gastroesophageal reflux disease with esophagitis    Ground glass opacity present on imaging of lung    Hyperlipidemia    Hyperthyroidism    Lung nodule    Mild persistent asthma without complication    Multinodular non-toxic goiter    NSTEMI (non-ST elevated myocardial infarction) (HCC)    Osteoarthritis    Osteopenia    Pancreatic cyst    Pulmonary nodule, right    Vitamin D deficiency    Localized osteoporosis without current pathological fracture       Objective:  BP (!) 176/85   Pulse 68   Ht 5' 5.25\" (1.657 m)   Wt 79.8 kg (176 lb)   BMI 29.06 kg/m²     Left Knee Exam     Other   Erythema: absent          Physical Exam      Neurologic Exam    Large joint arthrocentesis: L knee  Universal Protocol:  Consent: Verbal consent obtained.  Risks and benefits: risks, benefits and alternatives were discussed  Consent given by: patient  Time out: Immediately prior to procedure a \"time out\" was called to verify the correct patient, procedure, equipment, support staff and site/side marked as required.  Timeout called at: 12/7/2024 11:47 AM.  Patient understanding: patient states understanding of the procedure being performed  Test results: test results available and properly labeled  Site marked: the operative site was marked  Patient identity confirmed: verbally with patient  Supporting Documentation  Indications: pain   Procedure Details  Location: knee - L knee  Preparation: Patient was prepped and draped in the usual sterile fashion  Needle size: 22 G  Ultrasound guidance: " no  Approach: anterolateral  Medications administered: 40 mg triamcinolone acetonide 40 mg/mL; 4 mL ropivacaine 0.2 %    Patient tolerance: patient tolerated the procedure well with no immediate complications  Dressing:  Sterile dressing applied    No erythema of knee(s)        I have personally reviewed the written report of the pertinent studies.             Past Medical History:   Diagnosis Date    Acute systolic CHF (congestive heart failure) ([facility]) 12/20/2017    Last Assessment & Plan:  Euvolemic. NYHA class II. Change lopressor to toprol. On ACEI. Echo 40 days post ordered - she will be in [place] by then. We discussed if LVEF remains reduced (echo originally questioned stress induced CM) that goal  is to optimize medical therapy with ACEI and BB. If after three months of medical therapy, he LVEF remains reduced, AICD may be recommended. I stated it is concer    Allergic     Allergic rhinitis     Last Assessed:5/21/2015    Benign essential hypertension     Last Assessed:4/24/2014    Hypertension     Personal history of medical treatment 06/23/2020    Tachycardia     Last Assessed:12/22/2012       Past Surgical History:   Procedure Laterality Date    CARDIAC SURGERY  2017    HYSTERECTOMY      OOPHORECTOMY Bilateral     age 49    THYROIDECTOMY, PARTIAL  2016    US GUIDED THYROID BIOPSY  10/26/2015       Social History     Socioeconomic History    Marital status: /Civil Union     Spouse name: Not on file    Number of children: Not on file    Years of education: Not on file    Highest education level: Not on file   Occupational History    Not on file   Tobacco Use    Smoking status: Never    Smokeless tobacco: Never   Vaping Use    Vaping status: Never Used   Substance and Sexual Activity    Alcohol use: Not Currently     Comment: occasional-rare    Drug use: No    Sexual activity: Not Currently     Partners: Male   Other Topics Concern    Not on file   Social History Narrative    Not on file     Social Drivers  of Health     Financial Resource Strain: Low Risk  (12/28/2022)    Overall Financial Resource Strain (CARDIA)     Difficulty of Paying Living Expenses: Not hard at all   Food Insecurity: No Food Insecurity (8/7/2024)    Nursing - Inadequate Food Risk Classification     Worried About Running Out of Food in the Last Year: Never true     Ran Out of Food in the Last Year: Never true     Ran Out of Food in the Last Year: Not on file   Transportation Needs: No Transportation Needs (8/7/2024)    PRAPARE - Transportation     Lack of Transportation (Medical): No     Lack of Transportation (Non-Medical): No   Physical Activity: Not on file   Stress: Not on file   Social Connections: Not on file   Intimate Partner Violence: Not on file   Housing Stability: Low Risk  (8/7/2024)    Housing Stability Vital Sign     Unable to Pay for Housing in the Last Year: No     Number of Times Moved in the Last Year: 1     Homeless in the Last Year: No       Family History   Problem Relation Age of Onset    Breast cancer Mother 52    Heart attack Father     Melanoma Daughter 18    No Known Problems Sister     No Known Problems Sister     No Known Problems Maternal Aunt     No Known Problems Maternal Grandmother     No Known Problems Maternal Grandfather     No Known Problems Paternal Grandmother     No Known Problems Paternal Grandfather     BRCA1 Negative Sister     Breast cancer Maternal Aunt         in 60's    No Known Problems Paternal Aunt

## 2024-12-09 RX ORDER — ROPIVACAINE HYDROCHLORIDE 2 MG/ML
4 INJECTION, SOLUTION EPIDURAL; INFILTRATION; PERINEURAL
Status: COMPLETED | OUTPATIENT
Start: 2024-12-07 | End: 2024-12-07

## 2024-12-09 RX ORDER — TRIAMCINOLONE ACETONIDE 40 MG/ML
40 INJECTION, SUSPENSION INTRA-ARTICULAR; INTRAMUSCULAR
Status: COMPLETED | OUTPATIENT
Start: 2024-12-07 | End: 2024-12-07

## 2024-12-12 ENCOUNTER — TELEPHONE (OUTPATIENT)
Dept: OBGYN CLINIC | Facility: MEDICAL CENTER | Age: 84
End: 2024-12-12

## 2024-12-13 ENCOUNTER — TELEPHONE (OUTPATIENT)
Dept: OBGYN CLINIC | Facility: MEDICAL CENTER | Age: 84
End: 2024-12-13

## 2024-12-13 NOTE — TELEPHONE ENCOUNTER
Caller: Patient    Doctor: Latosha    Reason for call: Returned call. Read message verbatim. The patient questioned if she could schedule an appt in April when she returns? Please return the call to schedule    Call back#: [phone]

## 2024-12-13 NOTE — TELEPHONE ENCOUNTER
LVM for pt to call back in regards to information of the PRP    Please relay the following:    She had a CSI on 12/7 With Dr. Reina- being that she had this, she would need to wait 6 weeks until she can have a PRP injection, which would take her to 1/18.

## 2024-12-13 NOTE — TELEPHONE ENCOUNTER
Spoke with pt and set up an apt with Dr. Humphrey to initiate care.    Dr. Reina, pt had a csi with you on 12/7- she states she has not gotten any relief from it- she would like to know if there is anti-inflammatory that you may be able to prescribe.

## 2024-12-16 NOTE — TELEPHONE ENCOUNTER
Called pt and LVM relaying provider message about medications and anti inflammatories. Call back number left in message.

## 2024-12-17 DIAGNOSIS — E78.2 MIXED HYPERLIPIDEMIA: ICD-10-CM

## 2024-12-17 NOTE — TELEPHONE ENCOUNTER
Reason for call:   [x] Refill   [] Prior Auth  [] Other:     Office:   [x] PCP/Provider - Dr Jaramillo  [] Specialty/Provider -     Medication: atorvastatin     Dose/Frequency: 20 mg take two daily     Quantity: 180    Pharmacy: [facility]    Does the patient have enough for 3 days?   [x] Yes   [] No - Send as HP to POD

## 2024-12-18 DIAGNOSIS — E78.2 MIXED HYPERLIPIDEMIA: ICD-10-CM

## 2024-12-18 RX ORDER — ATORVASTATIN CALCIUM 20 MG/1
40 TABLET, FILM COATED ORAL DAILY
Qty: 60 TABLET | Refills: 0 | Status: SHIPPED | OUTPATIENT
Start: 2024-12-18 | End: 2024-12-18

## 2024-12-18 RX ORDER — ATORVASTATIN CALCIUM 20 MG/1
40 TABLET, FILM COATED ORAL DAILY
Qty: 180 TABLET | Refills: 0 | Status: SHIPPED | OUTPATIENT
Start: 2024-12-18

## 2024-12-20 DIAGNOSIS — M81.0 OSTEOPOROSIS WITHOUT CURRENT PATHOLOGICAL FRACTURE, UNSPECIFIED OSTEOPOROSIS TYPE: ICD-10-CM

## 2024-12-20 NOTE — TELEPHONE ENCOUNTER
Reason for call:   [x] Refill   [] Prior Auth  [] Other:     Office:   [x] PCP/Provider - : Tung Jaramillo,    [] Specialty/Provider -     Medication: denosumab (PROLIA) 60 mg/mL     Dose/Frequency:  Inject 1 mL (60 mg total) under the skin once for 1 dose,     Quantity: 1 ml    Pharmacy: [id number] [address] [place] [address]     Does the patient have enough for 3 days?   [x] Yes   [] No - Send as HP to POD

## 2024-12-23 ENCOUNTER — TELEPHONE (OUTPATIENT)
Age: 84
End: 2024-12-23

## 2024-12-23 DIAGNOSIS — E78.2 MIXED HYPERLIPIDEMIA: Primary | ICD-10-CM

## 2024-12-26 ENCOUNTER — OFFICE VISIT (OUTPATIENT)
Dept: OBGYN CLINIC | Facility: MEDICAL CENTER | Age: 84
End: 2024-12-26
Payer: MEDICARE

## 2024-12-26 ENCOUNTER — TELEPHONE (OUTPATIENT)
Dept: FAMILY MEDICINE CLINIC | Facility: CLINIC | Age: 84
End: 2024-12-26

## 2024-12-26 VITALS — WEIGHT: 176 LBS | BODY MASS INDEX: 29.32 KG/M2 | HEIGHT: 65 IN

## 2024-12-26 DIAGNOSIS — M25.562 CHRONIC PAIN OF LEFT KNEE: Primary | ICD-10-CM

## 2024-12-26 DIAGNOSIS — M17.12 PRIMARY OSTEOARTHRITIS OF LEFT KNEE: ICD-10-CM

## 2024-12-26 DIAGNOSIS — G89.29 CHRONIC PAIN OF LEFT KNEE: Primary | ICD-10-CM

## 2024-12-26 PROCEDURE — 99213 OFFICE O/P EST LOW 20 MIN: CPT | Performed by: EMERGENCY MEDICINE

## 2024-12-26 RX ORDER — ACETAMINOPHEN AND CODEINE PHOSPHATE 300; 30 MG/1; MG/1
1 TABLET ORAL EVERY 8 HOURS PRN
Qty: 30 TABLET | Refills: 0 | Status: SHIPPED | OUTPATIENT
Start: 2024-12-26

## 2024-12-26 NOTE — PATIENT INSTRUCTIONS
For any further refills on your pain medicines he will need to speak with your primary care physician.    You may take Tylenol 500mg every 4-6 hours as needed OR max 1,000mg per dose up to 3 times per day for a total of 3,000mg per day      PRP (Platelet Rich Plasma)    What is PRP?  Platelet?rich plasma is a fraction of your blood which contains a higher concentration of platelets than whole blood. PRP therapy involves the injection of this platelet rich concentrate, rich in growth factors and nutrients, to enhance the natural healing response of an injured tissue.    What are the different orthopedic conditions for which PRP may be used?  PRP therapy may be used for conditions like osteoarthritis (degenerative joint disease), chronic tendinitis or chronic ligament and muscle injuries. Some examples include knee osteoarthritis, lateral (tennis elbow) or medial epicondylitis (golfer's elbow) of elbow, patellar or Achilles tendinitis, plantar fasciitis, chronic ligament sprains, etc.    Is PRP therapy right for you?  If you have tried conventional treatment methods (e.g., physical therapy, oral pain medications, bracing, etc.) and persist with chronic joint, muscle or tendon pain; then PRP therapy may be an option. Since every individual and condition is unique, it's best to be clinically evaluated by your physician to determine if PRP is a good option for you.    What are some contraindications for PRP therapy?  It is best to discuss your medical conditions and medications with your physician prior to receiving PRP therapy. Some medical conditions may not be appropriate for PRP therapy e.g., blood disorders, anemia or low platelet count, immunological problems, cancer, active or prolonged infections, pregnancy, chronic smoking.  Certain medications may lower the efficacy of PRP therapy. Examples include aspirin, NSAID's (Motrin, Advil, Aleve, Mobic/ ibuprofen, naproxen, diclofenac, meloxicam, etc.), blood thinners.      What is the scientific evidence for PRP therapy?  The scientific evidence for PRP is evolving. The position statement from [employer] Society for Sports Medicine, published in 2021 indicates that PRP is effective in reducing pain and improving function in patients with knee osteoarthritis, particularly in those with mild to moderate disease or younger individuals. Evidence for osteoarthritis of other joints is evolving. There is also some evidence for pain reduction in conditions such as lateral epicondylitis and other tendonitis. The evidence regarding PRP use for muscle or ligament injuries is limited.     What are the risks associated with PRP injection?  PRP injections are minimal risk since it is your own blood. Common risks include transient injection site increased pain, infection risk. You should discuss the potential risks for your PRP injection with your physician since it could vary based on your specific condition and injection site.      What can I do to optimize my PRP therapy?  Pre-procedure:   Discuss with your physician if you need to withhold any medications e.g., blood thinners, pain medications before the procedure.  Stay well hydrated on the day of procedure.  Inform physician of any recent medication change or infection.  If able to, perform some light exercise prior to the procedure. Some studies show improved platelet concentration following physical activity.  Post-procedure:  Avoid/ minimize taking NSAID's and blood thinners as per the direction of your treating physician.  Keep the injection area clean and dry and avoid submerging under water e.g., bathtub, swimming pool, ocean water for 4-5 days. May take a shower the next day.  You may experience some increased discomfort in the area where PRP was injected. This may last from a few days to 2-3 weeks. You can apply local ice for comfort and discuss medication options with your physician for any increased pain.  It is advisable  to have someone accompany you on the office visit as you may need help with driving following the injection.   Avoid strenuous exercise of the injected area until symptoms improve and following the guidance of your treating physician/ physical therapist.  Contact the treating physician immediately if there is significant increase in injection area pain associated with spreading redness, fever, or chills.     How is the procedure performed?  A blood sample is taken from the patient like having a lab test for blood. This sample is then placed in a centrifuge machine which separates the blood into different components. One component, platelet rich plasm (PRP) is carefully extracted from the centrifuged sample. This is then injected in the tissue/joint of concern. Sterile precautions are maintained throughout the procedure. Your physician may use an ultrasound machine to accurately deliver the injection into the tissue/joint of concern.     Do I need to do physical therapy after the PRP injection?  Several studies have demonstrated synergistic effect of physical therapy with PRP injection. Physical therapy after PRP is typically customized based on patient's individual scenario. Discuss with your physician about the need and duration of physical therapy following your PRP injection.    When can I expect improvement of symptoms, and will I need further PRP injections?  Symptom improvement can be variable among individuals. Commonly, most people will start noticing improvement in 6-8 weeks following the injection. If there is partial improvement of symptoms, you may opt for another PRP injection typically after 6-8 weeks.    Insurance coverage/Cost  Most traditional insurance companies currently do not cover the cost of PRP injection as it is not “FDA approved” and considered “FDA cleared”. Since PRP is derived from a patient's own blood, it is not considered a drug by FDA. “FDA clearance” means that a device has been  noted to be “substantially equivalent” to a currently marketed device. The cost of injection may vary depending on the type and number of PRP injections. Please confirm from your physician's office the exact cost and mode of payment for your PRP injection prior to scheduling the procedure.

## 2024-12-26 NOTE — TELEPHONE ENCOUNTER
Patient called the RX Refill Line. Message is being forwarded to the office.     Patient is asking about a message that was left on Dec 24,2024. She was unable to understand the message, but she assumes it was regarding lab work     Advised Dr Jaramillo sent a lab order to [facility]       Please contact patient at  [phone] if further information is required

## 2024-12-26 NOTE — PROGRESS NOTES
Assessment/Plan:    Diagnoses and all orders for this visit:    Chronic pain of left knee  -     acetaminophen-codeine (TYLENOL with CODEINE #3) 300-30 MG per tablet; Take 1 tablet by mouth every 8 (eight) hours as needed for moderate pain  -     MRI knee left wo contrast; Future    Primary osteoarthritis of left knee  -     acetaminophen-codeine (TYLENOL with CODEINE #3) 300-30 MG per tablet; Take 1 tablet by mouth every 8 (eight) hours as needed for moderate pain  -     MRI knee left wo contrast; Future    Patient requesting MRI of the left knee.  No benefit from 2 corticosteroid injections and visco.  She is interested in PRP and will most likely obtain this while down in [place].  I have provided her with information on PRP    Return if symptoms worsen or fail to improve.      Subjective:   Patient ID: Mary Mckenna is a 84 y.o. female.    Mary returns s/p repeat CSI 12/7 with no improvement.  She is hoping for repeat steroid injection.  She will be traveling down to [place] in January and is concerned about the drive.    Previous note:  Mary returns s/p CSI left knee 10/25/24 with benefit for a short time, she's been experiencing sudden pain when walking feeling like her something is moving out of place this lasting usually several steps then resolving.  She is participating in formal PT, she will wear knee brace.    She did have benefit from prior visco 9/26/24        Review of Systems    The following portions of the patient's chart were reviewed and updated as appropriate:   Allergy:    Allergies   Allergen Reactions    Cat Hair Extract     Molds & Smuts     Other     Pollen Extract        Medications:    Current Outpatient Medications:     acetaminophen-codeine (TYLENOL with CODEINE #3) 300-30 MG per tablet, Take 1 tablet by mouth every 8 (eight) hours as needed for moderate pain, Disp: 30 tablet, Rfl: 0    atorvastatin (LIPITOR) 20 mg tablet, TAKE 2 TABLETS(40 MG) BY MOUTH DAILY, Disp: 180 tablet,  Rfl: 0    Breo Ellipta 200-25 MCG/ACT inhaler, INHALE 1 PUFF BY MOUTH DAILY. RINSE MOUTH AFTER USE, Disp: 200 each, Rfl: 1    celecoxib (CeleBREX) 200 mg capsule, Take 1 capsule (200 mg total) by mouth daily, Disp: 90 capsule, Rfl: 1    cetirizine (ZyrTEC) 10 mg tablet, TK 1 T PO NIGHTLY, Disp: , Rfl: 3    Cholecalciferol 125 MCG (5000 UT) capsule, Take 2,000 Units by mouth daily  , Disp: , Rfl:     Dupixent 300 MG/2ML SOPN, every 14 (fourteen) days, Disp: , Rfl:     fluticasone (FLONASE) 50 mcg/act nasal spray, 2 sprays into each nostril daily Shake liquid and spray, Disp: 48 g, Rfl: 3    lisinopril (ZESTRIL) 10 mg tablet, Take 1 tablet (10 mg total) by mouth daily, Disp: 90 tablet, Rfl: 1    multivitamin (THERAGRAN) TABS, Take 1 tablet by mouth daily, Disp: , Rfl:     albuterol (PROVENTIL HFA,VENTOLIN HFA) 90 mcg/act inhaler, INHALE 2 PUFFS BY MOUTH EVERY 4 HOURS AS NEEDED FOR WHEEZING (Patient not taking: Reported on 12/26/2024), Disp: 25.5 g, Rfl: 1    ALPRAZolam (XANAX) 0.25 mg tablet, Take 1 tablet by mouth 2 (two) times a day as needed (Patient not taking: Reported on 6/21/2021), Disp: , Rfl:     aspirin (ECOTRIN LOW STRENGTH) 81 mg EC tablet, Take 1 tablet by mouth daily (Patient not taking: Reported on 12/22/2023), Disp: , Rfl:     budesonide (PULMICORT) 0.5 mg/2 mL nebulizer solution, EMPTY ONE RESPULE INTO YOUR SINUS RINSE BOTTLE AND LAVAGE NOSTRILS BID (Patient not taking: No sig reported), Disp: , Rfl: 3    denosumab (PROLIA) 60 mg/mL, Inject 1 mL (60 mg total) under the skin once for 1 dose, Disp: 1 mL, Rfl: 0    fluticasone-vilanterol 200-25 mcg/actuation inhaler, Inhale 1 puff daily Rinse mouth after use., Disp: 60 blister, Rfl: 5    Glucosamine-Chondroitin 500-400 MG CAPS, Take 1 tablet by mouth daily (Patient not taking: Reported on 12/22/2023), Disp: , Rfl:     ipratropium (ATROVENT) 0.02 % nebulizer solution, Inhale 1 each 4 (four) times a day as needed   (Patient not taking: Reported on  "12/22/2023), Disp: , Rfl:     ondansetron (ZOFRAN) 4 mg tablet, Take 1 tablet (4 mg total) by mouth every 8 (eight) hours as needed for nausea or vomiting (Patient not taking: Reported on 6/23/2020), Disp: 20 tablet, Rfl: 0    predniSONE 10 mg tablet, Six p.o. for 1 day then 5 p.o. for 1 day then 4 p.o. for 1 day then 3 po for 1 day and 2 p.o. for 1 day then 1 p.o. (Patient not taking: Reported on 6/28/2023), Disp: 21 tablet, Rfl: 0    Current Facility-Administered Medications:     ropivacaine (NAROPIN) injection 4 mL, 4 mL, Intra-articular, Titrated, , 4 mL at 07/25/24 1525    Patient Active Problem List   Diagnosis    Abnormality of esophagus    Malignant neoplasm of ovary (HCC)    Anxiety    Asthma    Benign essential hypertension    Chronic sinusitis    Coronary artery disease involving native coronary artery of native heart without angina pectoris    Environmental and seasonal allergies    Gastroesophageal reflux disease with esophagitis    Ground glass opacity present on imaging of lung    Hyperlipidemia    Hyperthyroidism    Lung nodule    Mild persistent asthma without complication    Multinodular non-toxic goiter    NSTEMI (non-ST elevated myocardial infarction) ([facility])    Osteoarthritis    Osteopenia    Pancreatic cyst    Pulmonary nodule, right    Vitamin D deficiency    Localized osteoporosis without current pathological fracture       Objective:  Ht 5' 5.25\" (1.657 m)   Wt 79.8 kg (176 lb)   BMI 29.06 kg/m²     Left Knee Exam     Other   Erythema: absent  Sensation: normal  Swelling: mild  Effusion: effusion present          Observations   Left Knee   Positive for effusion.       Physical Exam  Musculoskeletal:      Left knee: Effusion present.           Neurologic Exam    Procedures    I have personally reviewed the written report of the pertinent studies.             Past Medical History:   Diagnosis Date    Acute systolic CHF (congestive heart failure) ([facility]) 12/20/2017    Last Assessment & Plan:  " Euvolemic. NYHA class II. Change lopressor to toprol. On ACEI. Echo 40 days post ordered - she will be in [place] by then. We discussed if LVEF remains reduced (echo originally questioned stress induced CM) that goal  is to optimize medical therapy with ACEI and BB. If after three months of medical therapy, he LVEF remains reduced, AICD may be recommended. I stated it is concer    Allergic     Allergic rhinitis     Last Assessed:5/21/2015    Benign essential hypertension     Last Assessed:4/24/2014    Hypertension     Personal history of medical treatment 06/23/2020    Tachycardia     Last Assessed:12/22/2012       Past Surgical History:   Procedure Laterality Date    CARDIAC SURGERY  2017    HYSTERECTOMY      OOPHORECTOMY Bilateral     age 49    THYROIDECTOMY, PARTIAL  2016    US GUIDED THYROID BIOPSY  10/26/2015       Social History     Socioeconomic History    Marital status: /Civil Union     Spouse name: Not on file    Number of children: Not on file    Years of education: Not on file    Highest education level: Not on file   Occupational History    Not on file   Tobacco Use    Smoking status: Never    Smokeless tobacco: Never   Vaping Use    Vaping status: Never Used   Substance and Sexual Activity    Alcohol use: Not Currently     Comment: occasional-rare    Drug use: No    Sexual activity: Not Currently     Partners: Male   Other Topics Concern    Not on file   Social History Narrative    Not on file     Social Drivers of Health     Financial Resource Strain: Low Risk  (12/28/2022)    Overall Financial Resource Strain (CARDIA)     Difficulty of Paying Living Expenses: Not hard at all   Food Insecurity: No Food Insecurity (8/7/2024)    Nursing - Inadequate Food Risk Classification     Worried About Running Out of Food in the Last Year: Never true     Ran Out of Food in the Last Year: Never true     Ran Out of Food in the Last Year: Not on file   Transportation Needs: No Transportation Needs (8/7/2024)     PRAPARE - Transportation     Lack of Transportation (Medical): No     Lack of Transportation (Non-Medical): No   Physical Activity: Not on file   Stress: Not on file   Social Connections: Not on file   Intimate Partner Violence: Not on file   Housing Stability: Low Risk  (8/7/2024)    Housing Stability Vital Sign     Unable to Pay for Housing in the Last Year: No     Number of Times Moved in the Last Year: 1     Homeless in the Last Year: No       Family History   Problem Relation Age of Onset    Breast cancer Mother 52    Heart attack Father     Melanoma Daughter 18    No Known Problems Sister     No Known Problems Sister     No Known Problems Maternal Aunt     No Known Problems Maternal Grandmother     No Known Problems Maternal Grandfather     No Known Problems Paternal Grandmother     No Known Problems Paternal Grandfather     BRCA1 Negative Sister     Breast cancer Maternal Aunt         in 60's    No Known Problems Paternal Aunt

## 2024-12-27 ENCOUNTER — HOSPITAL ENCOUNTER (OUTPATIENT)
Dept: MRI IMAGING | Facility: HOSPITAL | Age: 84
End: 2024-12-27
Payer: MEDICARE

## 2024-12-27 DIAGNOSIS — M25.562 CHRONIC PAIN OF LEFT KNEE: ICD-10-CM

## 2024-12-27 DIAGNOSIS — M17.12 PRIMARY OSTEOARTHRITIS OF LEFT KNEE: ICD-10-CM

## 2024-12-27 DIAGNOSIS — G89.29 CHRONIC PAIN OF LEFT KNEE: ICD-10-CM

## 2024-12-27 PROCEDURE — 73721 MRI JNT OF LWR EXTRE W/O DYE: CPT

## 2024-12-28 LAB
ALBUMIN SERPL-MCNC: 4.2 G/DL (ref 3.6–5.1)
ALBUMIN/GLOB SERPL: 1.7 (CALC) (ref 1–2.5)
ALP SERPL-CCNC: 51 U/L (ref 37–153)
ALT SERPL-CCNC: 14 U/L (ref 6–29)
AST SERPL-CCNC: 20 U/L (ref 10–35)
BILIRUB SERPL-MCNC: 0.5 MG/DL (ref 0.2–1.2)
BUN SERPL-MCNC: 15 MG/DL (ref 7–25)
BUN/CREAT SERPL: 26 (CALC) (ref 6–22)
CALCIUM SERPL-MCNC: 9.4 MG/DL (ref 8.6–10.4)
CHLORIDE SERPL-SCNC: 105 MMOL/L (ref 98–110)
CHOLEST SERPL-MCNC: 201 MG/DL
CHOLEST/HDLC SERPL: 2.2 (CALC)
CO2 SERPL-SCNC: 31 MMOL/L (ref 20–32)
CREAT SERPL-MCNC: 0.58 MG/DL (ref 0.6–0.95)
GFR/BSA.PRED SERPLBLD CYS-BASED-ARV: 89 ML/MIN/1.73M2
GLOBULIN SER CALC-MCNC: 2.5 G/DL (CALC) (ref 1.9–3.7)
GLUCOSE SERPL-MCNC: 87 MG/DL (ref 65–99)
HDLC SERPL-MCNC: 91 MG/DL
LDLC SERPL CALC-MCNC: 93 MG/DL (CALC)
NONHDLC SERPL-MCNC: 110 MG/DL (CALC)
POTASSIUM SERPL-SCNC: 4 MMOL/L (ref 3.5–5.3)
PROT SERPL-MCNC: 6.7 G/DL (ref 6.1–8.1)
SODIUM SERPL-SCNC: 142 MMOL/L (ref 135–146)
TRIGL SERPL-MCNC: 82 MG/DL
TSH SERPL-ACNC: 1.16 MIU/L (ref 0.4–4.5)

## 2024-12-30 ENCOUNTER — RESULTS FOLLOW-UP (OUTPATIENT)
Dept: FAMILY MEDICINE CLINIC | Facility: CLINIC | Age: 84
End: 2024-12-30

## 2024-12-30 ENCOUNTER — CLINICAL SUPPORT (OUTPATIENT)
Dept: FAMILY MEDICINE CLINIC | Facility: CLINIC | Age: 84
End: 2024-12-30
Payer: MEDICARE

## 2024-12-30 DIAGNOSIS — M81.0 OSTEOPOROSIS WITHOUT CURRENT PATHOLOGICAL FRACTURE, UNSPECIFIED OSTEOPOROSIS TYPE: Primary | ICD-10-CM

## 2024-12-30 PROCEDURE — 96372 THER/PROPH/DIAG INJ SC/IM: CPT

## 2025-01-02 ENCOUNTER — TELEPHONE (OUTPATIENT)
Age: 85
End: 2025-01-02

## 2025-01-02 NOTE — TELEPHONE ENCOUNTER
Caller: Patient    Doctor: Latosha    Reason for call: The patient is leaving for [place] 1/3/25. Will be back in [place] in April. Request cb to discuss the MRI results. Request copy of the MRI to take along to [place]. Provided tel# for Medical Records and transferred    Call back#: [phone]

## 2025-01-02 NOTE — TELEPHONE ENCOUNTER
Spoke with patient over the phone.  She is coming to the office tomorrow around 11 to  her MRI knee report.  Provided the number for medical records to obtain the images on CD.

## 2025-01-07 ENCOUNTER — TELEPHONE (OUTPATIENT)
Dept: FAMILY MEDICINE CLINIC | Facility: CLINIC | Age: 85
End: 2025-01-07

## 2025-01-07 NOTE — TELEPHONE ENCOUNTER
Called pt and lvm to reschedule 4/9/25 appointment with Dr. Jaramillo as provider will be out of the office.   
No

## 2025-01-24 DIAGNOSIS — J32.9 CHRONIC SINUSITIS, UNSPECIFIED LOCATION: ICD-10-CM

## 2025-01-24 RX ORDER — FLUTICASONE FUROATE AND VILANTEROL TRIFENATATE 200; 25 UG/1; UG/1
1 POWDER RESPIRATORY (INHALATION) DAILY
Qty: 200 EACH | Refills: 0 | Status: SHIPPED | OUTPATIENT
Start: 2025-01-24

## 2025-01-24 NOTE — TELEPHONE ENCOUNTER
Reason for call:   [x] Refill   [] Prior Auth  [] Other:     Office:   [x] PCP/Provider -   [] Specialty/Provider -     Medication: Breo Ellipta 200-25 MCG/ACT inhaler     Dose/Frequency:  INHALE 1 PUFF BY MOUTH DAILY.     Quantity: 200 EACH    Pharmacy: [facility] #[id number] [place], [place]      Does the patient have enough for 3 days?   [x] Yes   [] No - Send as HP to POD

## 2025-01-27 DIAGNOSIS — I10 BENIGN ESSENTIAL HYPERTENSION: Primary | ICD-10-CM

## 2025-01-30 DIAGNOSIS — J32.9 CHRONIC SINUSITIS, UNSPECIFIED LOCATION: ICD-10-CM

## 2025-01-30 RX ORDER — FLUTICASONE FUROATE AND VILANTEROL TRIFENATATE 200; 25 UG/1; UG/1
1 POWDER RESPIRATORY (INHALATION) DAILY
Qty: 180 BLISTER | Refills: 0 | Status: SHIPPED | OUTPATIENT
Start: 2025-01-30

## 2025-01-30 NOTE — TELEPHONE ENCOUNTER
Patient called Rx refill line and states leslye leonard only gave her a partial fill on her breo inhaler even though it was sent for 200 days supply - advised pt we will resend for 90 days supply because thats what her plan prefers

## 2025-01-31 ENCOUNTER — TELEPHONE (OUTPATIENT)
Age: 85
End: 2025-01-31

## 2025-01-31 DIAGNOSIS — M25.562 CHRONIC PAIN OF LEFT KNEE: Primary | ICD-10-CM

## 2025-01-31 DIAGNOSIS — G89.29 CHRONIC PAIN OF LEFT KNEE: Primary | ICD-10-CM

## 2025-01-31 NOTE — TELEPHONE ENCOUNTER
Pt called and was requesting a PT referral for her Lt knee.  She is currently in [place] and will be there until April.    States she has had a referral sent to [place] before.    Referral to [facility], [facility]  Fax to [phone]    Please advise if able to send referral either by mychart or phone call [phone]

## 2025-03-03 DIAGNOSIS — I10 BENIGN ESSENTIAL HYPERTENSION: ICD-10-CM

## 2025-03-03 RX ORDER — LISINOPRIL 10 MG/1
10 TABLET ORAL DAILY
Qty: 90 TABLET | Refills: 0 | Status: SHIPPED | OUTPATIENT
Start: 2025-03-03

## 2025-03-03 NOTE — TELEPHONE ENCOUNTER
Reason for call:   [x] Refill   [] Prior Auth  [] Other:     Office:   [x] PCP/Provider -Tung Ramos  [] Specialty/Provider -     Medication: Lisinopril 10mg    Dose/Frequency: 1 tab daily     Quantity: 90    Pharmacy: [facility] #[id number] [facility] [address] [facility] [phone]     Does the patient have enough for 3 days?   [] Yes   [x] No - Send as HP to POD

## 2025-03-21 DIAGNOSIS — M19.90 ARTHRITIS: ICD-10-CM

## 2025-03-21 DIAGNOSIS — E78.2 MIXED HYPERLIPIDEMIA: ICD-10-CM

## 2025-03-21 RX ORDER — CELECOXIB 200 MG/1
200 CAPSULE ORAL DAILY
Qty: 90 CAPSULE | Refills: 1 | Status: SHIPPED | OUTPATIENT
Start: 2025-03-21

## 2025-03-21 RX ORDER — ATORVASTATIN CALCIUM 20 MG/1
40 TABLET, FILM COATED ORAL DAILY
Qty: 180 TABLET | Refills: 1 | Status: SHIPPED | OUTPATIENT
Start: 2025-03-21

## 2025-03-21 NOTE — TELEPHONE ENCOUNTER
Reason for call:   [x] Refill   [] Prior Auth  [] Other:     Office:   [x] PCP/Provider - [facility]  Authorized By: Tung Jaramillo DO    [] Specialty/Provider -     Medication: atorvastatin (LIPITOR) 20 mg tablet    Dose/Frequency: TAKE 2 TABLETS(40 MG) BY MOUTH DAILY,    Quantity: 180 tablet    Pharmacy: [facility] [address] [place] [address] Pharmacy   Does the patient have enough for 3 days?   [x] Yes   [] No - Send as HP to POD    Mail Away Pharmacy   Does the patient have enough for 10 days?   [] Yes   [] No - Send as HP to POD    Reason for call:   [x] Refill   [] Prior Auth  [] Other:     Office:   [x] PCP/Provider - [facility]  Authorized By: Tung Jaramillo DO    [] Specialty/Provider -     Medication: celecoxib (CeleBREX) 200 mg capsule    Dose/Frequency: Take 1 capsule (200 mg total) by mouth daily    Quantity: 90 capsule    Pharmacy: [facility] [address] [place] [address] Pharmacy   Does the patient have enough for 3 days?   [x] Yes   [] No - Send as HP to POD    Mail Away Pharmacy   Does the patient have enough for 10 days?   [] Yes   [] No - Send as HP to POD

## 2025-04-07 ENCOUNTER — APPOINTMENT (OUTPATIENT)
Dept: RADIOLOGY | Facility: MEDICAL CENTER | Age: 85
End: 2025-04-07
Payer: MEDICARE

## 2025-04-07 ENCOUNTER — OFFICE VISIT (OUTPATIENT)
Dept: OBGYN CLINIC | Facility: MEDICAL CENTER | Age: 85
End: 2025-04-07
Payer: MEDICARE

## 2025-04-07 VITALS — HEIGHT: 65 IN | BODY MASS INDEX: 29.32 KG/M2 | WEIGHT: 176 LBS

## 2025-04-07 DIAGNOSIS — M25.562 LEFT KNEE PAIN, UNSPECIFIED CHRONICITY: ICD-10-CM

## 2025-04-07 DIAGNOSIS — Z01.89 ENCOUNTER FOR LOWER EXTREMITY COMPARISON IMAGING STUDY: ICD-10-CM

## 2025-04-07 DIAGNOSIS — M25.562 LEFT KNEE PAIN, UNSPECIFIED CHRONICITY: Primary | ICD-10-CM

## 2025-04-07 PROCEDURE — 73562 X-RAY EXAM OF KNEE 3: CPT

## 2025-04-07 PROCEDURE — 99204 OFFICE O/P NEW MOD 45 MIN: CPT | Performed by: ORTHOPAEDIC SURGERY

## 2025-04-07 PROCEDURE — 73564 X-RAY EXAM KNEE 4 OR MORE: CPT

## 2025-04-07 NOTE — PROGRESS NOTES
"Orthopaedic Surgery - Office Note  Mary Mckenna (85 y.o. female)   : 1940   MRN: [id number]  Encounter Date: 2025    Assessment / Plan    Left knee Primary Osteoarthritis    Discussed etiology and pathomechanics of osteoarthritis.  Discussed surgical and non-surgical interventions.  Given she has tried and failed with CSI and visco, she would like to move forward with PRP injection. Risks and benefits were discussed. Order placed at today's visit.  Continue with ice and OTC pain medication as needed.  WBAT, activity as tolerated using pain as a guide.  Follow-up:  Return for follow up with Dr. Humphrey for PRP injection.      Chief Complaint / Date of Onset  Left knee pain - chronic  Injury Mechanism / Date  None  Surgery / Date  None    History of Present Illness   Mary Mckenna is a 85 y.o. female who presents for initial evaluation of left knee karel.  She states this pain is been ongoing for several years.  She has previously been treated by my colleague Dr. Reina and has been receiving Visco injections, as well as cortisone injections with minimal relief. She is here asking for information on PRP injections. She denies any trauma to the left knee. She denies any numbness or tingling.    Treatment Summary  Medications / Modalities  Current OTC anti-inflammatories  Bracing / Immobilization  None  Physical Therapy  Current with HEP  Injections  2024 - L Knee CSI  10/25/2024 - L Knee CSI  2024 - L Knee Visco  2024 - L Knee CSI  Prior Surgeries  None  Other Treatments  None    Employment / Current Status  Retired     Sport / Organization / Current Status  unknown      Review of Systems  Pertinent items are noted in HPI.  All other systems were reviewed and are negative.      Physical Exam  Ht 5' 5.25\" (1.657 m)   Wt 79.8 kg (176 lb)   BMI 29.06 kg/m²   Cons: Appears well.  No apparent distress.  Psych: Alert. Oriented x3.  Mood and affect normal.  Eyes: PERRLA, EOMI  Resp: Normal " effort.  No audible wheezing or stridor.  CV: Palpable pulse.  No discernable arrhythmia.  No LE edema.  Lymph:  No palpable cervical, axillary, or inguinal lymphadenopathy.  Skin: Warm.  No palpable masses.  No visible lesions.  Neuro: Normal muscle tone.  Normal and symmetric DTR's.     Left Knee Exam  Alignment:  Normal knee alignment.  Inspection:  No swelling. No edema. No erythema. No ecchymosis.  Palpation:  No tenderness. Mild tenderness at medial joint line.  ROM:  Knee Extension 0. Knee Flexion 110.  Strength:  Not tested.  Stability:  No objective knee instability. Stable Varus / Valgus stress, Lachman, and Posterior drawer.  Tests:  No pertinent positive or negative tests.  Patella:  Patella tracks centrally with crepitus.  Neurovascular:  Sensation intact in DP/SP/Jenkins/Sa/T nerve distributions. Sensation intact L1-S1 BLE.  2+ DP & PT pulses.  Gait:  Steady with cane.       Studies Reviewed  XR of left knee - severe tricompartmental degenerative changes of the left knee including significant joint space narrowing of the medial compartment with chondrocalcinosis present.  She also has sclerotic changes to both the tibia and the femur.  There are osteophytes along the tibial plateau and femoral condyles, as well as the patella.  No acute osseous abnormalities.      Procedures  No procedures today.    Medical, Surgical, Family, and Social History  The patient's medical history, family history, and social history, were reviewed and updated as appropriate.    Past Medical History:   Diagnosis Date    Acute systolic CHF (congestive heart failure) ([facility]) 12/20/2017    Last Assessment & Plan:  Euvolemic. NYHA class II. Change lopressor to toprol. On ACEI. Echo 40 days post ordered - she will be in [place] by then. We discussed if LVEF remains reduced (echo originally questioned stress induced CM) that goal  is to optimize medical therapy with ACEI and BB. If after three months of medical therapy, he LVEF remains  reduced, AICD may be recommended. I stated it is concer    Allergic     Allergic rhinitis     Last Assessed:5/21/2015    Benign essential hypertension     Last Assessed:4/24/2014    Hypertension     Personal history of medical treatment 06/23/2020    Tachycardia     Last Assessed:12/22/2012       Past Surgical History:   Procedure Laterality Date    CARDIAC SURGERY  2017    HYSTERECTOMY      OOPHORECTOMY Bilateral     age 49    THYROIDECTOMY, PARTIAL  2016    US GUIDED THYROID BIOPSY  10/26/2015       Family History   Problem Relation Age of Onset    Breast cancer Mother 52    Heart attack Father     Melanoma Daughter 18    No Known Problems Sister     No Known Problems Sister     No Known Problems Maternal Aunt     No Known Problems Maternal Grandmother     No Known Problems Maternal Grandfather     No Known Problems Paternal Grandmother     No Known Problems Paternal Grandfather     BRCA1 Negative Sister     Breast cancer Maternal Aunt         in 60's    No Known Problems Paternal Aunt        Social History     Occupational History    Not on file   Tobacco Use    Smoking status: Never    Smokeless tobacco: Never   Vaping Use    Vaping status: Never Used   Substance and Sexual Activity    Alcohol use: Not Currently     Comment: occasional-rare    Drug use: No    Sexual activity: Not Currently     Partners: Male       Allergies   Allergen Reactions    Cat Dander     Molds & Smuts     Other     Pollen Extract          Current Outpatient Medications:     acetaminophen-codeine (TYLENOL with CODEINE #3) 300-30 MG per tablet, Take 1 tablet by mouth every 8 (eight) hours as needed for moderate pain, Disp: 30 tablet, Rfl: 0    atorvastatin (LIPITOR) 20 mg tablet, Take 2 tablets (40 mg total) by mouth daily, Disp: 180 tablet, Rfl: 1    Breo Ellipta 200-25 MCG/ACT inhaler, Inhale 1 puff daily Rinse mouth after use., Disp: 180 blister, Rfl: 0    budesonide (PULMICORT) 0.5 mg/2 mL nebulizer solution, EMPTY ONE RESPULE INTO  YOUR SINUS RINSE BOTTLE AND LAVAGE NOSTRILS BID, Disp: , Rfl: 3    celecoxib (CeleBREX) 200 mg capsule, Take 1 capsule (200 mg total) by mouth daily, Disp: 90 capsule, Rfl: 1    cetirizine (ZyrTEC) 10 mg tablet, TK 1 T PO NIGHTLY, Disp: , Rfl: 3    Cholecalciferol 125 MCG (5000 UT) capsule, Take 2,000 Units by mouth daily  , Disp: , Rfl:     Dupixent 300 MG/2ML SOPN, every 14 (fourteen) days, Disp: , Rfl:     fluticasone (FLONASE) 50 mcg/act nasal spray, 2 sprays into each nostril daily Shake liquid and spray, Disp: 48 g, Rfl: 3    lisinopril (ZESTRIL) 10 mg tablet, Take 1 tablet (10 mg total) by mouth daily, Disp: 90 tablet, Rfl: 0    multivitamin (THERAGRAN) TABS, Take 1 tablet by mouth daily, Disp: , Rfl:     albuterol (PROVENTIL HFA,VENTOLIN HFA) 90 mcg/act inhaler, INHALE 2 PUFFS BY MOUTH EVERY 4 HOURS AS NEEDED FOR WHEEZING (Patient not taking: Reported on 4/7/2025), Disp: 25.5 g, Rfl: 1    ALPRAZolam (XANAX) 0.25 mg tablet, Take 1 tablet by mouth 2 (two) times a day as needed (Patient not taking: Reported on 6/21/2021), Disp: , Rfl:     aspirin (ECOTRIN LOW STRENGTH) 81 mg EC tablet, Take 1 tablet by mouth daily (Patient not taking: Reported on 12/22/2023), Disp: , Rfl:     denosumab (PROLIA) 60 mg/mL, Inject 1 mL (60 mg total) under the skin once for 1 dose, Disp: 1 mL, Rfl: 0    fluticasone-vilanterol 200-25 mcg/actuation inhaler, Inhale 1 puff daily Rinse mouth after use., Disp: 60 blister, Rfl: 5    Glucosamine-Chondroitin 500-400 MG CAPS, Take 1 tablet by mouth daily (Patient not taking: Reported on 12/22/2023), Disp: , Rfl:     ipratropium (ATROVENT) 0.02 % nebulizer solution, Inhale 1 each 4 (four) times a day as needed   (Patient not taking: Reported on 12/22/2023), Disp: , Rfl:     ondansetron (ZOFRAN) 4 mg tablet, Take 1 tablet (4 mg total) by mouth every 8 (eight) hours as needed for nausea or vomiting (Patient not taking: Reported on 6/23/2020), Disp: 20 tablet, Rfl: 0    predniSONE 10 mg tablet,  Six p.o. for 1 day then 5 p.o. for 1 day then 4 p.o. for 1 day then 3 po for 1 day and 2 p.o. for 1 day then 1 p.o. (Patient not taking: Reported on 4/7/2025), Disp: 21 tablet, Rfl: 0    Current Facility-Administered Medications:     ropivacaine (NAROPIN) injection 4 mL, 4 mL, Intra-articular, Titrated, , 4 mL at 07/25/24 1525      Kenneth Garsia    Scribe Attestation      I,:  Kenneth Garsia am acting as a scribe while in the presence of the attending physician.:       I,:  Jeremy Humphrey MD personally performed the services described in this documentation    as scribed in my presence.:

## 2025-04-14 ENCOUNTER — OFFICE VISIT (OUTPATIENT)
Dept: FAMILY MEDICINE CLINIC | Facility: CLINIC | Age: 85
End: 2025-04-14
Payer: MEDICARE

## 2025-04-14 VITALS
WEIGHT: 175 LBS | SYSTOLIC BLOOD PRESSURE: 134 MMHG | HEART RATE: 91 BPM | BODY MASS INDEX: 29.16 KG/M2 | HEIGHT: 65 IN | DIASTOLIC BLOOD PRESSURE: 82 MMHG | OXYGEN SATURATION: 99 % | TEMPERATURE: 96.7 F

## 2025-04-14 DIAGNOSIS — K21.00 GASTROESOPHAGEAL REFLUX DISEASE WITH ESOPHAGITIS WITHOUT HEMORRHAGE: ICD-10-CM

## 2025-04-14 DIAGNOSIS — J32.9 CHRONIC SINUSITIS, UNSPECIFIED LOCATION: ICD-10-CM

## 2025-04-14 DIAGNOSIS — I25.10 CORONARY ARTERY DISEASE INVOLVING NATIVE CORONARY ARTERY OF NATIVE HEART WITHOUT ANGINA PECTORIS: ICD-10-CM

## 2025-04-14 DIAGNOSIS — I10 BENIGN ESSENTIAL HYPERTENSION: Primary | ICD-10-CM

## 2025-04-14 DIAGNOSIS — C56.9 MALIGNANT NEOPLASM OF OVARY, UNSPECIFIED LATERALITY (HCC): ICD-10-CM

## 2025-04-14 DIAGNOSIS — E78.2 MIXED HYPERLIPIDEMIA: ICD-10-CM

## 2025-04-14 DIAGNOSIS — J45.30 MILD PERSISTENT ASTHMA WITHOUT COMPLICATION: ICD-10-CM

## 2025-04-14 DIAGNOSIS — E05.90 HYPERTHYROIDISM: ICD-10-CM

## 2025-04-14 DIAGNOSIS — I21.4 NSTEMI (NON-ST ELEVATED MYOCARDIAL INFARCTION) (HCC): ICD-10-CM

## 2025-04-14 DIAGNOSIS — M19.90 ARTHRITIS: ICD-10-CM

## 2025-04-14 PROCEDURE — 99214 OFFICE O/P EST MOD 30 MIN: CPT | Performed by: FAMILY MEDICINE

## 2025-04-14 PROCEDURE — G2211 COMPLEX E/M VISIT ADD ON: HCPCS | Performed by: FAMILY MEDICINE

## 2025-04-14 RX ORDER — LISINOPRIL 10 MG/1
10 TABLET ORAL DAILY
Qty: 90 TABLET | Refills: 1 | Status: SHIPPED | OUTPATIENT
Start: 2025-04-14

## 2025-04-14 RX ORDER — CELECOXIB 200 MG/1
200 CAPSULE ORAL DAILY
Qty: 90 CAPSULE | Refills: 1 | Status: SHIPPED | OUTPATIENT
Start: 2025-04-14

## 2025-04-14 RX ORDER — FLUTICASONE FUROATE AND VILANTEROL 200; 25 UG/1; UG/1
1 POWDER RESPIRATORY (INHALATION) DAILY
Qty: 180 BLISTER | Refills: 0 | Status: SHIPPED | OUTPATIENT
Start: 2025-04-14

## 2025-04-14 NOTE — ASSESSMENT & PLAN NOTE
Continue atorvastatin 40 mg daily.  Most recent total cholesterol 201 with an LDL of 93 and HDL of 91.

## 2025-04-14 NOTE — ASSESSMENT & PLAN NOTE
Orders:  •  lisinopril (ZESTRIL) 10 mg tablet; Take 1 tablet (10 mg total) by mouth daily       Neuro  #Post-tenecteplase monitoring   - No antiplatelet, anticoagulation, and heparin sq until 24 hour CT head negative for bleed.  - Repeat CT head noncon 24 hours post-tenecteplase bolus to rule out bleed  - Stroke neuro checks and vitals every 15 minutes for first 2 hours post- tenecteplase bolus; every 30 minutes for the next 6 hours; then hourly until 24 hours post treatment  - Keep BP <180/105, notify provider if out of range  - Please perform dysphagia screen now, if passess may be NPO except meds for 6 hours post tenecteplase  - Repeat CT head with any acute change in mental status.  - Minimize arterial and venous punctures, able to draw blood 2hr s/p tenecteplase bolus  - Keep temp <100F  - Notify provider for "HR >100 or <55 or SaO2 <95%"  - Maintain strict bed rest for 12 hours with HOB <30 degrees  - After 12hr s/p tenecteplase, patient able to ambulate with assist    #CVA workup  - Once dysphagia screen passed, start atorvastatin 80 once daily  - Obtain stroke core labs: Hemoglobin A1c, Lipid profile set, and TSH  - MRI brain without contrast  - echo with bubble, may eventually need DEBBIE  - PT/OT order wtihin 24 hours   - Swallow evaluation if fails dysphagia screen  - SLP order if patient with dysarthria  - Stroke Code HCT Results:   - Stroke Code CTA Results:  - Stroke education    Cards  #HTN  - goal sBP as above per post-tenecteplase protocol   - hold home blood pressure medication for now  - Stroke Code EKG Results:    #HLD  - high dose statin as above in CVA  - LDL results:     Pulm  - call provider if SPO2 < 94%    GI  #Nutrition/Fluids/Electrolytes   - replete K<4 and Mg <2  - Diet:  - IVF: avoid fluids containing dextrose     Renal  - monitor and trend Cr    Infectious Disease  - Stroke Code CXR results:   - afebrile on admission, no leukocytosis    Endocrine  #DM  - A1C results:   - Maintain glucose 140-180    - TSH results:    DVT Prophylaxis  - hold chemoprophylaxis s/p tenecteplase bolus  - SCDs for DVT prophylaxis     Dispo: Pending PT/OT evaluation     Discussed daily hospital plans and goals with patient and family at bedside. (Called and updated family.)    Discussed with Neurology Attending  71y Male with PMHx of HTN, BPH, HLD, coming in with left sided weakness and right gaze preference, consistent with right MCA syndrome. CT head with no hemorrhage but does have hyperdense right M1, confirmed on CTA with large perfusion deficits on CTP. Pt given Tenecteplase on 3/24 at 8:46AM and transferred up to thrombectomy suite. Pt will need full stroke workup.    Neuro  #Post-tenecteplase monitoring   - No antiplatelet, anticoagulation, and heparin sq until 24 hour CT head negative for bleed.  - Repeat CT head noncon 24 hours post-tenecteplase bolus (3/25 around 8:45AM) to rule out bleed  - Stroke neuro checks and vitals every 15 minutes for first 2 hours post- tenecteplase bolus; every 30 minutes for the next 6 hours; then hourly until 24 hours post treatment  - Keep BP <180/105, notify provider if out of range  - Repeat CT head with any acute change in mental status.  - Minimize arterial and venous punctures, able to draw blood 2hr s/p tenecteplase bolus  - Keep temp <100F  - Notify provider for "HR >100 or <55 or SaO2 <95%"  - Maintain strict bed rest for 12 hours with HOB <30 degrees  - After 12hr s/p tenecteplase, patient able to ambulate with assist  - post thrombectomy care per NSICU    #CVA workup  - Once dysphagia screen passed, start atorvastatin 80 once daily  - Obtain stroke core labs: Hemoglobin A1c, Lipid profile set, and TSH  - MRI brain without contrast  - echo with bubble, may eventually need DEBBIE  - PT/OT order wtihin 24 hours   - Swallow evaluation if fails dysphagia screen  - SLP order  - Stroke Code HCT Results: no hemorrhage, hyperdense right M1  - Stroke Code CTA Results: right M1 occlusion  - Stroke education    Cards  #HTN  - goal sBP as above per post-tenecteplase protocol   - hold home blood pressure medication for now  - Stroke Code EKG Results:    #HLD  - high dose statin as above in CVA  - LDL results:     Pulm  - call provider if SPO2 < 94%    GI  #Nutrition/Fluids/Electrolytes   - replete K<4 and Mg <2  - Diet:  - IVF: avoid fluids containing dextrose     Renal  - monitor and trend Cr    Infectious Disease  - Stroke Code CXR results:   - afebrile on admission, no leukocytosis    Endocrine  #DM  - A1C results:   - Maintain glucose 140-180    - TSH results:    DVT Prophylaxis  - hold chemoprophylaxis s/p tenecteplase bolus  - SCDs for DVT prophylaxis     Dispo: Pending PT/OT evaluation     Discussed daily hospital plans and goals with patient and family at bedside.     Discussed with Neurology Attending

## 2025-04-14 NOTE — PROGRESS NOTES
Name: Mary Mckenna      : 1940      MRN: [id number]  Encounter Provider: Tung Jaramillo DO  Encounter Date: 2025   Encounter department: [facility]    Assessment & Plan  Benign essential hypertension    Orders:  •  lisinopril (ZESTRIL) 10 mg tablet; Take 1 tablet (10 mg total) by mouth daily    Coronary artery disease involving native coronary artery of native heart without angina pectoris  Asymptomatic.  Continue management of risk factors.       Gastroesophageal reflux disease with esophagitis without hemorrhage  Reflux stable.       Mixed hyperlipidemia  Continue atorvastatin 40 mg daily.  Most recent total cholesterol 201 with an LDL of 93 and HDL of 91.       Hyperthyroidism  TSH normal in December       Malignant neoplasm of ovary, unspecified laterality (HCC)  Continue routine surveillance.  No evidence at this time of any recurrence       NSTEMI (non-ST elevated myocardial infarction) (HCC)  Asymptomatic.  Continue follow-up with cardiology as needed       Arthritis  Continue Celebrex for management of symptoms.  Orders:  •  celecoxib (CeleBREX) 200 mg capsule; Take 1 capsule (200 mg total) by mouth daily    Chronic sinusitis, unspecified location    Orders:  •  fluticasone-vilanterol (Breo Ellipta) 200-25 mcg/actuation inhaler; Inhale 1 puff daily Rinse mouth after use.    Mild persistent asthma without complication  Continue Breo inhaler for maintenance.  Can use Pulmicort nebulizer as needed, continue Dupixent per ENT in [place]            History of Present Illness     Patient presents for follow-up of chronic medical problems.  She is being treated for osteoarthritis, asthma, history of malignant neoplasm of her ovary, hypothyroidism, hyperlipidemia and coronary artery disease.  She is also being treated for hypertension.  Her main concern today is her knee pain.  She has been seeing orthopedics and has scheduled procedure for PRP to her left knee in the near  future.      Review of Systems   Constitutional: Negative.    Respiratory: Negative.     Cardiovascular: Negative.    Gastrointestinal: Negative.    Genitourinary: Negative.    Musculoskeletal: Negative.    Psychiatric/Behavioral: Negative.       Past Medical History:   Diagnosis Date   • Acute systolic CHF (congestive heart failure) ([facility]) 12/20/2017    Last Assessment & Plan:  Euvolemic. NYHA class II. Change lopressor to toprol. On ACEI. Echo 40 days post ordered - she will be in [place] by then. We discussed if LVEF remains reduced (echo originally questioned stress induced CM) that goal  is to optimize medical therapy with ACEI and BB. If after three months of medical therapy, he LVEF remains reduced, AICD may be recommended. I stated it is concer   • Allergic    • Allergic rhinitis     Last Assessed:5/21/2015   • Benign essential hypertension     Last Assessed:4/24/2014   • Hypertension    • NSTEMI (non-ST elevated myocardial infarction) ([facility]) 12/05/2017    Last Assessment & Plan:   Denies anginal symptoms. On DAPT + high intensity statin therapy. We discussed the importance of taking Plavix and Aspirin everyday as prescribed for a minimum of one year without interruption.  Aspirin will be continued indefinitely.  Metoprolol and Lisinopril. LVEF is now normal.   Discussed s/s to seek medical attention.     I have stopped pts Zetia.      • Personal history of medical treatment 06/23/2020   • Tachycardia     Last Assessed:12/22/2012     Past Surgical History:   Procedure Laterality Date   • CARDIAC SURGERY  2017   • FOOT SURGERY     • HYSTERECTOMY     • OOPHORECTOMY Bilateral     age 49   • THYROIDECTOMY, PARTIAL  2016   • US GUIDED THYROID BIOPSY  10/26/2015     Family History   Problem Relation Age of Onset   • Breast cancer Mother 52   • Heart attack Father    • Melanoma Daughter 18   • No Known Problems Sister    • No Known Problems Sister    • No Known Problems Maternal Aunt    • No Known Problems Maternal  Grandmother    • No Known Problems Maternal Grandfather    • No Known Problems Paternal Grandmother    • No Known Problems Paternal Grandfather    • BRCA1 Negative Sister    • Breast cancer Maternal Aunt         in 60's   • No Known Problems Paternal Aunt      Social History     Tobacco Use   • Smoking status: Never   • Smokeless tobacco: Never   Vaping Use   • Vaping status: Never Used   Substance and Sexual Activity   • Alcohol use: Not Currently     Comment: occasional-rare   • Drug use: No   • Sexual activity: Not Currently     Partners: Male     Current Outpatient Medications on File Prior to Visit   Medication Sig   • acetaminophen-codeine (TYLENOL with CODEINE #3) 300-30 MG per tablet Take 1 tablet by mouth every 8 (eight) hours as needed for moderate pain   • atorvastatin (LIPITOR) 20 mg tablet Take 2 tablets (40 mg total) by mouth daily   • budesonide (PULMICORT) 0.5 mg/2 mL nebulizer solution EMPTY ONE RESPULE INTO YOUR SINUS RINSE BOTTLE AND LAVAGE NOSTRILS BID   • cetirizine (ZyrTEC) 10 mg tablet TK 1 T PO NIGHTLY   • Cholecalciferol 125 MCG (5000 UT) capsule Take 2,000 Units by mouth daily     • Dupixent 300 MG/2ML SOPN every 14 (fourteen) days   • fluticasone (FLONASE) 50 mcg/act nasal spray 2 sprays into each nostril daily Shake liquid and spray   • multivitamin (THERAGRAN) TABS Take 1 tablet by mouth daily   • [DISCONTINUED] Breo Ellipta 200-25 MCG/ACT inhaler Inhale 1 puff daily Rinse mouth after use.   • [DISCONTINUED] celecoxib (CeleBREX) 200 mg capsule Take 1 capsule (200 mg total) by mouth daily   • [DISCONTINUED] lisinopril (ZESTRIL) 10 mg tablet Take 1 tablet (10 mg total) by mouth daily   • albuterol (PROVENTIL HFA,VENTOLIN HFA) 90 mcg/act inhaler INHALE 2 PUFFS BY MOUTH EVERY 4 HOURS AS NEEDED FOR WHEEZING (Patient not taking: Reported on 4/14/2025)   • ALPRAZolam (XANAX) 0.25 mg tablet Take 1 tablet by mouth 2 (two) times a day as needed (Patient not taking: Reported on 6/21/2021)   •  "aspirin (ECOTRIN LOW STRENGTH) 81 mg EC tablet Take 1 tablet by mouth daily (Patient not taking: Reported on 12/22/2023)   • denosumab (PROLIA) 60 mg/mL Inject 1 mL (60 mg total) under the skin once for 1 dose   • fluticasone-vilanterol 200-25 mcg/actuation inhaler Inhale 1 puff daily Rinse mouth after use.   • Glucosamine-Chondroitin 500-400 MG CAPS Take 1 tablet by mouth daily (Patient not taking: Reported on 12/22/2023)   • ipratropium (ATROVENT) 0.02 % nebulizer solution Inhale 1 each 4 (four) times a day as needed   (Patient not taking: Reported on 12/22/2023)   • ondansetron (ZOFRAN) 4 mg tablet Take 1 tablet (4 mg total) by mouth every 8 (eight) hours as needed for nausea or vomiting (Patient not taking: Reported on 6/23/2020)   • predniSONE 10 mg tablet Six p.o. for 1 day then 5 p.o. for 1 day then 4 p.o. for 1 day then 3 po for 1 day and 2 p.o. for 1 day then 1 p.o. (Patient not taking: Reported on 6/28/2023)     Allergies   Allergen Reactions   • Cat Dander    • Molds & Smuts    • Other    • Pollen Extract      Immunization History   Administered Date(s) Administered   • COVID-19 MODERNA VACC 0.5 ML IM 01/31/2021, 02/28/2021, 10/29/2021   • H1N1, All Formulations 01/11/2010   • INFLUENZA 10/02/2013, 10/24/2014, 03/23/2015, 10/19/2015, 10/31/2016, 11/14/2016, 10/11/2017, 12/01/2023   • Influenza Split High Dose Preservative Free IM 10/19/2014, 10/28/2015, 10/31/2016, 10/11/2017, 09/21/2020   • Influenza, high dose seasonal 0.7 mL 10/17/2018   • Influenza, injectable, quadrivalent, preservative free 0.5 mL 10/30/2019   • Influenza, seasonal, injectable 10/26/2012, 10/16/2013   • Pneumococcal Conjugate 13-Valent 05/21/2015   • Pneumococcal Polysaccharide PPV23 11/09/2005, 03/16/2015   • Zoster 01/01/2012   • Zoster Vaccine Recombinant 02/06/2019, 06/26/2019     Objective   /82   Pulse 91   Temp (!) 96.7 °F (35.9 °C)   Ht 5' 5.25\" (1.657 m)   Wt 79.4 kg (175 lb)   SpO2 99%   BMI 28.90 kg/m² "     Physical Exam

## 2025-04-14 NOTE — ASSESSMENT & PLAN NOTE
Orders:  •  fluticasone-vilanterol (Breo Ellipta) 200-25 mcg/actuation inhaler; Inhale 1 puff daily Rinse mouth after use.

## 2025-04-14 NOTE — ASSESSMENT & PLAN NOTE
Continue Breo inhaler for maintenance.  Can use Pulmicort nebulizer as needed, continue Dupixent per ENT in [place]

## 2025-04-16 ENCOUNTER — TELEPHONE (OUTPATIENT)
Dept: OBGYN CLINIC | Facility: MEDICAL CENTER | Age: 85
End: 2025-04-16

## 2025-04-16 NOTE — TELEPHONE ENCOUNTER
Called and spoke to patient regarding her upcoming appt. Patient is having a PRP injection on 4/18/25. I just reminded the patient that she needs to bring along $175 either in cash or a check to the appt. Patient showed understanding and was appreciative for the reminder call.

## 2025-04-17 DIAGNOSIS — M17.12 PRIMARY OSTEOARTHRITIS OF LEFT KNEE: Primary | ICD-10-CM

## 2025-04-18 ENCOUNTER — APPOINTMENT (OUTPATIENT)
Dept: LAB | Facility: MEDICAL CENTER | Age: 85
End: 2025-04-18
Attending: ORTHOPAEDIC SURGERY
Payer: MEDICARE

## 2025-04-18 ENCOUNTER — PROCEDURE VISIT (OUTPATIENT)
Dept: OBGYN CLINIC | Facility: MEDICAL CENTER | Age: 85
End: 2025-04-18

## 2025-04-18 VITALS — HEIGHT: 66 IN | WEIGHT: 175 LBS | BODY MASS INDEX: 28.12 KG/M2

## 2025-04-18 DIAGNOSIS — M17.12 PRIMARY OSTEOARTHRITIS OF LEFT KNEE: Primary | ICD-10-CM

## 2025-04-18 DIAGNOSIS — M19.90 OSTEOARTHRITIS, UNSPECIFIED OSTEOARTHRITIS TYPE, UNSPECIFIED SITE: Primary | ICD-10-CM

## 2025-04-18 PROCEDURE — 36415 COLL VENOUS BLD VENIPUNCTURE: CPT

## 2025-04-18 PROCEDURE — 99212 OFFICE O/P EST SF 10 MIN: CPT | Performed by: ORTHOPAEDIC SURGERY

## 2025-04-18 PROCEDURE — 20610 DRAIN/INJ JOINT/BURSA W/O US: CPT | Performed by: ORTHOPAEDIC SURGERY

## 2025-04-18 NOTE — PROGRESS NOTES
"Orthopaedic Surgery - Office Note  Mary Mckenna (85 y.o. female)   : 1940   MRN: [id number]  Encounter Date: 2025    Assessment / Plan    Left knee Primary Osteoarthritis    ACP injection was given into left knee  Continue with ice and OTC pain medication as needed.  WBAT, activity as tolerated using pain as a guide.  Follow-up:  No follow-ups on file.      Chief Complaint / Date of Onset  Left knee pain - chronic  Injury Mechanism / Date  None  Surgery / Date  None    History of Present Illness   Mary Mckenna is a 85 y.o. female who presents for f/u of left knee OA.  She states this pain is been ongoing for several years.  She has previously been treated by my colleague Dr. Reina and has been receiving Visco injections, as well as cortisone injections with minimal relief. She is here asking for information on PRP injections. She denies any trauma to the left knee. She denies any numbness or tingling.    She is here today for ACP injection.    Treatment Summary  Medications / Modalities  Current OTC anti-inflammatories  Bracing / Immobilization  None  Physical Therapy  Current with HEP  Injections  2024 - L Knee CSI  10/25/2024 - L Knee CSI  2024 - L Knee Visco  2024 - L Knee CSI  Prior Surgeries  None  Other Treatments  None    Employment / Current Status  Retired     Sport / Organization / Current Status  unknown      Review of Systems  Pertinent items are noted in HPI.  All other systems were reviewed and are negative.      Physical Exam  Ht 5' 5.56\" (1.665 m)   Wt 79.4 kg (175 lb)   BMI 28.63 kg/m²   Cons: Appears well.  No apparent distress.  Psych: Alert. Oriented x3.  Mood and affect normal.  Eyes: PERRLA, EOMI  Resp: Normal effort.  No audible wheezing or stridor.  CV: Palpable pulse.  No discernable arrhythmia.  No LE edema.  Lymph:  No palpable cervical, axillary, or inguinal lymphadenopathy.  Skin: Warm.  No palpable masses.  No visible lesions.  Neuro: Normal muscle " tone.  Normal and symmetric DTR's.     Left Knee Exam  Alignment:  Normal knee alignment.  Inspection:  No swelling. No edema. No erythema. No ecchymosis.  Palpation:  No tenderness. Mild tenderness at medial joint line.  ROM:  Knee Extension 0. Knee Flexion 110.  Strength:  Not tested.  Stability:  No objective knee instability. Stable Varus / Valgus stress, Lachman, and Posterior drawer.  Tests:  No pertinent positive or negative tests.  Patella:  Patella tracks centrally with crepitus.  Neurovascular:  Sensation intact in DP/SP/Jenkins/Sa/T nerve distributions. Sensation intact L1-S1 BLE.  2+ DP & PT pulses.  Gait:  Steady with cane.       Studies Reviewed  XR of left knee - severe tricompartmental degenerative changes of the left knee including significant joint space narrowing of the medial compartment with chondrocalcinosis present.  She also has sclerotic changes to both the tibia and the femur.  There are osteophytes along the tibial plateau and femoral condyles, as well as the patella.  No acute osseous abnormalities.      Large joint arthrocentesis: L knee  Universal Protocol:  Consent given by: patient  Supporting Documentation  Indications: pain   Procedure Details  Location: knee - L knee  Preparation: Alcohol.  Needle size: 22 G  Approach: superolateral.    Aspirate amount: 40 mL  Patient tolerance: patient tolerated the procedure well with no immediate complications  Dressing:  Sterile dressing applied    ACP injection             Medical, Surgical, Family, and Social History  The patient's medical history, family history, and social history, were reviewed and updated as appropriate.    Past Medical History:   Diagnosis Date    Acute systolic CHF (congestive heart failure) ([facility]) 12/20/2017    Last Assessment & Plan:  Euvolemic. NYHA class II. Change lopressor to toprol. On ACEI. Echo 40 days post ordered - she will be in [place] by then. We discussed if LVEF remains reduced (echo originally questioned stress  induced CM) that goal  is to optimize medical therapy with ACEI and BB. If after three months of medical therapy, he LVEF remains reduced, AICD may be recommended. I stated it is concer    Allergic     Allergic rhinitis     Last Assessed:5/21/2015    Benign essential hypertension     Last Assessed:4/24/2014    Hypertension     NSTEMI (non-ST elevated myocardial infarction) ([facility]) 12/05/2017    Last Assessment & Plan:   Denies anginal symptoms. On DAPT + high intensity statin therapy. We discussed the importance of taking Plavix and Aspirin everyday as prescribed for a minimum of one year without interruption.  Aspirin will be continued indefinitely.  Metoprolol and Lisinopril. LVEF is now normal.   Discussed s/s to seek medical attention.     I have stopped pts Zetia.       Personal history of medical treatment 06/23/2020    Tachycardia     Last Assessed:12/22/2012       Past Surgical History:   Procedure Laterality Date    CARDIAC SURGERY  2017    FOOT SURGERY      HYSTERECTOMY      OOPHORECTOMY Bilateral     age 49    THYROIDECTOMY, PARTIAL  2016    US GUIDED THYROID BIOPSY  10/26/2015       Family History   Problem Relation Age of Onset    Breast cancer Mother 52    Heart attack Father     Melanoma Daughter 18    No Known Problems Sister     No Known Problems Sister     No Known Problems Maternal Aunt     No Known Problems Maternal Grandmother     No Known Problems Maternal Grandfather     No Known Problems Paternal Grandmother     No Known Problems Paternal Grandfather     BRCA1 Negative Sister     Breast cancer Maternal Aunt         in 60's    No Known Problems Paternal Aunt        Social History     Occupational History    Not on file   Tobacco Use    Smoking status: Never    Smokeless tobacco: Never   Vaping Use    Vaping status: Never Used   Substance and Sexual Activity    Alcohol use: Not Currently     Comment: occasional-rare    Drug use: No    Sexual activity: Not Currently     Partners: Male        Allergies   Allergen Reactions    Cat Dander     Molds & Smuts     Other     Pollen Extract          Current Outpatient Medications:     acetaminophen-codeine (TYLENOL with CODEINE #3) 300-30 MG per tablet, Take 1 tablet by mouth every 8 (eight) hours as needed for moderate pain, Disp: 30 tablet, Rfl: 0    atorvastatin (LIPITOR) 20 mg tablet, Take 2 tablets (40 mg total) by mouth daily, Disp: 180 tablet, Rfl: 1    budesonide (PULMICORT) 0.5 mg/2 mL nebulizer solution, EMPTY ONE RESPULE INTO YOUR SINUS RINSE BOTTLE AND LAVAGE NOSTRILS BID, Disp: , Rfl: 3    celecoxib (CeleBREX) 200 mg capsule, Take 1 capsule (200 mg total) by mouth daily, Disp: 90 capsule, Rfl: 1    cetirizine (ZyrTEC) 10 mg tablet, TK 1 T PO NIGHTLY, Disp: , Rfl: 3    Cholecalciferol 125 MCG (5000 UT) capsule, Take 2,000 Units by mouth daily  , Disp: , Rfl:     Dupixent 300 MG/2ML SOPN, every 14 (fourteen) days, Disp: , Rfl:     fluticasone (FLONASE) 50 mcg/act nasal spray, 2 sprays into each nostril daily Shake liquid and spray, Disp: 48 g, Rfl: 3    fluticasone-vilanterol (Breo Ellipta) 200-25 mcg/actuation inhaler, Inhale 1 puff daily Rinse mouth after use., Disp: 180 blister, Rfl: 0    lisinopril (ZESTRIL) 10 mg tablet, Take 1 tablet (10 mg total) by mouth daily, Disp: 90 tablet, Rfl: 1    multivitamin (THERAGRAN) TABS, Take 1 tablet by mouth daily, Disp: , Rfl:     albuterol (PROVENTIL HFA,VENTOLIN HFA) 90 mcg/act inhaler, INHALE 2 PUFFS BY MOUTH EVERY 4 HOURS AS NEEDED FOR WHEEZING (Patient not taking: Reported on 4/18/2025), Disp: 25.5 g, Rfl: 1    ALPRAZolam (XANAX) 0.25 mg tablet, Take 1 tablet by mouth 2 (two) times a day as needed (Patient not taking: Reported on 6/21/2021), Disp: , Rfl:     aspirin (ECOTRIN LOW STRENGTH) 81 mg EC tablet, Take 1 tablet by mouth daily (Patient not taking: Reported on 12/22/2023), Disp: , Rfl:     denosumab (PROLIA) 60 mg/mL, Inject 1 mL (60 mg total) under the skin once for 1 dose, Disp: 1 mL, Rfl:  0    fluticasone-vilanterol 200-25 mcg/actuation inhaler, Inhale 1 puff daily Rinse mouth after use., Disp: 60 blister, Rfl: 5    Glucosamine-Chondroitin 500-400 MG CAPS, Take 1 tablet by mouth daily (Patient not taking: Reported on 12/22/2023), Disp: , Rfl:     ipratropium (ATROVENT) 0.02 % nebulizer solution, Inhale 1 each 4 (four) times a day as needed   (Patient not taking: Reported on 12/22/2023), Disp: , Rfl:     ondansetron (ZOFRAN) 4 mg tablet, Take 1 tablet (4 mg total) by mouth every 8 (eight) hours as needed for nausea or vomiting (Patient not taking: Reported on 6/23/2020), Disp: 20 tablet, Rfl: 0    predniSONE 10 mg tablet, Six p.o. for 1 day then 5 p.o. for 1 day then 4 p.o. for 1 day then 3 po for 1 day and 2 p.o. for 1 day then 1 p.o. (Patient not taking: Reported on 4/18/2025), Disp: 21 tablet, Rfl: 0    Current Facility-Administered Medications:     ropivacaine (NAROPIN) injection 4 mL, 4 mL, Intra-articular, Titrated, , 4 mL at 07/25/24 1525      Jeremy Humphrey MD    Scribe Attestation      I,:   am acting as a scribe while in the presence of the attending physician.:       I,:   personally performed the services described in this documentation    as scribed in my presence.:

## 2025-04-21 LAB — MISCELLANEOUS LAB TEST RESULT: NORMAL

## 2025-05-15 ENCOUNTER — TELEPHONE (OUTPATIENT)
Dept: OBGYN CLINIC | Facility: OTHER | Age: 85
End: 2025-05-15

## 2025-05-15 NOTE — TELEPHONE ENCOUNTER
Lvm with questions for next apt.   Pt just had a PRP inj in the left knee 4/18, she is sched 5/19 for another PRP inj.     I want to confirm if this just a follow up to see how she is feeling since the inj, or if she is looking for another inj

## 2025-05-19 ENCOUNTER — OFFICE VISIT (OUTPATIENT)
Dept: OBGYN CLINIC | Facility: MEDICAL CENTER | Age: 85
End: 2025-05-19
Payer: MEDICARE

## 2025-05-19 VITALS — WEIGHT: 169 LBS | BODY MASS INDEX: 28.16 KG/M2 | HEIGHT: 65 IN

## 2025-05-19 DIAGNOSIS — M17.11 PRIMARY OSTEOARTHRITIS OF RIGHT KNEE: Primary | ICD-10-CM

## 2025-05-19 DIAGNOSIS — M17.12 PRIMARY OSTEOARTHRITIS OF LEFT KNEE: ICD-10-CM

## 2025-05-19 PROCEDURE — 20610 DRAIN/INJ JOINT/BURSA W/O US: CPT | Performed by: ORTHOPAEDIC SURGERY

## 2025-05-19 PROCEDURE — 99214 OFFICE O/P EST MOD 30 MIN: CPT | Performed by: ORTHOPAEDIC SURGERY

## 2025-05-19 RX ORDER — METHYLPREDNISOLONE ACETATE 40 MG/ML
1 INJECTION, SUSPENSION INTRA-ARTICULAR; INTRALESIONAL; INTRAMUSCULAR; SOFT TISSUE
Status: COMPLETED | OUTPATIENT
Start: 2025-05-19 | End: 2025-05-19

## 2025-05-19 RX ORDER — BUPIVACAINE HYDROCHLORIDE 2.5 MG/ML
4 INJECTION, SOLUTION INFILTRATION; PERINEURAL
Status: COMPLETED | OUTPATIENT
Start: 2025-05-19 | End: 2025-05-19

## 2025-05-19 RX ADMIN — BUPIVACAINE HYDROCHLORIDE 4 ML: 2.5 INJECTION, SOLUTION INFILTRATION; PERINEURAL at 14:30

## 2025-05-19 RX ADMIN — METHYLPREDNISOLONE ACETATE 1 ML: 40 INJECTION, SUSPENSION INTRA-ARTICULAR; INTRALESIONAL; INTRAMUSCULAR; SOFT TISSUE at 14:30

## 2025-05-19 NOTE — PROGRESS NOTES
Orthopaedic Surgery - Office Note  Mary Mckenna (85 y.o. female)   : 1940   MRN: [id number]  Encounter Date: 2025    Assessment & Plan  Primary osteoarthritis of right knee    Orders:    Large joint arthrocentesis: R knee    Primary osteoarthritis of left knee             Assessment / Plan    Left knee OA  Right knee OA  CSI of right knee joint was performed  Encouraged her to keep up with her HEP as tolerated  She understands that the ultimate treatment is a TKA but she is not ready to undergo this yet  Low impact activity   Reminded her to use ice, heat and oral anti-inflammatories prn   Follow-up:  Return in about 4 weeks (around 2025) for follow up with Dr. Humphrey . For discussion about LEFT TKA      Chief Complaint / Date of Onset  Bilateral knee pain; chronic OA, pain worse L>R  Injury Mechanism / Date  None  Surgery / Date  None    History of Present Illness   Mary Mckenna is a 85 y.o. female who presents for follow up bilateral knee OA. She was last seen on 25 at which time she received a LEFT knee PRP. She feels this offered her very limited benefit. She does ambulate with cane for comfort and balance. She states that she has increase stiffness after prolonged sitting which resolved after walking for a few steps. She describes her pain as being along the joint and posterior knee. She does her HEP as tolerated. She     Treatment Summary  Medications / Modalities  Current OTC anti-inflammatories  Bracing / Immobilization  None  Physical Therapy  Completed many rounds, most recently over the winter in [place]   Current with HEP  Injections  25- RIGHT knee CSI  25- L knee PRP with mild relief    2024 - L Knee CSI  10/25/2024 - L Knee CSI  2024 - L Knee Visco  2024 - L Knee CSI  Prior Surgeries  None  Other Treatments  None     Employment / Current Status  Retired      Sport / Organization / Current Status  unknown      Review of Systems  Pertinent items are  "noted in HPI.  All other systems were reviewed and are negative.      Physical Exam  Ht 5' 5\" (1.651 m)   Wt 76.7 kg (169 lb)   BMI 28.12 kg/m²   Cons: Appears well.  No apparent distress.  Psych: Alert. Oriented x3.  Mood and affect normal.  Eyes: PERRLA, EOMI  Resp: Normal effort.  No audible wheezing or stridor.  CV: Palpable pulse.  No discernable arrhythmia.  No LE edema.  Lymph:  No palpable cervical, axillary, or inguinal lymphadenopathy.  Skin: Warm.  No palpable masses.  No visible lesions.  Neuro: Normal muscle tone.  Normal and symmetric DTR's.     Right Knee Exam  Alignment:  mild genu valgus.  Inspection:  mild  swelling. No ecchymosis.  Palpation:  mild joint line and posterior knee tenderness. No effusion.  ROM:  Knee Extension 0. Knee Flexion 125.  Strength:  Able to actively extend knee against gravity.  Stability:  No objective knee instability. Stable Varus / Valgus stress, Lachman, and Posterior drawer.  Tests:  No pertinent positive or negative tests.  Patella:  Patella tracks centrally with crepitus.  Neurovascular:  Sensation intact in DP/SP/Jenkins/Sa/T nerve distributions.  2+ DP & PT pulses.  Gait:  Steady with cane.       Studies Reviewed  I have personally reviewed pertinent films in PACS.  XR of left knee - severe tricompartmental degenerative changes of the left knee including significant joint space narrowing of the medial compartment with chondrocalcinosis present.  She also has sclerotic changes to both the tibia and the femur.  There are osteophytes along the tibial plateau and femoral condyles, as well as the patella.  No acute osseous abnormalities   XR of right knee- images from 04/07/25 showing moderate-severe lateral compartment degenerative changes     Large joint arthrocentesis: R knee    Performed by: Jeremy Humphrey MD  Authorized by: Jeremy Humphrey MD    Universal Protocol:  Consent given by: patient  Time out: Immediately prior to procedure a \"time out\" was " called to verify the correct patient, procedure, equipment, support staff and site/side marked as required.  Site marked: the operative site was marked  Supporting Documentation  Indications: pain and diagnostic evaluation     Is this a Visco injection? NoProcedure Details  Location: knee - R knee  Preparation: Patient was prepped and draped in the usual sterile fashion  Needle size: 22 G  Ultrasound guidance: no  Approach: lateral  Medications administered: 4 mL bupivacaine 0.25 %; 1 mL methylPREDNISolone acetate 40 mg/mL    Patient tolerance: patient tolerated the procedure well with no immediate complications  Dressing:  Sterile dressing applied          Medical, Surgical, Family, and Social History  The patient's medical history, family history, and social history, were reviewed and updated as appropriate.    Past Medical History:   Diagnosis Date    Acute systolic CHF (congestive heart failure) ([facility]) 12/20/2017    Last Assessment & Plan:  Euvolemic. NYHA class II. Change lopressor to toprol. On ACEI. Echo 40 days post ordered - she will be in [place] by then. We discussed if LVEF remains reduced (echo originally questioned stress induced CM) that goal  is to optimize medical therapy with ACEI and BB. If after three months of medical therapy, he LVEF remains reduced, AICD may be recommended. I stated it is concer    Allergic     Allergic rhinitis     Last Assessed:5/21/2015    Benign essential hypertension     Last Assessed:4/24/2014    Hypertension     NSTEMI (non-ST elevated myocardial infarction) ([facility]) 12/05/2017    Last Assessment & Plan:   Denies anginal symptoms. On DAPT + high intensity statin therapy. We discussed the importance of taking Plavix and Aspirin everyday as prescribed for a minimum of one year without interruption.  Aspirin will be continued indefinitely.  Metoprolol and Lisinopril. LVEF is now normal.   Discussed s/s to seek medical attention.     I have stopped pts Zetia.       Personal history  of medical treatment 06/23/2020    Tachycardia     Last Assessed:12/22/2012       Past Surgical History:   Procedure Laterality Date    CARDIAC SURGERY  2017    FOOT SURGERY      HYSTERECTOMY      OOPHORECTOMY Bilateral     age 49    THYROIDECTOMY, PARTIAL  2016    US GUIDED THYROID BIOPSY  10/26/2015       Family History   Problem Relation Age of Onset    Breast cancer Mother 52    Heart attack Father     Melanoma Daughter 18    No Known Problems Sister     No Known Problems Sister     No Known Problems Maternal Aunt     No Known Problems Maternal Grandmother     No Known Problems Maternal Grandfather     No Known Problems Paternal Grandmother     No Known Problems Paternal Grandfather     BRCA1 Negative Sister     Breast cancer Maternal Aunt         in 60's    No Known Problems Paternal Aunt        Social History     Occupational History    Not on file   Tobacco Use    Smoking status: Never    Smokeless tobacco: Never   Vaping Use    Vaping status: Never Used   Substance and Sexual Activity    Alcohol use: Not Currently     Comment: occasional-rare    Drug use: No    Sexual activity: Not Currently     Partners: Male       Allergies[1]    Current Medications[2]      Anay Everett    Scribe Attestation      I,:  Anay Everett am acting as a scribe while in the presence of the attending physician.:       I,:  Jeremy Humphrey MD personally performed the services described in this documentation    as scribed in my presence.:                [1]   Allergies  Allergen Reactions    Cat Dander     Molds & Smuts     Other     Pollen Extract    [2]   Current Outpatient Medications:     acetaminophen-codeine (TYLENOL with CODEINE #3) 300-30 MG per tablet, Take 1 tablet by mouth every 8 (eight) hours as needed for moderate pain, Disp: 30 tablet, Rfl: 0    atorvastatin (LIPITOR) 20 mg tablet, Take 2 tablets (40 mg total) by mouth daily, Disp: 180 tablet, Rfl: 1    budesonide (PULMICORT) 0.5 mg/2 mL nebulizer solution, ,  Disp: , Rfl: 3    celecoxib (CeleBREX) 200 mg capsule, Take 1 capsule (200 mg total) by mouth daily, Disp: 90 capsule, Rfl: 1    cetirizine (ZyrTEC) 10 mg tablet, , Disp: , Rfl: 3    Cholecalciferol 125 MCG (5000 UT) capsule, Take 2,000 Units by mouth in the morning., Disp: , Rfl:     Dupixent 300 MG/2ML SOPN, every 14 (fourteen) days, Disp: , Rfl:     fluticasone (FLONASE) 50 mcg/act nasal spray, 2 sprays into each nostril daily Shake liquid and spray, Disp: 48 g, Rfl: 3    fluticasone-vilanterol (Breo Ellipta) 200-25 mcg/actuation inhaler, Inhale 1 puff daily Rinse mouth after use., Disp: 180 blister, Rfl: 0    lisinopril (ZESTRIL) 10 mg tablet, Take 1 tablet (10 mg total) by mouth daily, Disp: 90 tablet, Rfl: 1    multivitamin (THERAGRAN) TABS, Take 1 tablet by mouth in the morning., Disp: , Rfl:     albuterol (PROVENTIL HFA,VENTOLIN HFA) 90 mcg/act inhaler, INHALE 2 PUFFS BY MOUTH EVERY 4 HOURS AS NEEDED FOR WHEEZING (Patient not taking: Reported on 5/19/2025), Disp: 25.5 g, Rfl: 1    ALPRAZolam (XANAX) 0.25 mg tablet, Take 1 tablet by mouth 2 (two) times a day as needed (Patient not taking: Reported on 6/21/2021), Disp: , Rfl:     aspirin (ECOTRIN LOW STRENGTH) 81 mg EC tablet, Take 1 tablet by mouth daily (Patient not taking: Reported on 12/22/2023), Disp: , Rfl:     denosumab (PROLIA) 60 mg/mL, Inject 1 mL (60 mg total) under the skin once for 1 dose, Disp: 1 mL, Rfl: 0    fluticasone-vilanterol 200-25 mcg/actuation inhaler, Inhale 1 puff daily Rinse mouth after use., Disp: 60 blister, Rfl: 5    Glucosamine-Chondroitin 500-400 MG CAPS, Take 1 tablet by mouth daily (Patient not taking: Reported on 12/22/2023), Disp: , Rfl:     ipratropium (ATROVENT) 0.02 % nebulizer solution, Inhale 1 each 4 (four) times a day as needed   (Patient not taking: Reported on 12/22/2023), Disp: , Rfl:     ondansetron (ZOFRAN) 4 mg tablet, Take 1 tablet (4 mg total) by mouth every 8 (eight) hours as needed for nausea or vomiting  (Patient not taking: Reported on 6/23/2020), Disp: 20 tablet, Rfl: 0    predniSONE 10 mg tablet, Six p.o. for 1 day then 5 p.o. for 1 day then 4 p.o. for 1 day then 3 po for 1 day and 2 p.o. for 1 day then 1 p.o. (Patient not taking: Reported on 6/28/2023), Disp: 21 tablet, Rfl: 0    Current Facility-Administered Medications:     ropivacaine (NAROPIN) injection 4 mL, 4 mL, Intra-articular, Titrated, , 4 mL at 07/25/24 1528

## 2025-06-24 ENCOUNTER — OFFICE VISIT (OUTPATIENT)
Dept: URGENT CARE | Facility: MEDICAL CENTER | Age: 85
End: 2025-06-24
Payer: MEDICARE

## 2025-06-24 VITALS
SYSTOLIC BLOOD PRESSURE: 150 MMHG | HEART RATE: 84 BPM | WEIGHT: 170 LBS | TEMPERATURE: 97.4 F | HEIGHT: 66 IN | RESPIRATION RATE: 14 BRPM | BODY MASS INDEX: 27.32 KG/M2 | DIASTOLIC BLOOD PRESSURE: 90 MMHG | OXYGEN SATURATION: 98 %

## 2025-06-24 DIAGNOSIS — R21 RASH: Primary | ICD-10-CM

## 2025-06-24 PROCEDURE — G0463 HOSPITAL OUTPT CLINIC VISIT: HCPCS

## 2025-06-24 PROCEDURE — 99213 OFFICE O/P EST LOW 20 MIN: CPT

## 2025-06-24 RX ORDER — METHYLPREDNISOLONE 4 MG/1
TABLET ORAL
Qty: 1 EACH | Refills: 0 | Status: SHIPPED | OUTPATIENT
Start: 2025-06-24

## 2025-06-24 RX ORDER — BENZOCAINE/MENTHOL 6 MG-10 MG
LOZENGE MUCOUS MEMBRANE 2 TIMES DAILY
Qty: 56 G | Refills: 0 | Status: SHIPPED | OUTPATIENT
Start: 2025-06-24

## 2025-06-25 ENCOUNTER — DOCUMENTATION (OUTPATIENT)
Dept: ADMINISTRATIVE | Facility: OTHER | Age: 85
End: 2025-06-25

## 2025-06-25 NOTE — PROGRESS NOTES
658Shane Tung Hernandez PA-C  P Patient Reported Team         Blood pressure elevated  Appointment department: [facility]  Appointment provider: Onur Hernandez PA-C  Blood pressure  06/24/25 1706 150/90  06/24/25 1644 (!) 180/99  06/25/25 4:02 PM    Patient was called after the Urgent Care visit . Home BP reading(s) was/were did not take . Patient has no  symptoms.     Thank you.  Pierre Briones MA  [facility]

## 2025-06-27 DIAGNOSIS — M81.0 OSTEOPOROSIS WITHOUT CURRENT PATHOLOGICAL FRACTURE, UNSPECIFIED OSTEOPOROSIS TYPE: ICD-10-CM

## 2025-06-30 RX ORDER — DENOSUMAB 60 MG/ML
INJECTION SUBCUTANEOUS
Qty: 1 ML | Refills: 0 | Status: SHIPPED | OUTPATIENT
Start: 2025-06-30 | End: 2025-07-01 | Stop reason: SDUPTHER

## 2025-07-01 DIAGNOSIS — M81.0 OSTEOPOROSIS WITHOUT CURRENT PATHOLOGICAL FRACTURE, UNSPECIFIED OSTEOPOROSIS TYPE: ICD-10-CM

## 2025-07-01 RX ORDER — DENOSUMAB 60 MG/ML
60 INJECTION SUBCUTANEOUS ONCE
Qty: 1 ML | Refills: 1 | Status: SHIPPED | OUTPATIENT
Start: 2025-07-01 | End: 2025-07-01

## 2025-07-03 ENCOUNTER — TELEPHONE (OUTPATIENT)
Age: 85
End: 2025-07-03

## 2025-07-03 NOTE — TELEPHONE ENCOUNTER
Left voicemail that patient can come by Monday  to get the prolia injection. Advised to call us back to schedule for nurse visit on Monday to get the injection.

## 2025-07-03 NOTE — TELEPHONE ENCOUNTER
Patient called requesting prolia vaccine(s) for the following reason(s): health    Please advise and/or schedule accordingly. Warm transferred and was told by Nan that  is out today. Please follow up with patient to schedule.    She said by Monday, both she and her  will need to schedule PROLIA injections.

## 2025-07-09 ENCOUNTER — CLINICAL SUPPORT (OUTPATIENT)
Dept: FAMILY MEDICINE CLINIC | Facility: CLINIC | Age: 85
End: 2025-07-09
Payer: MEDICARE

## 2025-07-09 DIAGNOSIS — M81.0 OSTEOPOROSIS WITHOUT CURRENT PATHOLOGICAL FRACTURE, UNSPECIFIED OSTEOPOROSIS TYPE: Primary | ICD-10-CM

## 2025-07-09 PROCEDURE — 96372 THER/PROPH/DIAG INJ SC/IM: CPT
